# Patient Record
Sex: FEMALE | Race: WHITE | NOT HISPANIC OR LATINO | Employment: OTHER | URBAN - METROPOLITAN AREA
[De-identification: names, ages, dates, MRNs, and addresses within clinical notes are randomized per-mention and may not be internally consistent; named-entity substitution may affect disease eponyms.]

---

## 2017-01-11 ENCOUNTER — COMMUNICATION - HEALTHEAST (OUTPATIENT)
Dept: INTERNAL MEDICINE | Facility: CLINIC | Age: 82
End: 2017-01-11

## 2017-01-31 ENCOUNTER — COMMUNICATION - HEALTHEAST (OUTPATIENT)
Dept: INTERNAL MEDICINE | Facility: CLINIC | Age: 82
End: 2017-01-31

## 2017-01-31 ENCOUNTER — AMBULATORY - HEALTHEAST (OUTPATIENT)
Dept: ENDOCRINOLOGY | Facility: CLINIC | Age: 82
End: 2017-01-31

## 2017-01-31 ENCOUNTER — AMBULATORY - HEALTHEAST (OUTPATIENT)
Dept: INTERNAL MEDICINE | Facility: CLINIC | Age: 82
End: 2017-01-31

## 2017-01-31 DIAGNOSIS — M81.0 SENILE OSTEOPOROSIS: ICD-10-CM

## 2017-05-09 ENCOUNTER — COMMUNICATION - HEALTHEAST (OUTPATIENT)
Dept: FAMILY MEDICINE | Facility: CLINIC | Age: 82
End: 2017-05-09

## 2017-05-10 ENCOUNTER — COMMUNICATION - HEALTHEAST (OUTPATIENT)
Dept: SCHEDULING | Facility: CLINIC | Age: 82
End: 2017-05-10

## 2017-06-19 ENCOUNTER — OFFICE VISIT - HEALTHEAST (OUTPATIENT)
Dept: FAMILY MEDICINE | Facility: CLINIC | Age: 82
End: 2017-06-19

## 2017-06-19 DIAGNOSIS — H00.012 HORDEOLUM EXTERNUM OF RIGHT LOWER EYELID: ICD-10-CM

## 2017-06-23 ENCOUNTER — COMMUNICATION - HEALTHEAST (OUTPATIENT)
Dept: FAMILY MEDICINE | Facility: CLINIC | Age: 82
End: 2017-06-23

## 2017-07-25 ENCOUNTER — COMMUNICATION - HEALTHEAST (OUTPATIENT)
Dept: FAMILY MEDICINE | Facility: CLINIC | Age: 82
End: 2017-07-25

## 2017-07-25 DIAGNOSIS — E78.5 HYPERLIPIDEMIA: ICD-10-CM

## 2017-07-25 DIAGNOSIS — I10 ESSENTIAL HYPERTENSION: ICD-10-CM

## 2017-07-27 ENCOUNTER — AMBULATORY - HEALTHEAST (OUTPATIENT)
Dept: FAMILY MEDICINE | Facility: CLINIC | Age: 82
End: 2017-07-27

## 2017-07-27 ENCOUNTER — COMMUNICATION - HEALTHEAST (OUTPATIENT)
Dept: FAMILY MEDICINE | Facility: CLINIC | Age: 82
End: 2017-07-27

## 2017-08-03 ENCOUNTER — AMBULATORY - HEALTHEAST (OUTPATIENT)
Dept: ENDOCRINOLOGY | Facility: CLINIC | Age: 82
End: 2017-08-03

## 2017-08-03 ENCOUNTER — COMMUNICATION - HEALTHEAST (OUTPATIENT)
Dept: ENDOCRINOLOGY | Facility: CLINIC | Age: 82
End: 2017-08-03

## 2017-08-03 DIAGNOSIS — M81.0 SENILE OSTEOPOROSIS: ICD-10-CM

## 2017-08-03 DIAGNOSIS — E78.2 MIXED HYPERLIPIDEMIA: ICD-10-CM

## 2017-08-21 ENCOUNTER — AMBULATORY - HEALTHEAST (OUTPATIENT)
Dept: LAB | Facility: CLINIC | Age: 82
End: 2017-08-21

## 2017-08-21 DIAGNOSIS — M81.0 SENILE OSTEOPOROSIS: ICD-10-CM

## 2017-08-21 DIAGNOSIS — E78.2 MIXED HYPERLIPIDEMIA: ICD-10-CM

## 2017-08-22 ENCOUNTER — COMMUNICATION - HEALTHEAST (OUTPATIENT)
Dept: FAMILY MEDICINE | Facility: CLINIC | Age: 82
End: 2017-08-22

## 2017-08-28 ENCOUNTER — HOSPITAL ENCOUNTER (OUTPATIENT)
Dept: MAMMOGRAPHY | Facility: HOSPITAL | Age: 82
Discharge: HOME | End: 2017-08-28
Attending: FAMILY MEDICINE

## 2017-08-28 DIAGNOSIS — Z12.31 VISIT FOR SCREENING MAMMOGRAM: ICD-10-CM

## 2017-08-29 ENCOUNTER — OFFICE VISIT - HEALTHEAST (OUTPATIENT)
Dept: ENDOCRINOLOGY | Facility: CLINIC | Age: 82
End: 2017-08-29

## 2017-08-29 ENCOUNTER — AMBULATORY - HEALTHEAST (OUTPATIENT)
Dept: ENDOCRINOLOGY | Facility: CLINIC | Age: 82
End: 2017-08-29

## 2017-08-29 DIAGNOSIS — M81.0 SENILE OSTEOPOROSIS: ICD-10-CM

## 2017-08-29 DIAGNOSIS — L25.9 CONTACT DERMATITIS: ICD-10-CM

## 2017-08-29 DIAGNOSIS — L30.9 ECZEMA: ICD-10-CM

## 2017-08-29 ASSESSMENT — MIFFLIN-ST. JEOR: SCORE: 971.59

## 2017-10-05 ENCOUNTER — COMMUNICATION - HEALTHEAST (OUTPATIENT)
Dept: FAMILY MEDICINE | Facility: CLINIC | Age: 82
End: 2017-10-05

## 2017-10-05 DIAGNOSIS — I10 ESSENTIAL HYPERTENSION: ICD-10-CM

## 2017-10-05 DIAGNOSIS — E78.5 HYPERLIPIDEMIA: ICD-10-CM

## 2017-10-27 ENCOUNTER — OFFICE VISIT - HEALTHEAST (OUTPATIENT)
Dept: FAMILY MEDICINE | Facility: CLINIC | Age: 82
End: 2017-10-27

## 2017-10-27 DIAGNOSIS — M15.9 GENERALIZED OSTEOARTHROSIS, INVOLVING MULTIPLE SITES: ICD-10-CM

## 2017-10-27 DIAGNOSIS — L30.9 ECZEMA: ICD-10-CM

## 2017-10-27 DIAGNOSIS — K21.9 GASTROESOPHAGEAL REFLUX DISEASE WITHOUT ESOPHAGITIS: ICD-10-CM

## 2017-10-27 DIAGNOSIS — E78.2 MIXED HYPERLIPIDEMIA: ICD-10-CM

## 2017-10-27 DIAGNOSIS — I10 HYPERTENSION, UNSPECIFIED TYPE: ICD-10-CM

## 2017-10-27 LAB
CHOLEST SERPL-MCNC: 178 MG/DL
FASTING STATUS PATIENT QL REPORTED: YES
HDLC SERPL-MCNC: 94 MG/DL
LDLC SERPL CALC-MCNC: 75 MG/DL
TRIGL SERPL-MCNC: 47 MG/DL

## 2017-10-30 ENCOUNTER — COMMUNICATION - HEALTHEAST (OUTPATIENT)
Dept: FAMILY MEDICINE | Facility: CLINIC | Age: 82
End: 2017-10-30

## 2017-10-31 ENCOUNTER — COMMUNICATION - HEALTHEAST (OUTPATIENT)
Dept: FAMILY MEDICINE | Facility: CLINIC | Age: 82
End: 2017-10-31

## 2017-11-11 ENCOUNTER — RECORDS - HEALTHEAST (OUTPATIENT)
Dept: ADMINISTRATIVE | Facility: OTHER | Age: 82
End: 2017-11-11

## 2017-12-11 ENCOUNTER — OFFICE VISIT - HEALTHEAST (OUTPATIENT)
Dept: CARDIOLOGY | Facility: CLINIC | Age: 82
End: 2017-12-11

## 2017-12-11 DIAGNOSIS — I47.10 SVT (SUPRAVENTRICULAR TACHYCARDIA) (H): ICD-10-CM

## 2017-12-11 ASSESSMENT — MIFFLIN-ST. JEOR: SCORE: 948.91

## 2017-12-12 ENCOUNTER — COMMUNICATION - HEALTHEAST (OUTPATIENT)
Dept: FAMILY MEDICINE | Facility: CLINIC | Age: 82
End: 2017-12-12

## 2017-12-12 DIAGNOSIS — E78.5 HYPERLIPIDEMIA: ICD-10-CM

## 2017-12-12 DIAGNOSIS — I10 ESSENTIAL HYPERTENSION: ICD-10-CM

## 2017-12-12 DIAGNOSIS — Z96.659 H/O TOTAL KNEE REPLACEMENT, UNSPECIFIED LATERALITY: ICD-10-CM

## 2017-12-13 ENCOUNTER — COMMUNICATION - HEALTHEAST (OUTPATIENT)
Dept: FAMILY MEDICINE | Facility: CLINIC | Age: 82
End: 2017-12-13

## 2018-01-19 ENCOUNTER — COMMUNICATION - HEALTHEAST (OUTPATIENT)
Dept: FAMILY MEDICINE | Facility: CLINIC | Age: 83
End: 2018-01-19

## 2018-03-02 ENCOUNTER — AMBULATORY - HEALTHEAST (OUTPATIENT)
Dept: ENDOCRINOLOGY | Facility: CLINIC | Age: 83
End: 2018-03-02

## 2018-04-02 ENCOUNTER — OFFICE VISIT - HEALTHEAST (OUTPATIENT)
Dept: FAMILY MEDICINE | Facility: CLINIC | Age: 83
End: 2018-04-02

## 2018-04-02 DIAGNOSIS — Z96.659 H/O TOTAL KNEE REPLACEMENT, UNSPECIFIED LATERALITY: ICD-10-CM

## 2018-04-02 DIAGNOSIS — T81.30XA WOUND DISRUPTION, INITIAL ENCOUNTER: ICD-10-CM

## 2018-04-02 DIAGNOSIS — Q80.9 XERODERMA: ICD-10-CM

## 2018-04-09 ENCOUNTER — OFFICE VISIT - HEALTHEAST (OUTPATIENT)
Dept: FAMILY MEDICINE | Facility: CLINIC | Age: 83
End: 2018-04-09

## 2018-04-09 DIAGNOSIS — L82.1 SEBORRHEIC KERATOSES: ICD-10-CM

## 2018-04-09 DIAGNOSIS — S80.812D: ICD-10-CM

## 2018-05-08 ENCOUNTER — COMMUNICATION - HEALTHEAST (OUTPATIENT)
Dept: FAMILY MEDICINE | Facility: CLINIC | Age: 83
End: 2018-05-08

## 2018-06-01 ENCOUNTER — AMBULATORY - HEALTHEAST (OUTPATIENT)
Dept: CARDIOLOGY | Facility: CLINIC | Age: 83
End: 2018-06-01

## 2018-06-01 ENCOUNTER — SURGERY - HEALTHEAST (OUTPATIENT)
Dept: CARDIOLOGY | Facility: CLINIC | Age: 83
End: 2018-06-01

## 2018-06-01 ENCOUNTER — COMMUNICATION - HEALTHEAST (OUTPATIENT)
Dept: CARDIOLOGY | Facility: CLINIC | Age: 83
End: 2018-06-01

## 2018-06-01 DIAGNOSIS — I47.10 SVT (SUPRAVENTRICULAR TACHYCARDIA) (H): ICD-10-CM

## 2018-06-06 ENCOUNTER — OFFICE VISIT - HEALTHEAST (OUTPATIENT)
Dept: FAMILY MEDICINE | Facility: CLINIC | Age: 83
End: 2018-06-06

## 2018-06-06 DIAGNOSIS — Z01.818 PRE-OP EXAM: ICD-10-CM

## 2018-06-06 DIAGNOSIS — R79.89 OTHER SPECIFIED ABNORMAL FINDINGS OF BLOOD CHEMISTRY: ICD-10-CM

## 2018-06-06 DIAGNOSIS — E03.9 HYPOTHYROIDISM: ICD-10-CM

## 2018-06-06 LAB
ERYTHROCYTE [DISTWIDTH] IN BLOOD BY AUTOMATED COUNT: 11.8 % (ref 11–14.5)
HCT VFR BLD AUTO: 39.8 % (ref 35–47)
HGB BLD-MCNC: 13.6 G/DL (ref 12–16)
MCH RBC QN AUTO: 32 PG (ref 27–34)
MCHC RBC AUTO-ENTMCNC: 34.2 G/DL (ref 32–36)
MCV RBC AUTO: 94 FL (ref 80–100)
PLATELET # BLD AUTO: 260 THOU/UL (ref 140–440)
PMV BLD AUTO: 7.7 FL (ref 7–10)
RBC # BLD AUTO: 4.25 MILL/UL (ref 3.8–5.4)
WBC: 8.5 THOU/UL (ref 4–11)

## 2018-06-06 ASSESSMENT — MIFFLIN-ST. JEOR: SCORE: 938.19

## 2018-06-08 ENCOUNTER — COMMUNICATION - HEALTHEAST (OUTPATIENT)
Dept: FAMILY MEDICINE | Facility: CLINIC | Age: 83
End: 2018-06-08

## 2018-06-26 ENCOUNTER — SURGERY - HEALTHEAST (OUTPATIENT)
Dept: CARDIOLOGY | Facility: CLINIC | Age: 83
End: 2018-06-26

## 2018-06-26 ENCOUNTER — COMMUNICATION - HEALTHEAST (OUTPATIENT)
Dept: CARDIOLOGY | Facility: CLINIC | Age: 83
End: 2018-06-26

## 2018-06-26 DIAGNOSIS — I47.10 SVT (SUPRAVENTRICULAR TACHYCARDIA) (H): ICD-10-CM

## 2018-06-26 ASSESSMENT — MIFFLIN-ST. JEOR: SCORE: 932.31

## 2018-06-27 ENCOUNTER — COMMUNICATION - HEALTHEAST (OUTPATIENT)
Dept: CARDIOLOGY | Facility: CLINIC | Age: 83
End: 2018-06-27

## 2018-06-27 ENCOUNTER — AMBULATORY - HEALTHEAST (OUTPATIENT)
Dept: SCHEDULING | Facility: CLINIC | Age: 83
End: 2018-06-27

## 2018-06-27 DIAGNOSIS — M81.0 SENILE OSTEOPOROSIS: ICD-10-CM

## 2018-07-03 ENCOUNTER — COMMUNICATION - HEALTHEAST (OUTPATIENT)
Dept: CARDIOLOGY | Facility: CLINIC | Age: 83
End: 2018-07-03

## 2018-07-06 ENCOUNTER — COMMUNICATION - HEALTHEAST (OUTPATIENT)
Dept: FAMILY MEDICINE | Facility: CLINIC | Age: 83
End: 2018-07-06

## 2018-07-09 ENCOUNTER — COMMUNICATION - HEALTHEAST (OUTPATIENT)
Dept: FAMILY MEDICINE | Facility: CLINIC | Age: 83
End: 2018-07-09

## 2018-07-09 ENCOUNTER — AMBULATORY - HEALTHEAST (OUTPATIENT)
Dept: LAB | Facility: CLINIC | Age: 83
End: 2018-07-09

## 2018-07-09 ENCOUNTER — COMMUNICATION - HEALTHEAST (OUTPATIENT)
Dept: ADMINISTRATIVE | Facility: CLINIC | Age: 83
End: 2018-07-09

## 2018-07-09 DIAGNOSIS — E03.9 HYPOTHYROIDISM: ICD-10-CM

## 2018-07-09 DIAGNOSIS — M81.0 SENILE OSTEOPOROSIS: ICD-10-CM

## 2018-07-10 ENCOUNTER — AMBULATORY - HEALTHEAST (OUTPATIENT)
Dept: FAMILY MEDICINE | Facility: CLINIC | Age: 83
End: 2018-07-10

## 2018-07-10 DIAGNOSIS — E03.9 HYPOTHYROIDISM: ICD-10-CM

## 2018-07-10 LAB — TSH SERPL DL<=0.005 MIU/L-ACNC: 1.79 UIU/ML (ref 0.3–5)

## 2018-07-11 ENCOUNTER — COMMUNICATION - HEALTHEAST (OUTPATIENT)
Dept: FAMILY MEDICINE | Facility: CLINIC | Age: 83
End: 2018-07-11

## 2018-07-12 ENCOUNTER — OFFICE VISIT - HEALTHEAST (OUTPATIENT)
Dept: CARDIOLOGY | Facility: CLINIC | Age: 83
End: 2018-07-12

## 2018-07-12 DIAGNOSIS — R07.89 ATYPICAL CHEST PAIN: ICD-10-CM

## 2018-07-12 DIAGNOSIS — I47.10 SVT (SUPRAVENTRICULAR TACHYCARDIA) (H): ICD-10-CM

## 2018-07-12 ASSESSMENT — MIFFLIN-ST. JEOR: SCORE: 946.64

## 2018-08-01 ENCOUNTER — HOSPITAL ENCOUNTER (OUTPATIENT)
Dept: CARDIOLOGY | Facility: HOSPITAL | Age: 83
Discharge: HOME | End: 2018-08-01
Attending: INTERNAL MEDICINE

## 2018-08-01 ENCOUNTER — HOSPITAL ENCOUNTER (OUTPATIENT)
Dept: NUCLEAR MEDICINE | Facility: HOSPITAL | Age: 83
Discharge: HOME | End: 2018-08-01
Attending: INTERNAL MEDICINE

## 2018-08-01 DIAGNOSIS — R07.89 ATYPICAL CHEST PAIN: ICD-10-CM

## 2018-08-01 LAB
CV STRESS CURRENT BP HE: NORMAL
CV STRESS CURRENT HR HE: 56
CV STRESS CURRENT HR HE: 57
CV STRESS CURRENT HR HE: 62
CV STRESS CURRENT HR HE: 64
CV STRESS CURRENT HR HE: 66
CV STRESS CURRENT HR HE: 66
CV STRESS CURRENT HR HE: 68
CV STRESS CURRENT HR HE: 68
CV STRESS CURRENT HR HE: 76
CV STRESS CURRENT HR HE: 88
CV STRESS CURRENT HR HE: 89
CV STRESS CURRENT HR HE: 89
CV STRESS CURRENT HR HE: 91
CV STRESS CURRENT HR HE: 92
CV STRESS CURRENT HR HE: 99
CV STRESS CURRENT HR HE: 99
CV STRESS DEVIATION TIME HE: NORMAL
CV STRESS ECHO PERCENT HR HE: NORMAL
CV STRESS EXERCISE STAGE HE: NORMAL
CV STRESS FINAL RESTING BP HE: NORMAL
CV STRESS FINAL RESTING HR HE: 68
CV STRESS MAX HR HE: 99
CV STRESS MAX TREADMILL GRADE HE: 0
CV STRESS MAX TREADMILL SPEED HE: 0
CV STRESS PEAK DIA BP HE: NORMAL
CV STRESS PEAK SYS BP HE: NORMAL
CV STRESS PHASE HE: NORMAL
CV STRESS PROTOCOL HE: NORMAL
CV STRESS RESTING PT POSITION HE: NORMAL
CV STRESS ST DEVIATION AMOUNT HE: NORMAL
CV STRESS ST DEVIATION ELEVATION HE: NORMAL
CV STRESS ST EVELATION AMOUNT HE: NORMAL
CV STRESS TEST TYPE HE: NORMAL
CV STRESS TOTAL STAGE TIME MIN 1 HE: NORMAL
NUC STRESS EJECTION FRACTION: 81 %
STRESS ECHO BASELINE BP: NORMAL
STRESS ECHO BASELINE HR: 61
STRESS ECHO CALCULATED PERCENT HR: 72 %
STRESS ECHO LAST STRESS BP: NORMAL
STRESS ECHO LAST STRESS HR: 89

## 2018-08-01 ASSESSMENT — MIFFLIN-ST. JEOR: SCORE: 946.64

## 2018-08-02 ENCOUNTER — COMMUNICATION - HEALTHEAST (OUTPATIENT)
Dept: CARDIOLOGY | Facility: CLINIC | Age: 83
End: 2018-08-02

## 2018-08-09 ENCOUNTER — COMMUNICATION - HEALTHEAST (OUTPATIENT)
Dept: FAMILY MEDICINE | Facility: CLINIC | Age: 83
End: 2018-08-09

## 2018-08-09 DIAGNOSIS — R79.89 OTHER SPECIFIED ABNORMAL FINDINGS OF BLOOD CHEMISTRY: ICD-10-CM

## 2018-08-14 ENCOUNTER — OFFICE VISIT - HEALTHEAST (OUTPATIENT)
Dept: FAMILY MEDICINE | Facility: CLINIC | Age: 83
End: 2018-08-14

## 2018-08-14 DIAGNOSIS — L82.0 INFLAMED SEBORRHEIC KERATOSIS: ICD-10-CM

## 2018-08-14 DIAGNOSIS — M81.0 SENILE OSTEOPOROSIS: ICD-10-CM

## 2018-08-14 LAB — CALCIUM SERPL-MCNC: 9.5 MG/DL (ref 8.5–10.5)

## 2018-08-14 ASSESSMENT — MIFFLIN-ST. JEOR: SCORE: 933.15

## 2018-08-15 LAB — 25(OH)D3 SERPL-MCNC: 55.2 NG/ML (ref 30–80)

## 2018-08-28 ENCOUNTER — COMMUNICATION - HEALTHEAST (OUTPATIENT)
Dept: FAMILY MEDICINE | Facility: CLINIC | Age: 83
End: 2018-08-28

## 2018-08-30 ENCOUNTER — OFFICE VISIT - HEALTHEAST (OUTPATIENT)
Dept: CARDIOLOGY | Facility: CLINIC | Age: 83
End: 2018-08-30

## 2018-08-30 DIAGNOSIS — I47.10 SVT (SUPRAVENTRICULAR TACHYCARDIA) (H): ICD-10-CM

## 2018-08-30 ASSESSMENT — MIFFLIN-ST. JEOR: SCORE: 932.47

## 2018-09-05 ENCOUNTER — COMMUNICATION - HEALTHEAST (OUTPATIENT)
Dept: ENDOCRINOLOGY | Facility: CLINIC | Age: 83
End: 2018-09-05

## 2018-09-11 ENCOUNTER — HOSPITAL ENCOUNTER (OUTPATIENT)
Dept: MAMMOGRAPHY | Facility: CLINIC | Age: 83
Discharge: HOME | End: 2018-09-11
Attending: FAMILY MEDICINE

## 2018-09-11 ENCOUNTER — OFFICE VISIT - HEALTHEAST (OUTPATIENT)
Dept: ENDOCRINOLOGY | Facility: CLINIC | Age: 83
End: 2018-09-11

## 2018-09-11 DIAGNOSIS — M81.0 SENILE OSTEOPOROSIS: ICD-10-CM

## 2018-09-11 DIAGNOSIS — Z12.31 VISIT FOR SCREENING MAMMOGRAM: ICD-10-CM

## 2018-09-11 DIAGNOSIS — L08.9 INFECTION OF TOE: ICD-10-CM

## 2018-09-11 ASSESSMENT — MIFFLIN-ST. JEOR: SCORE: 941.54

## 2018-09-12 ENCOUNTER — COMMUNICATION - HEALTHEAST (OUTPATIENT)
Dept: FAMILY MEDICINE | Facility: CLINIC | Age: 83
End: 2018-09-12

## 2018-09-13 ENCOUNTER — HOSPITAL ENCOUNTER (OUTPATIENT)
Dept: MAMMOGRAPHY | Facility: CLINIC | Age: 83
Discharge: HOME | End: 2018-09-13
Attending: FAMILY MEDICINE

## 2018-09-13 DIAGNOSIS — N64.89 BREAST ASYMMETRY: ICD-10-CM

## 2018-11-13 ENCOUNTER — RECORDS - HEALTHEAST (OUTPATIENT)
Dept: ADMINISTRATIVE | Facility: OTHER | Age: 83
End: 2018-11-13

## 2019-01-15 ENCOUNTER — COMMUNICATION - HEALTHEAST (OUTPATIENT)
Dept: FAMILY MEDICINE | Facility: CLINIC | Age: 84
End: 2019-01-15

## 2019-01-15 DIAGNOSIS — E78.5 HYPERLIPIDEMIA: ICD-10-CM

## 2019-01-16 ENCOUNTER — COMMUNICATION - HEALTHEAST (OUTPATIENT)
Dept: FAMILY MEDICINE | Facility: CLINIC | Age: 84
End: 2019-01-16

## 2019-01-16 DIAGNOSIS — R79.89 OTHER SPECIFIED ABNORMAL FINDINGS OF BLOOD CHEMISTRY: ICD-10-CM

## 2019-01-17 ENCOUNTER — COMMUNICATION - HEALTHEAST (OUTPATIENT)
Dept: FAMILY MEDICINE | Facility: CLINIC | Age: 84
End: 2019-01-17

## 2019-01-18 ENCOUNTER — COMMUNICATION - HEALTHEAST (OUTPATIENT)
Dept: FAMILY MEDICINE | Facility: CLINIC | Age: 84
End: 2019-01-18

## 2019-02-25 ENCOUNTER — AMBULATORY - HEALTHEAST (OUTPATIENT)
Dept: FAMILY MEDICINE | Facility: CLINIC | Age: 84
End: 2019-02-25

## 2019-02-25 ENCOUNTER — OFFICE VISIT - HEALTHEAST (OUTPATIENT)
Dept: FAMILY MEDICINE | Facility: CLINIC | Age: 84
End: 2019-02-25

## 2019-02-25 DIAGNOSIS — E03.9 HYPOTHYROIDISM, UNSPECIFIED TYPE: ICD-10-CM

## 2019-02-25 DIAGNOSIS — L57.0 ACTINIC KERATOSIS: ICD-10-CM

## 2019-02-25 DIAGNOSIS — Z00.00 HEALTH CARE MAINTENANCE: ICD-10-CM

## 2019-02-26 ENCOUNTER — COMMUNICATION - HEALTHEAST (OUTPATIENT)
Dept: FAMILY MEDICINE | Facility: CLINIC | Age: 84
End: 2019-02-26

## 2019-02-26 DIAGNOSIS — L30.9 ECZEMA: ICD-10-CM

## 2019-02-26 DIAGNOSIS — L25.9 CONTACT DERMATITIS: ICD-10-CM

## 2019-02-26 DIAGNOSIS — L08.9 INFECTION OF TOE: ICD-10-CM

## 2019-03-18 ENCOUNTER — AMBULATORY - HEALTHEAST (OUTPATIENT)
Dept: ENDOCRINOLOGY | Facility: CLINIC | Age: 84
End: 2019-03-18

## 2019-03-28 ENCOUNTER — COMMUNICATION - HEALTHEAST (OUTPATIENT)
Dept: FAMILY MEDICINE | Facility: CLINIC | Age: 84
End: 2019-03-28

## 2019-03-28 DIAGNOSIS — R79.89 OTHER SPECIFIED ABNORMAL FINDINGS OF BLOOD CHEMISTRY: ICD-10-CM

## 2019-05-09 ENCOUNTER — COMMUNICATION - HEALTHEAST (OUTPATIENT)
Dept: ADMINISTRATIVE | Facility: CLINIC | Age: 84
End: 2019-05-09

## 2019-06-07 ENCOUNTER — COMMUNICATION - HEALTHEAST (OUTPATIENT)
Dept: CARDIOLOGY | Facility: CLINIC | Age: 84
End: 2019-06-07

## 2019-06-11 ENCOUNTER — AMBULATORY - HEALTHEAST (OUTPATIENT)
Dept: ENDOCRINOLOGY | Facility: CLINIC | Age: 84
End: 2019-06-11

## 2019-06-11 DIAGNOSIS — M81.0 SENILE OSTEOPOROSIS: ICD-10-CM

## 2019-08-06 ENCOUNTER — OFFICE VISIT - HEALTHEAST (OUTPATIENT)
Dept: CARDIOLOGY | Facility: CLINIC | Age: 84
End: 2019-08-06

## 2019-08-06 DIAGNOSIS — I47.10 SVT (SUPRAVENTRICULAR TACHYCARDIA) (H): ICD-10-CM

## 2019-08-06 ASSESSMENT — MIFFLIN-ST. JEOR: SCORE: 941.54

## 2019-09-11 ENCOUNTER — AMBULATORY - HEALTHEAST (OUTPATIENT)
Dept: LAB | Facility: CLINIC | Age: 84
End: 2019-09-11

## 2019-09-11 DIAGNOSIS — E03.9 HYPOTHYROIDISM, UNSPECIFIED TYPE: ICD-10-CM

## 2019-09-11 DIAGNOSIS — M81.0 SENILE OSTEOPOROSIS: ICD-10-CM

## 2019-09-11 DIAGNOSIS — Z00.00 HEALTH CARE MAINTENANCE: ICD-10-CM

## 2019-09-11 LAB
ALBUMIN SERPL-MCNC: 3.8 G/DL (ref 3.5–5)
ALP SERPL-CCNC: 78 U/L (ref 45–120)
ALT SERPL W P-5'-P-CCNC: 13 U/L (ref 0–45)
ANION GAP SERPL CALCULATED.3IONS-SCNC: 11 MMOL/L (ref 5–18)
AST SERPL W P-5'-P-CCNC: 21 U/L (ref 0–40)
BILIRUB SERPL-MCNC: 0.6 MG/DL (ref 0–1)
BUN SERPL-MCNC: 15 MG/DL (ref 8–28)
CALCIUM SERPL-MCNC: 10.4 MG/DL (ref 8.5–10.5)
CHLORIDE BLD-SCNC: 107 MMOL/L (ref 98–107)
CHOLEST SERPL-MCNC: 192 MG/DL
CO2 SERPL-SCNC: 25 MMOL/L (ref 22–31)
CREAT SERPL-MCNC: 0.93 MG/DL (ref 0.6–1.1)
FASTING STATUS PATIENT QL REPORTED: YES
GFR SERPL CREATININE-BSD FRML MDRD: 57 ML/MIN/1.73M2
GLUCOSE BLD-MCNC: 85 MG/DL (ref 70–125)
HDLC SERPL-MCNC: 102 MG/DL
LDLC SERPL CALC-MCNC: 80 MG/DL
POTASSIUM BLD-SCNC: 4.5 MMOL/L (ref 3.5–5)
PROT SERPL-MCNC: 6.6 G/DL (ref 6–8)
SODIUM SERPL-SCNC: 143 MMOL/L (ref 136–145)
TRIGL SERPL-MCNC: 52 MG/DL
TSH SERPL DL<=0.005 MIU/L-ACNC: 1.42 UIU/ML (ref 0.3–5)

## 2019-09-12 ENCOUNTER — COMMUNICATION - HEALTHEAST (OUTPATIENT)
Dept: FAMILY MEDICINE | Facility: CLINIC | Age: 84
End: 2019-09-12

## 2019-09-12 LAB — 25(OH)D3 SERPL-MCNC: 62.1 NG/ML (ref 30–80)

## 2019-09-18 ENCOUNTER — OFFICE VISIT - HEALTHEAST (OUTPATIENT)
Dept: ENDOCRINOLOGY | Facility: CLINIC | Age: 84
End: 2019-09-18

## 2019-09-18 ENCOUNTER — AMBULATORY - HEALTHEAST (OUTPATIENT)
Dept: ENDOCRINOLOGY | Facility: CLINIC | Age: 84
End: 2019-09-18

## 2019-09-18 DIAGNOSIS — M81.0 SENILE OSTEOPOROSIS: ICD-10-CM

## 2019-10-07 ENCOUNTER — HOSPITAL ENCOUNTER (OUTPATIENT)
Dept: MAMMOGRAPHY | Facility: CLINIC | Age: 84
Discharge: HOME | End: 2019-10-07
Attending: FAMILY MEDICINE

## 2019-10-07 DIAGNOSIS — Z12.31 VISIT FOR SCREENING MAMMOGRAM: ICD-10-CM

## 2019-10-15 ENCOUNTER — OFFICE VISIT - HEALTHEAST (OUTPATIENT)
Dept: FAMILY MEDICINE | Facility: CLINIC | Age: 84
End: 2019-10-15

## 2019-10-15 DIAGNOSIS — L08.9 INFECTION OF TOE: ICD-10-CM

## 2019-10-15 DIAGNOSIS — Z71.84 TRAVEL ADVICE ENCOUNTER: ICD-10-CM

## 2019-10-15 DIAGNOSIS — I47.10 SVT (SUPRAVENTRICULAR TACHYCARDIA) (H): ICD-10-CM

## 2019-10-15 DIAGNOSIS — E78.5 HYPERLIPIDEMIA: ICD-10-CM

## 2019-10-15 DIAGNOSIS — R22.1 MASS OF LEFT SIDE OF NECK: ICD-10-CM

## 2019-10-15 DIAGNOSIS — R79.89 OTHER SPECIFIED ABNORMAL FINDINGS OF BLOOD CHEMISTRY: ICD-10-CM

## 2019-10-15 ASSESSMENT — MIFFLIN-ST. JEOR: SCORE: 952.42

## 2019-10-16 ENCOUNTER — COMMUNICATION - HEALTHEAST (OUTPATIENT)
Dept: FAMILY MEDICINE | Facility: CLINIC | Age: 84
End: 2019-10-16

## 2019-10-16 ENCOUNTER — HOSPITAL ENCOUNTER (OUTPATIENT)
Dept: ULTRASOUND IMAGING | Facility: HOSPITAL | Age: 84
Discharge: HOME | End: 2019-10-16
Attending: FAMILY MEDICINE

## 2019-10-16 DIAGNOSIS — R22.1 MASS OF LEFT SIDE OF NECK: ICD-10-CM

## 2020-02-14 ENCOUNTER — AMBULATORY - HEALTHEAST (OUTPATIENT)
Dept: ENDOCRINOLOGY | Facility: CLINIC | Age: 85
End: 2020-02-14

## 2020-02-14 DIAGNOSIS — Z92.29 PERSONAL HISTORY OF OTHER DRUG THERAPY: ICD-10-CM

## 2020-02-14 DIAGNOSIS — M81.0 SENILE OSTEOPOROSIS: ICD-10-CM

## 2020-03-09 ENCOUNTER — COMMUNICATION - HEALTHEAST (OUTPATIENT)
Dept: ADMINISTRATIVE | Facility: CLINIC | Age: 85
End: 2020-03-09

## 2020-03-23 ENCOUNTER — RECORDS - HEALTHEAST (OUTPATIENT)
Dept: ADMINISTRATIVE | Facility: OTHER | Age: 85
End: 2020-03-23

## 2020-06-09 ENCOUNTER — AMBULATORY - HEALTHEAST (OUTPATIENT)
Dept: SCHEDULING | Facility: CLINIC | Age: 85
End: 2020-06-09

## 2020-06-09 DIAGNOSIS — M81.0 SENILE OSTEOPOROSIS: ICD-10-CM

## 2020-06-23 ENCOUNTER — COMMUNICATION - HEALTHEAST (OUTPATIENT)
Dept: SCHEDULING | Facility: CLINIC | Age: 85
End: 2020-06-23

## 2020-06-23 ENCOUNTER — AMBULATORY - HEALTHEAST (OUTPATIENT)
Dept: ENDOCRINOLOGY | Facility: CLINIC | Age: 85
End: 2020-06-23

## 2020-06-29 ENCOUNTER — OFFICE VISIT - HEALTHEAST (OUTPATIENT)
Dept: FAMILY MEDICINE | Facility: CLINIC | Age: 85
End: 2020-06-29

## 2020-06-29 ENCOUNTER — COMMUNICATION - HEALTHEAST (OUTPATIENT)
Dept: FAMILY MEDICINE | Facility: CLINIC | Age: 85
End: 2020-06-29

## 2020-06-29 DIAGNOSIS — M81.0 SENILE OSTEOPOROSIS: ICD-10-CM

## 2020-06-29 DIAGNOSIS — R55 SYNCOPE: ICD-10-CM

## 2020-06-29 DIAGNOSIS — E03.9 HYPOTHYROIDISM, UNSPECIFIED TYPE: ICD-10-CM

## 2020-06-29 DIAGNOSIS — I47.10 SVT (SUPRAVENTRICULAR TACHYCARDIA) (H): ICD-10-CM

## 2020-06-29 DIAGNOSIS — Z79.899 MEDICATION MANAGEMENT: ICD-10-CM

## 2020-06-29 DIAGNOSIS — G56.21 CUBITAL TUNNEL SYNDROME ON RIGHT: ICD-10-CM

## 2020-06-29 DIAGNOSIS — R79.89 OTHER SPECIFIED ABNORMAL FINDINGS OF BLOOD CHEMISTRY: ICD-10-CM

## 2020-06-29 DIAGNOSIS — E78.5 HYPERLIPIDEMIA: ICD-10-CM

## 2020-06-29 LAB
ALBUMIN SERPL-MCNC: 3.9 G/DL (ref 3.5–5)
ALP SERPL-CCNC: 82 U/L (ref 45–120)
ALT SERPL W P-5'-P-CCNC: 13 U/L (ref 0–45)
AMPHETAMINES UR QL SCN: NORMAL
ANION GAP SERPL CALCULATED.3IONS-SCNC: 13 MMOL/L (ref 5–18)
AST SERPL W P-5'-P-CCNC: 21 U/L (ref 0–40)
ATRIAL RATE - MUSE: 57 BPM
BARBITURATES UR QL: NORMAL
BASOPHILS # BLD AUTO: 0.1 THOU/UL (ref 0–0.2)
BASOPHILS NFR BLD AUTO: 1 % (ref 0–2)
BENZODIAZ UR QL: NORMAL
BILIRUB SERPL-MCNC: 0.5 MG/DL (ref 0–1)
BUN SERPL-MCNC: 16 MG/DL (ref 8–28)
CALCIUM SERPL-MCNC: 8.9 MG/DL (ref 8.5–10.5)
CANNABINOIDS UR QL SCN: NORMAL
CHLORIDE BLD-SCNC: 107 MMOL/L (ref 98–107)
CO2 SERPL-SCNC: 20 MMOL/L (ref 22–31)
COCAINE UR QL: NORMAL
CREAT SERPL-MCNC: 0.85 MG/DL (ref 0.6–1.1)
CREAT UR-MCNC: 71.4 MG/DL
DIASTOLIC BLOOD PRESSURE - MUSE: NORMAL
EOSINOPHIL # BLD AUTO: 0.3 THOU/UL (ref 0–0.4)
EOSINOPHIL NFR BLD AUTO: 4 % (ref 0–6)
ERYTHROCYTE [DISTWIDTH] IN BLOOD BY AUTOMATED COUNT: 11.9 % (ref 11–14.5)
GFR SERPL CREATININE-BSD FRML MDRD: >60 ML/MIN/1.73M2
GLUCOSE BLD-MCNC: 82 MG/DL (ref 70–125)
HCT VFR BLD AUTO: 39 % (ref 35–47)
HGB BLD-MCNC: 13.6 G/DL (ref 12–16)
INTERPRETATION ECG - MUSE: NORMAL
LYMPHOCYTES # BLD AUTO: 2 THOU/UL (ref 0.8–4.4)
LYMPHOCYTES NFR BLD AUTO: 31 % (ref 20–40)
MCH RBC QN AUTO: 32.7 PG (ref 27–34)
MCHC RBC AUTO-ENTMCNC: 34.8 G/DL (ref 32–36)
MCV RBC AUTO: 94 FL (ref 80–100)
METHADONE UR QL SCN: NORMAL
MONOCYTES # BLD AUTO: 0.4 THOU/UL (ref 0–0.9)
MONOCYTES NFR BLD AUTO: 6 % (ref 2–10)
NEUTROPHILS # BLD AUTO: 3.9 THOU/UL (ref 2–7.7)
NEUTROPHILS NFR BLD AUTO: 59 % (ref 50–70)
OPIATES UR QL SCN: NORMAL
OXYCODONE UR QL: NORMAL
P AXIS - MUSE: 38 DEGREES
PCP UR QL SCN: NORMAL
PLATELET # BLD AUTO: 216 THOU/UL (ref 140–440)
PMV BLD AUTO: 7.9 FL (ref 7–10)
POTASSIUM BLD-SCNC: 4.5 MMOL/L (ref 3.5–5)
PR INTERVAL - MUSE: 132 MS
PROT SERPL-MCNC: 6.5 G/DL (ref 6–8)
QRS DURATION - MUSE: 74 MS
QT - MUSE: 446 MS
QTC - MUSE: 434 MS
R AXIS - MUSE: -21 DEGREES
RBC # BLD AUTO: 4.14 MILL/UL (ref 3.8–5.4)
SODIUM SERPL-SCNC: 140 MMOL/L (ref 136–145)
SYSTOLIC BLOOD PRESSURE - MUSE: NORMAL
T AXIS - MUSE: -2 DEGREES
VENTRICULAR RATE- MUSE: 57 BPM
WBC: 6.6 THOU/UL (ref 4–11)

## 2020-06-29 RX ORDER — SIMVASTATIN 20 MG
TABLET ORAL
Qty: 90 TABLET | Refills: 3 | Status: SHIPPED | OUTPATIENT
Start: 2020-06-29 | End: 2021-08-31

## 2020-06-29 RX ORDER — METOPROLOL SUCCINATE 50 MG/1
50 TABLET, EXTENDED RELEASE ORAL AT BEDTIME
Qty: 90 TABLET | Refills: 3 | Status: SHIPPED | OUTPATIENT
Start: 2020-06-29 | End: 2021-08-31

## 2020-06-30 ENCOUNTER — COMMUNICATION - HEALTHEAST (OUTPATIENT)
Dept: ADMINISTRATIVE | Facility: CLINIC | Age: 85
End: 2020-06-30

## 2020-06-30 ENCOUNTER — COMMUNICATION - HEALTHEAST (OUTPATIENT)
Dept: FAMILY MEDICINE | Facility: CLINIC | Age: 85
End: 2020-06-30

## 2020-06-30 DIAGNOSIS — M81.0 SENILE OSTEOPOROSIS: ICD-10-CM

## 2020-07-01 ENCOUNTER — COMMUNICATION - HEALTHEAST (OUTPATIENT)
Dept: FAMILY MEDICINE | Facility: CLINIC | Age: 85
End: 2020-07-01

## 2020-07-01 DIAGNOSIS — E03.9 HYPOTHYROIDISM, UNSPECIFIED TYPE: ICD-10-CM

## 2020-07-01 DIAGNOSIS — M89.9 DISORDER OF BONE, UNSPECIFIED: ICD-10-CM

## 2020-07-01 LAB
25(OH)D3 SERPL-MCNC: 59.1 NG/ML (ref 30–80)
TSH SERPL DL<=0.005 MIU/L-ACNC: 1.24 UIU/ML (ref 0.3–5)

## 2020-07-02 ENCOUNTER — COMMUNICATION - HEALTHEAST (OUTPATIENT)
Dept: FAMILY MEDICINE | Facility: CLINIC | Age: 85
End: 2020-07-02

## 2020-09-15 ENCOUNTER — COMMUNICATION - HEALTHEAST (OUTPATIENT)
Dept: CARDIOLOGY | Facility: CLINIC | Age: 85
End: 2020-09-15

## 2020-09-15 ENCOUNTER — AMBULATORY - HEALTHEAST (OUTPATIENT)
Dept: LAB | Facility: CLINIC | Age: 85
End: 2020-09-15

## 2020-09-15 DIAGNOSIS — E03.9 HYPOTHYROIDISM, UNSPECIFIED TYPE: ICD-10-CM

## 2020-09-15 DIAGNOSIS — M89.9 DISORDER OF BONE, UNSPECIFIED: ICD-10-CM

## 2020-09-16 ENCOUNTER — OFFICE VISIT - HEALTHEAST (OUTPATIENT)
Dept: CARDIOLOGY | Facility: CLINIC | Age: 85
End: 2020-09-16

## 2020-09-16 ENCOUNTER — COMMUNICATION - HEALTHEAST (OUTPATIENT)
Dept: ENDOCRINOLOGY | Facility: CLINIC | Age: 85
End: 2020-09-16

## 2020-09-16 DIAGNOSIS — I47.10 SVT (SUPRAVENTRICULAR TACHYCARDIA) (H): ICD-10-CM

## 2020-09-16 ASSESSMENT — MIFFLIN-ST. JEOR: SCORE: 923.39

## 2020-09-22 ENCOUNTER — OFFICE VISIT - HEALTHEAST (OUTPATIENT)
Dept: ENDOCRINOLOGY | Facility: CLINIC | Age: 85
End: 2020-09-22

## 2020-09-22 DIAGNOSIS — M81.0 SENILE OSTEOPOROSIS: ICD-10-CM

## 2020-10-08 ENCOUNTER — HOSPITAL ENCOUNTER (OUTPATIENT)
Dept: MAMMOGRAPHY | Facility: CLINIC | Age: 85
Discharge: HOME | End: 2020-10-08
Attending: FAMILY MEDICINE

## 2020-10-08 ENCOUNTER — COMMUNICATION - HEALTHEAST (OUTPATIENT)
Dept: FAMILY MEDICINE | Facility: CLINIC | Age: 85
End: 2020-10-08

## 2020-10-08 DIAGNOSIS — Z12.31 VISIT FOR SCREENING MAMMOGRAM: ICD-10-CM

## 2020-11-07 ENCOUNTER — COMMUNICATION - HEALTHEAST (OUTPATIENT)
Dept: FAMILY MEDICINE | Facility: CLINIC | Age: 85
End: 2020-11-07

## 2020-11-07 DIAGNOSIS — R79.89 OTHER SPECIFIED ABNORMAL FINDINGS OF BLOOD CHEMISTRY: ICD-10-CM

## 2020-11-09 ENCOUNTER — COMMUNICATION - HEALTHEAST (OUTPATIENT)
Dept: FAMILY MEDICINE | Facility: CLINIC | Age: 85
End: 2020-11-09

## 2020-11-09 DIAGNOSIS — R79.89 OTHER SPECIFIED ABNORMAL FINDINGS OF BLOOD CHEMISTRY: ICD-10-CM

## 2020-12-31 ENCOUNTER — AMBULATORY - HEALTHEAST (OUTPATIENT)
Dept: ENDOCRINOLOGY | Facility: CLINIC | Age: 85
End: 2020-12-31

## 2021-01-21 ENCOUNTER — OFFICE VISIT - HEALTHEAST (OUTPATIENT)
Dept: FAMILY MEDICINE | Facility: CLINIC | Age: 86
End: 2021-01-21

## 2021-01-21 DIAGNOSIS — L65.9 HAIR THINNING: ICD-10-CM

## 2021-01-21 DIAGNOSIS — L57.0 ACTINIC KERATOSIS: ICD-10-CM

## 2021-01-21 DIAGNOSIS — R09.81 NOSE CONGESTED: ICD-10-CM

## 2021-01-21 DIAGNOSIS — S41.111A SKIN TEAR OF RIGHT UPPER ARM WITHOUT COMPLICATION, INITIAL ENCOUNTER: ICD-10-CM
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
## 2021-01-21 ASSESSMENT — MIFFLIN-ST. JEOR: SCORE: 925.21

## 2021-02-09 ENCOUNTER — SURGERY - HEALTHEAST (OUTPATIENT)
Dept: SURGERY | Facility: HOSPITAL | Age: 86
End: 2021-02-09

## 2021-02-09 ENCOUNTER — ANESTHESIA - HEALTHEAST (OUTPATIENT)
Dept: SURGERY | Facility: HOSPITAL | Age: 86
End: 2021-02-09

## 2021-02-09 ASSESSMENT — MIFFLIN-ST. JEOR: SCORE: 905.82

## 2021-02-10 ENCOUNTER — COMMUNICATION - HEALTHEAST (OUTPATIENT)
Dept: SCHEDULING | Facility: CLINIC | Age: 86
End: 2021-02-10

## 2021-02-10 ASSESSMENT — MIFFLIN-ST. JEOR
SCORE: 933.94
SCORE: 933.5

## 2021-02-11 ASSESSMENT — MIFFLIN-ST. JEOR: SCORE: 953.44

## 2021-02-12 ASSESSMENT — MIFFLIN-ST. JEOR
SCORE: 960.25
SCORE: 1004.7

## 2021-02-13 ASSESSMENT — MIFFLIN-ST. JEOR: SCORE: 966.49

## 2021-02-15 ENCOUNTER — COMMUNICATION - HEALTHEAST (OUTPATIENT)
Dept: SURGERY | Facility: CLINIC | Age: 86
End: 2021-02-15

## 2021-02-18 ENCOUNTER — COMMUNICATION - HEALTHEAST (OUTPATIENT)
Dept: INTERNAL MEDICINE | Facility: CLINIC | Age: 86
End: 2021-02-18

## 2021-02-22 ENCOUNTER — OFFICE VISIT - HEALTHEAST (OUTPATIENT)
Dept: SURGERY | Facility: CLINIC | Age: 86
End: 2021-02-22

## 2021-02-22 ENCOUNTER — COMMUNICATION - HEALTHEAST (OUTPATIENT)
Dept: SURGERY | Facility: CLINIC | Age: 86
End: 2021-02-22

## 2021-02-22 DIAGNOSIS — K26.5 PERFORATED DUODENAL ULCER (H): ICD-10-CM

## 2021-02-23 ENCOUNTER — AMBULATORY - HEALTHEAST (OUTPATIENT)
Dept: SURGERY | Facility: CLINIC | Age: 86
End: 2021-02-23

## 2021-02-26 ENCOUNTER — OFFICE VISIT - HEALTHEAST (OUTPATIENT)
Dept: FAMILY MEDICINE | Facility: CLINIC | Age: 86
End: 2021-02-26

## 2021-02-26 DIAGNOSIS — Z09 HOSPITAL DISCHARGE FOLLOW-UP: ICD-10-CM

## 2021-02-26 DIAGNOSIS — K26.5 PERFORATED DUODENAL ULCER (H): ICD-10-CM

## 2021-02-26 LAB
BASOPHILS # BLD AUTO: 0.1 THOU/UL (ref 0–0.2)
BASOPHILS NFR BLD AUTO: 2 % (ref 0–2)
EOSINOPHIL # BLD AUTO: 0.6 THOU/UL (ref 0–0.4)
EOSINOPHIL NFR BLD AUTO: 11 % (ref 0–6)
ERYTHROCYTE [DISTWIDTH] IN BLOOD BY AUTOMATED COUNT: 14.3 % (ref 11–14.5)
HCT VFR BLD AUTO: 35.7 % (ref 35–47)
HGB BLD-MCNC: 11.7 G/DL (ref 12–16)
IMM GRANULOCYTES # BLD: 0 THOU/UL
IMM GRANULOCYTES NFR BLD: 0 %
LYMPHOCYTES # BLD AUTO: 1 THOU/UL (ref 0.8–4.4)
LYMPHOCYTES NFR BLD AUTO: 17 % (ref 20–40)
MCH RBC QN AUTO: 31 PG (ref 27–34)
MCHC RBC AUTO-ENTMCNC: 32.8 G/DL (ref 32–36)
MCV RBC AUTO: 95 FL (ref 80–100)
MONOCYTES # BLD AUTO: 0.7 THOU/UL (ref 0–0.9)
MONOCYTES NFR BLD AUTO: 13 % (ref 2–10)
NEUTROPHILS # BLD AUTO: 3.2 THOU/UL (ref 2–7.7)
NEUTROPHILS NFR BLD AUTO: 57 % (ref 50–70)
PLATELET # BLD AUTO: 355 THOU/UL (ref 140–440)
PMV BLD AUTO: 8.9 FL (ref 7–10)
RBC # BLD AUTO: 3.77 MILL/UL (ref 3.8–5.4)
WBC: 5.6 THOU/UL (ref 4–11)

## 2021-02-26 ASSESSMENT — ANXIETY QUESTIONNAIRES
2. NOT BEING ABLE TO STOP OR CONTROL WORRYING: NOT AT ALL
IF YOU CHECKED OFF ANY PROBLEMS ON THIS QUESTIONNAIRE, HOW DIFFICULT HAVE THESE PROBLEMS MADE IT FOR YOU TO DO YOUR WORK, TAKE CARE OF THINGS AT HOME, OR GET ALONG WITH OTHER PEOPLE: SOMEWHAT DIFFICULT
6. BECOMING EASILY ANNOYED OR IRRITABLE: SEVERAL DAYS
7. FEELING AFRAID AS IF SOMETHING AWFUL MIGHT HAPPEN: NOT AT ALL
4. TROUBLE RELAXING: SEVERAL DAYS
3. WORRYING TOO MUCH ABOUT DIFFERENT THINGS: NOT AT ALL
5. BEING SO RESTLESS THAT IT IS HARD TO SIT STILL: NOT AT ALL
GAD7 TOTAL SCORE: 3
1. FEELING NERVOUS, ANXIOUS, OR ON EDGE: SEVERAL DAYS

## 2021-02-26 ASSESSMENT — PATIENT HEALTH QUESTIONNAIRE - PHQ9: SUM OF ALL RESPONSES TO PHQ QUESTIONS 1-9: 3

## 2021-02-26 ASSESSMENT — MIFFLIN-ST. JEOR: SCORE: 913.07

## 2021-03-01 ENCOUNTER — RECORDS - HEALTHEAST (OUTPATIENT)
Dept: ADMINISTRATIVE | Facility: OTHER | Age: 86
End: 2021-03-01

## 2021-03-01 ENCOUNTER — COMMUNICATION - HEALTHEAST (OUTPATIENT)
Dept: SURGERY | Facility: CLINIC | Age: 86
End: 2021-03-01

## 2021-03-01 ENCOUNTER — COMMUNICATION - HEALTHEAST (OUTPATIENT)
Dept: FAMILY MEDICINE | Facility: CLINIC | Age: 86
End: 2021-03-01

## 2021-03-01 DIAGNOSIS — K26.5 PERFORATED DUODENAL ULCER (H): ICD-10-CM

## 2021-03-03 ENCOUNTER — RECORDS - HEALTHEAST (OUTPATIENT)
Dept: FAMILY MEDICINE | Facility: CLINIC | Age: 86
End: 2021-03-03

## 2021-03-03 ENCOUNTER — COMMUNICATION - HEALTHEAST (OUTPATIENT)
Dept: SURGERY | Facility: CLINIC | Age: 86
End: 2021-03-03

## 2021-03-03 DIAGNOSIS — Z12.31 VISIT FOR SCREENING MAMMOGRAM: ICD-10-CM

## 2021-03-11 ENCOUNTER — COMMUNICATION - HEALTHEAST (OUTPATIENT)
Dept: CARDIOLOGY | Facility: CLINIC | Age: 86
End: 2021-03-11

## 2021-04-08 ENCOUNTER — OFFICE VISIT - HEALTHEAST (OUTPATIENT)
Dept: FAMILY MEDICINE | Facility: CLINIC | Age: 86
End: 2021-04-08

## 2021-04-08 DIAGNOSIS — Z01.818 PREOP GENERAL PHYSICAL EXAM: ICD-10-CM

## 2021-04-08 DIAGNOSIS — R06.00 DYSPNEA, UNSPECIFIED TYPE: ICD-10-CM

## 2021-04-08 DIAGNOSIS — E78.5 HYPERLIPIDEMIA, UNSPECIFIED HYPERLIPIDEMIA TYPE: ICD-10-CM

## 2021-04-08 DIAGNOSIS — L57.0 ACTINIC KERATOSIS: ICD-10-CM

## 2021-04-08 DIAGNOSIS — E03.9 HYPOTHYROIDISM, UNSPECIFIED TYPE: ICD-10-CM

## 2021-04-08 DIAGNOSIS — R79.89 ELEVATED BRAIN NATRIURETIC PEPTIDE (BNP) LEVEL: ICD-10-CM

## 2021-04-08 LAB
ALBUMIN SERPL-MCNC: 3.8 G/DL (ref 3.5–5)
ALP SERPL-CCNC: 83 U/L (ref 45–120)
ALT SERPL W P-5'-P-CCNC: 10 U/L (ref 0–45)
ANION GAP SERPL CALCULATED.3IONS-SCNC: 12 MMOL/L (ref 5–18)
AST SERPL W P-5'-P-CCNC: 17 U/L (ref 0–40)
BASOPHILS # BLD AUTO: 0.1 THOU/UL (ref 0–0.2)
BASOPHILS NFR BLD AUTO: 2 % (ref 0–2)
BILIRUB SERPL-MCNC: 0.6 MG/DL (ref 0–1)
BNP SERPL-MCNC: 204 PG/ML (ref 0–167)
BUN SERPL-MCNC: 16 MG/DL (ref 8–28)
CALCIUM SERPL-MCNC: 9.1 MG/DL (ref 8.5–10.5)
CHLORIDE BLD-SCNC: 108 MMOL/L (ref 98–107)
CHOLEST SERPL-MCNC: 203 MG/DL
CO2 SERPL-SCNC: 23 MMOL/L (ref 22–31)
CREAT SERPL-MCNC: 0.86 MG/DL (ref 0.6–1.1)
EOSINOPHIL # BLD AUTO: 0.4 THOU/UL (ref 0–0.4)
EOSINOPHIL NFR BLD AUTO: 7 % (ref 0–6)
ERYTHROCYTE [DISTWIDTH] IN BLOOD BY AUTOMATED COUNT: 13.3 % (ref 11–14.5)
FASTING STATUS PATIENT QL REPORTED: YES
GFR SERPL CREATININE-BSD FRML MDRD: >60 ML/MIN/1.73M2
GLUCOSE BLD-MCNC: 90 MG/DL (ref 70–125)
HCT VFR BLD AUTO: 39.6 % (ref 35–47)
HDLC SERPL-MCNC: 86 MG/DL
HGB BLD-MCNC: 12.9 G/DL (ref 12–16)
IMM GRANULOCYTES # BLD: 0 THOU/UL
IMM GRANULOCYTES NFR BLD: 0 %
LDLC SERPL CALC-MCNC: 99 MG/DL
LYMPHOCYTES # BLD AUTO: 1.4 THOU/UL (ref 0.8–4.4)
LYMPHOCYTES NFR BLD AUTO: 26 % (ref 20–40)
MCH RBC QN AUTO: 30.7 PG (ref 27–34)
MCHC RBC AUTO-ENTMCNC: 32.6 G/DL (ref 32–36)
MCV RBC AUTO: 94 FL (ref 80–100)
MONOCYTES # BLD AUTO: 0.3 THOU/UL (ref 0–0.9)
MONOCYTES NFR BLD AUTO: 6 % (ref 2–10)
NEUTROPHILS # BLD AUTO: 3.2 THOU/UL (ref 2–7.7)
NEUTROPHILS NFR BLD AUTO: 59 % (ref 50–70)
PLATELET # BLD AUTO: 270 THOU/UL (ref 140–440)
PMV BLD AUTO: 9.9 FL (ref 7–10)
POTASSIUM BLD-SCNC: 4.4 MMOL/L (ref 3.5–5)
PROT SERPL-MCNC: 6.4 G/DL (ref 6–8)
RBC # BLD AUTO: 4.2 MILL/UL (ref 3.8–5.4)
SODIUM SERPL-SCNC: 143 MMOL/L (ref 136–145)
TRIGL SERPL-MCNC: 89 MG/DL
TSH SERPL DL<=0.005 MIU/L-ACNC: 1.64 UIU/ML (ref 0.3–5)
WBC: 5.4 THOU/UL (ref 4–11)

## 2021-04-08 ASSESSMENT — MIFFLIN-ST. JEOR: SCORE: 913.98

## 2021-04-09 ENCOUNTER — COMMUNICATION - HEALTHEAST (OUTPATIENT)
Dept: FAMILY MEDICINE | Facility: CLINIC | Age: 86
End: 2021-04-09

## 2021-04-09 ENCOUNTER — COMMUNICATION - HEALTHEAST (OUTPATIENT)
Dept: CARDIOLOGY | Facility: CLINIC | Age: 86
End: 2021-04-09

## 2021-04-09 LAB
SARS-COV-2 PCR COMMENT: NORMAL
SARS-COV-2 RNA SPEC QL NAA+PROBE: NEGATIVE
SARS-COV-2 VIRUS SPECIMEN SOURCE: NORMAL

## 2021-04-10 ENCOUNTER — COMMUNICATION - HEALTHEAST (OUTPATIENT)
Dept: SCHEDULING | Facility: CLINIC | Age: 86
End: 2021-04-10

## 2021-05-07 ENCOUNTER — COMMUNICATION - HEALTHEAST (OUTPATIENT)
Dept: FAMILY MEDICINE | Facility: CLINIC | Age: 86
End: 2021-05-07

## 2021-05-07 DIAGNOSIS — R79.89 OTHER SPECIFIED ABNORMAL FINDINGS OF BLOOD CHEMISTRY: ICD-10-CM

## 2021-05-07 RX ORDER — LEVOTHYROXINE SODIUM 25 UG/1
TABLET ORAL
Qty: 90 TABLET | Refills: 2 | Status: SHIPPED | OUTPATIENT
Start: 2021-05-07 | End: 2021-08-31

## 2021-05-19 ENCOUNTER — OFFICE VISIT - HEALTHEAST (OUTPATIENT)
Dept: CARDIOLOGY | Facility: CLINIC | Age: 86
End: 2021-05-19

## 2021-05-19 DIAGNOSIS — I47.10 SVT (SUPRAVENTRICULAR TACHYCARDIA) (H): ICD-10-CM

## 2021-05-19 DIAGNOSIS — I34.0 MITRAL VALVE INSUFFICIENCY, UNSPECIFIED ETIOLOGY: ICD-10-CM

## 2021-05-19 ASSESSMENT — MIFFLIN-ST. JEOR: SCORE: 937.57

## 2021-05-25 ENCOUNTER — RECORDS - HEALTHEAST (OUTPATIENT)
Dept: ADMINISTRATIVE | Facility: CLINIC | Age: 86
End: 2021-05-25

## 2021-05-26 ENCOUNTER — RECORDS - HEALTHEAST (OUTPATIENT)
Dept: ADMINISTRATIVE | Facility: CLINIC | Age: 86
End: 2021-05-26

## 2021-05-27 ENCOUNTER — RECORDS - HEALTHEAST (OUTPATIENT)
Dept: ADMINISTRATIVE | Facility: CLINIC | Age: 86
End: 2021-05-27

## 2021-05-27 VITALS
WEIGHT: 127 LBS | DIASTOLIC BLOOD PRESSURE: 60 MMHG | HEIGHT: 60 IN | RESPIRATION RATE: 16 BRPM | BODY MASS INDEX: 24.94 KG/M2 | SYSTOLIC BLOOD PRESSURE: 108 MMHG | HEART RATE: 64 BPM

## 2021-05-27 VITALS — DIASTOLIC BLOOD PRESSURE: 68 MMHG | SYSTOLIC BLOOD PRESSURE: 112 MMHG

## 2021-05-27 ASSESSMENT — PATIENT HEALTH QUESTIONNAIRE - PHQ9: SUM OF ALL RESPONSES TO PHQ QUESTIONS 1-9: 3

## 2021-05-27 NOTE — TELEPHONE ENCOUNTER
Refill Approved    Rx renewed per Medication Renewal Policy. Medication was last renewed on 1/16/2019 with 2 refills.  Last office visit: 2/25/2019 with Dr DHEERAJ Trent.     Meredith Wise, [facility] Triage/Med Refill 3/28/2019     Requested Prescriptions   Pending Prescriptions Disp Refills     levothyroxine (SYNTHROID, LEVOTHROID) 25 MCG tablet 90 tablet 2     Sig: Take 1 tablet (25 mcg total) by mouth Daily at 6:00 am.    Thyroid Hormones Protocol Passed - 3/28/2019  4:15 PM       Passed - Provider visit in past 12 months or next 3 months    Last office visit with prescriber/PCP: 2/25/2019 Eduardo Trent MD OR same dept: 2/25/2019 Eduardo Trent MD OR same specialty: 2/25/2019 Eduardo Trent MD  Last physical: Visit date not found Last MTM visit: Visit date not found   Next visit within 3 mo: Visit date not found  Next physical within 3 mo: Visit date not found  Prescriber OR PCP: Eduardo Trent MD  Last diagnosis associated with med order: 1. Abnormal Blood Chemistry  - levothyroxine (SYNTHROID, LEVOTHROID) 25 MCG tablet; Take 1 tablet (25 mcg total) by mouth Daily at 6:00 am.  Dispense: 90 tablet; Refill: 2    If protocol passes may refill for 12 months if within 3 months of last provider visit (or a total of 15 months).            Passed - TSH on file in past 12 months for patient age 12 & older    TSH   Date Value Ref Range Status   07/09/2018 1.79 0.30 - 5.00 uIU/mL Final

## 2021-05-27 NOTE — TELEPHONE ENCOUNTER
"Refill Request  Did you contact pharmacy: Yes  Medication name:   Requested Prescriptions     Pending Prescriptions Disp Refills     levothyroxine (SYNTHROID, LEVOTHROID) 25 MCG tablet 90 tablet 2     Sig: Take 1 tablet (25 mcg total) by mouth Daily at 6:00 am.     Who prescribed the medication: Dr Trent        Pharmacy Name and Location: Changing pharmacy to [facility].  Patient states would like 90 day supply with \"many\" refills.        Okay to leave a detailed message: yes  "

## 2021-05-28 ENCOUNTER — RECORDS - HEALTHEAST (OUTPATIENT)
Dept: ADMINISTRATIVE | Facility: CLINIC | Age: 86
End: 2021-05-28

## 2021-05-28 ASSESSMENT — ANXIETY QUESTIONNAIRES: GAD7 TOTAL SCORE: 3

## 2021-05-29 NOTE — TELEPHONE ENCOUNTER
Patient wanting to schedule follow up on a Monday in [place]. The August schedule is not out yet and patient wishes to schedule.    Informed patient I do not schedule and cannot manipulate the schedule. Dr. Contreras is not in  clinic on any Monday in August.   Encouraged her to flex her schedule so that she can schedule follow up in August. She is agreeable to be schedule a Monday appointment at [facility] location in August. Informed patient Dr. Contreras will be in the [facility] clinic the week on August 8-12.   Informed patient I will send a message to the  to please call patient for 8/12/19 appointment a [facility] when the schedule becomes available.    Message sent to  with request to schedule patient @  August 8-12.

## 2021-05-29 NOTE — TELEPHONE ENCOUNTER
----- Message from Radha Jameson sent at 6/7/2019  8:15 AM CDT -----  Contact: Pt  Caller: Maria D    Primary cardiologist: Dr. Contreras    Detailed reason for call: Has health concerns, not short of breath, no chest pain. Please call back.     Best phone number: [phone]    Best time to contact: Today    Ok to leave a detailed message? Yes    Device? No

## 2021-05-31 VITALS — BODY MASS INDEX: 24.73 KG/M2 | WEIGHT: 131 LBS | HEIGHT: 61 IN

## 2021-05-31 VITALS — WEIGHT: 130 LBS | BODY MASS INDEX: 24.56 KG/M2

## 2021-05-31 VITALS — WEIGHT: 126 LBS | HEIGHT: 61 IN | BODY MASS INDEX: 23.79 KG/M2

## 2021-05-31 VITALS — BODY MASS INDEX: 24.36 KG/M2 | WEIGHT: 128.9 LBS

## 2021-06-01 VITALS — BODY MASS INDEX: 23.62 KG/M2 | WEIGHT: 125 LBS

## 2021-06-01 VITALS — WEIGHT: 127 LBS | HEIGHT: 60 IN | BODY MASS INDEX: 24.94 KG/M2

## 2021-06-01 VITALS — WEIGHT: 125.7 LBS | BODY MASS INDEX: 24.68 KG/M2 | HEIGHT: 60 IN

## 2021-06-01 VITALS — BODY MASS INDEX: 24.58 KG/M2 | HEIGHT: 60 IN | WEIGHT: 125.2 LBS

## 2021-06-01 VITALS — WEIGHT: 125 LBS | BODY MASS INDEX: 23.62 KG/M2

## 2021-06-01 VITALS — BODY MASS INDEX: 25.32 KG/M2 | HEIGHT: 60 IN | WEIGHT: 129 LBS

## 2021-06-01 VITALS — WEIGHT: 129 LBS | BODY MASS INDEX: 25.32 KG/M2 | HEIGHT: 60 IN

## 2021-06-01 NOTE — TELEPHONE ENCOUNTER
Test Results  Who is calling?:  The patient  Who ordered the test:  Dr. Trent  Type of test: Lab  Date of test:  8/6/2019  Where was the test performed:  [facility]  What are your questions/concerns?:  The patient would like results phoned to her. The patient would like this expedited and the results  no later than 9/12/2019 at 1:00.   Okay to leave a detailed message?:  Yes

## 2021-06-01 NOTE — TELEPHONE ENCOUNTER
Patient Returning Call  Reason for call:  Calling on status of fax request.  Information relayed to patient:  Told patient that not completed as of this call. The patient was looking for the reuslt, which she says she was told, will be leaving office for fax # at [phone].  The patient would be fine is clinic would mail her a copy of the results to her home address.  Does not need the fax sent if the information is put in the mail  Patient has additional questions:  No  If YES, what are your questions/concerns:  NA  Okay to leave a detailed message?: No call back needed

## 2021-06-01 NOTE — TELEPHONE ENCOUNTER
Left message to call back for: Results  Information to relay to patient:  Phone line was busy, unable to leave vm. Please relay message below from .

## 2021-06-01 NOTE — PROGRESS NOTES
[facility]  ENDOCRINOLOGY    Osteoporosis Follow Up 9/20/2019    Maria D Moore, 11/24/1934, [id number]          Reason for visit      1. Osteoporosis Senile        History     Maria D Moore is a very pleasant 84 y.o. old female who presents for follow up.   SUMMARY:  As you know, she took a fall when she became syncopal with atrial fibrillation on August 13th, 2010. Initially plain radiographs were negative but with persistent pain. A subsequent CT scan on September 26th, 2010, did show the sacral insufficiency fractures both sacral wings extending into the S2 sacral body and nondisplaced fracture of the right pubic bone. MRI in September 2010 confirmed these findings. A followup CT done on March 9 2011, indicates again a broad based bilateral sacral insufficiency fractures and ununited fracture of the right pubic bone. She is here for further recommendations. I should note that at the time she was hospitalized with  her fractures, she was told that her vitamin D level was low and she was treated with 12746 International Units of vitamin D weekly for three months. Subsequently, her level chuck just above 50 ng/mL has had serious  pelvic fractures while on bisphosphonates for at least six years. A 78-year-old woman with severe osteoporosis who has completed 2 years of teriparatide.  We discussed options for following with antiresorptive agent and I do think that denosumab would be preferable for her and she was in agreement with this plan.       TODAY:  Maria D returns today in f/u for Osteoporosis. She is full of tales of her latest trip (to [place]) and that her next one will be to [place]. She has been getting Prolia injections for about 5 years now, and is due for one today. She has had no difficulties with the injections.  Her last Dexa Scan showed positive response. Her current Vit D level is 62.1 and Calcium level is 10.4. She is taking both in supplementation.         Risk Factors     The following high-  risk conditions have been ruled out: celiac disease, eating disorders, gastric bypass, hyperparathyroidism, inflammatory bowel disease, hyperthyroidism, rheumatoid arthritis, lupus, chronic kidney disease.     Maria D Moore has the following risk factors: Age, Female gender,  and Low BMI     She is not on high risk medications such as glucocorticoids, anti-coagulants, anti-convulsants, chemotherapy or levothyroxine.    Patient deniesHysterectomy, Oophrectomy, Breast cancer and Family history of breast cancer.      Past Medical History     Patient Active Problem List   Diagnosis     Fatigue     Temperature Intolerance To Cold   (Consistent)     Abnormal Blood Chemistry     Osteoarthritis Of The Ankle/Foot (Multiple Joints)     Mixed hyperlipidemia     Furuncle     Generalized Osteoarthritis Of Multiple Sites     Edema     Osteoporosis Senile     Vaginal Discharge (Symptom)     Back Pain     Automatic Atrial Tachycardia     Lightheadedness     SVT (supraventricular tachycardia) (H)     Hypotension, unspecified hypotension type       Family History       family history includes Stroke in her father.    Social History      reports that she has never smoked. She has never used smokeless tobacco. She reports that she does not drink alcohol or use drugs.      Review of Systems     Patient denies current pain, limited mobility, fractures.   Remainder per HPI.      Vital Signs     /64 (Patient Site: Right Arm, Patient Position: Sitting, Cuff Size: Adult Regular)   Pulse 80     Physical Exam     GENERAL:  Normal, NIRD  EYES:  Pupils equal, round and reactive to light; no proptosis, lid lag or  periorbital edema.  THYROID:  Thyroid is normal.  No tenderness or bruit  NECK: No lymph nodes  MUSCULOSKELETAL: No joint abnormalities, FROM in all four extremities. No kyphosis. Muscle strength grossly normal without evidence of wasting.  HEART:  Regular rate and rhythm without murmur.  LUNGS:  Clear to  auscultation.  ABDOMEN:Soft, non-tender, no masses or organomegaly  NEURO:  Patella Reflexes were normal.No tremors  SKIN:  No acanthosis nigricans or vitiligo        Assessment     1. Osteoporosis Senile        Plan     Maria D will get her next Prolia today. She will get her next Dexa Scan in 2020.  She will see me next year after her trip.        Total visit minutes:25  Time spent counseling and coordination of care:23    Deisy Mckeon   Endocrinology  9/20/2019  5:42 AM        Current Medications     Outpatient Medications Prior to Visit   Medication Sig Dispense Refill     aspirin 325 MG tablet Take 325 mg by mouth daily.       calcium carbonate (OS-DOMONIQUE) 600 mg (1,500 mg) tablet Take 600 mg by mouth daily.       cholecalciferol, vitamin D3, 5,000 unit Tab Take by mouth. Take 1 tablet daily.       ciprofloxacin HCl (CIPRO) 500 MG tablet 500mg once 1 hr prior to procedure 4 tablet 0     hydrocortisone 0.5 % cream Apply to affected area twice a day. (Patient taking differently: Apply topically 2 (two) times a day as needed. Apply to affected area twice a day.) 30 g 0     levothyroxine (SYNTHROID, LEVOTHROID) 25 MCG tablet Take 1 tablet (25 mcg total) by mouth Daily at 6:00 am. 90 tablet 3     metoprolol succinate (TOPROL-XL) 50 MG 24 hr tablet Take 1 tablet (50 mg total) by mouth at bedtime. 90 tablet 3     mupirocin (BACTROBAN) 2 % ointment Apply topically 3 (three) times a day as needed. 30 g 0     niacin 500 MG tablet Take 500 mg by mouth daily with breakfast.       OMEGA-3/DHA/EPA/FISH OIL (FISH OIL-OMEGA-3 FATTY ACIDS) 300-1,000 mg capsule Take 2,000 mg by mouth daily.        simvastatin (ZOCOR) 20 MG tablet TAKE 1 TABLET DAILY AT BEDTIME 90 tablet 2     sodium fluoride-pot nitrate (PREVIDENT 5000 ENAMEL PROTECT) 1.1-5 % Pste Apply to teeth.       traMADol (ULTRAM) 50 mg tablet Take 1 tablet (50 mg total) by mouth every 8 (eight) hours as needed for pain. 69 tablet 0     triamcinolone (KENALOG) 0.1 % cream  Apply topically 2 (two) times a day. Apply ointment to affected areas twice daily until rash is gone. 45 g 0     VIT C/E/ZN/COPPR/LUTEIN/ZEAXAN (PRESERVISION AREDS 2 ORAL) Take 1 tablet by mouth 2 (two) times a day.       Facility-Administered Medications Prior to Visit   Medication Dose Route Frequency Provider Last Rate Last Dose     denosumab 60 mg (PROLIA 60 mg/ml)  60 mg Subcutaneous Q6 Months Deisy Mckeon, NP   60 mg at 09/18/19 0832         Lab Results     TSH   Date Value Ref Range Status   09/11/2019 1.42 0.30 - 5.00 uIU/mL Final     PTH   Date Value Ref Range Status   07/26/2016 34 10 - 86 pg/mL Final     Calcium   Date Value Ref Range Status   09/11/2019 10.4 8.5 - 10.5 mg/dL Final     Phosphorus   Date Value Ref Range Status   07/26/2016 3.8 2.5 - 4.5 mg/dL Final           Imaging Results   Last DEXA scan:  Results for orders placed in visit on 06/27/18   DXA Bone Density Scan    Narrative 8/31/2018      RE: Maria D Moore  YOB: 1934        Dear Deisy Mckeon,    Patient Profile:  83 y.o. female, postmenopausal, is here for the follow up bone density   test.   History of fractures - Yes; Foot and tailbone area. Family history of   osteoporosis - None.  Family history of hip fracture: None. Smoking   history - No. Osteoporosis treatment past -  Yes;  Bisphosphonates and   Forteo. Osteoporosis treatment current - Yes;  Prolia.  Chronic medical   problems - Hysterectomy and Oophorectomy. High risk medications -    Thyroid;  Yes, Currently (Just started).      Assessment:    1. The spine bone density L1-L2 with T-score -0.8.  Of note, sclerotic   changes are noted in the lower lumbar vertebrae, which may artifactually   elevate the bone mineral density.  2. Femoral bone densities show left femoral neck T- score -1.6 and right   total hip T-score -1.4.  3. Trabecular bone score indicates good trabecular bone architecture.  4.  Left one third radius T score -3.4.    83 y.o. female with  OSTEOPOROSIS and HIGH fracture risk.     Of note, the previous bone densitometry is from 2016 and 2014 were done at   a different facility.  Therefore, those results are not directly   comparable.  However, a previous bone density dated  July 20, 2010 was   done on this scanner. There has been a   7.3 % increase in the bone   density of the spine.  Additionally there has been a 5.0% increase in the   left total hip and no statistically significant  change in the right total   hip.    Recommendations:  Continuation of treatment for osteoporosis is recommended with follow up   bone density scan in 2 years.      Bone densitometry was performed on your patient using our [employer]   densitometer. The results are summarized and a copy of the actual scans   are included for your review. In conformity with the International Society   of Clinical Densitometry's most recent position statement for DXA   interpretation (2015), the diagnosis will be made on the lowest measured   T-score of the lumbar spine, femoral neck, total proximal femur or 33%   radius. Note the change in terminology for diagnostic classification from   OSTEOPENIA to LOW BONE MASS. All trending for sequential exams will be   done using multiple vertebrae or the total proximal femur. Fracture risk   is based on the WHO Fracture Risk Assessment Tool (FRAX). If additional   information is needed or if you would like to discuss the results, please   do not hesitate to call me.       Thank you for referring this patient to [facility].   We are happy to be of service in support of you and your practice. If you   have any questions or suggestions to improve our service, please call me   at [phone].     Sincerely,     Shaista Caruso M.D. C.C.CONSUELO.  Osteoporosis Services, [facility]           Prolia Injection Phone Screen      Screening questions have been asked 2-3 days prior to administration visit for Prolia. If any questions  "are answered with \"Yes,\" this phone encounter were will routed to ordering provider for further evaluation.     1.  When was the last injection?  3/18/19    2.  Has insurance for this injection been verified?  Yes    3.  Did you experience any new onset achiness or rashes that lasted for over a month with your previous Prolia injection?   No    4.  Do you have a fever over 101?F or a new deep cough that is unusual for you today? No    5.  Have you started any new medications in the last 6 months that you were told could affect your immune system? These may have been prescribed by oncologist, transplant, rheumatology, or dermatology.   No    6.  In the last 6 months have you have gastric bypass or parathyroid surgery?   No    7.  Do you plan dental work requiring drilling into the bone such as implants/extractions or oral surgery in the next 2-3 months?   No    8. Do you have new insurance since the last injection?    Patient informed if symptoms discussed above present prior to their administration appointment, they are to notify clinic immediately.     Minda Kim            The following steps were completed to comply with the REMS program for Prolia:  1. Ordering provider has previously reviewed information in the Medication Guide and Patient Counseling Chart, including the serious risks of Prolia  and the symptoms of each risk and have been advised to seek prompt medical attention if they have signs or symptoms of any of the serious risks.  2. Provided each patient a copy of the Medication Guide and Patient Brochure.  See MAR for administration details.   Indication: Prolia  (denosumab) is a prescription medicine used to treat osteoporosis in patients who:   Are at high risk for fracture, meaning patients who have had a fracture related to osteoporosis, or who have multiple risk factors for fracture; Cannot use another osteoporosis medicine or other osteoporosis medicines did not work well.   The timeline " for early/late injections would be 4 weeks early and any time after the 6 month nomi. If a patient receives their injection late, then the subsequent injection would be 6 months from the date that they actually received the injection    Have the screening questions been asked prior to this administration? Yes      After obtaining consent, and per orders of Deisy Mckeon NP, injection of Prolia 60 mg given by Minda Kim. Patient instructed to remain in clinic for 20 minutes afterwards, and to report any adverse reaction to me immediately.

## 2021-06-01 NOTE — TELEPHONE ENCOUNTER
----- Message from Eduardo Trent MD sent at 9/12/2019  9:04 AM CDT -----  Please inform patient that not all results are back yet.  Thyroid level is normal.  Kidney function and liver function are normal.  No signs of diabetes.  Cholesterol levels are at goal range.

## 2021-06-01 NOTE — TELEPHONE ENCOUNTER
Who is calling:  Patient  Reason for Call:  Patient is asking about the status of her results. Relayed they were sent out 9/12/19 1:54 pm.  Date of last appointment with primary care: 8/6/19  Okay to leave a detailed message: Yes

## 2021-06-01 NOTE — TELEPHONE ENCOUNTER
Who is calling:  Patient   Reason for Call:  Patient called is requesting her lab results be faxed to her at [phone].    Writer attempted to relay message from Eduardo Trent MD regarding lab results, but caller did not allow this and again asked for the lab results to be faxed to her.  Date of last appointment with primary care:  2/5/2019  Okay to leave a detailed message: No

## 2021-06-02 ENCOUNTER — RECORDS - HEALTHEAST (OUTPATIENT)
Dept: ADMINISTRATIVE | Facility: CLINIC | Age: 86
End: 2021-06-02

## 2021-06-02 VITALS — WEIGHT: 127 LBS | BODY MASS INDEX: 24.94 KG/M2 | HEIGHT: 60 IN

## 2021-06-02 VITALS — HEIGHT: 60 IN | BODY MASS INDEX: 24.54 KG/M2 | WEIGHT: 125 LBS

## 2021-06-02 NOTE — TELEPHONE ENCOUNTER
Called Radiology to see if they could read the US results ASAP.   Radiology stated they will get to the results as soon as they can..  Patient is expecting to hear back from clinic by the end of the day.

## 2021-06-02 NOTE — PROGRESS NOTES
ASSESSMENT & PLAN:  1.  Status post joint replacement  States she needs prophylactic antibiotics prior to dental work due to joint replacement.  She requests refill today.  Penicillin allergy.  - ciprofloxacin HCl (CIPRO) 500 MG tablet; 500mg once 1 hr prior to procedure  Dispense: 4 tablet; Refill: 0    2.  Hypothyroidism  TSH from 9/11/2019 reviewed and within normal limits.  Refill provided.  - levothyroxine (SYNTHROID, LEVOTHROID) 25 MCG tablet; Take 1 tablet (25 mcg total) by mouth Daily at 6:00 am.  Dispense: 90 tablet; Refill: 3    3. SVT (supraventricular tachycardia) (H)  - metoprolol succinate (TOPROL-XL) 50 MG 24 hr tablet; Take 1 tablet (50 mg total) by mouth at bedtime.  Dispense: 90 tablet; Refill: 3    4. Hyperlipidemia  Recent fasting lipid panel and CMP reviewed  - simvastatin (ZOCOR) 20 MG tablet; TAKE 1 TABLET DAILY AT BEDTIME  Dispense: 90 tablet; Refill: 3    5. Mass of left side of neck  Recommend we further evaluate this with ultrasound.  It is mobile, suspect benign, but needs further evaluation to determine.  - US Neck Limited; Future    6. Travel advice encounter  Discussed CDC travel recommendations for [place].  She was given a printed copy from CDC website.  She declines typhoid vaccine and she will be low risk given her travel plans.  She has had hepatitis A vaccines.  We discussed hepatitis B vaccines but she declines.  Based on CDC travel recommendations she does not need other vaccines, does not need malaria prophylaxis.  Discussed antibiotic to be used as needed for traveler's diarrhea and she would like a printed prescription for this.  Discussed with patient when to use over-the-counter diarrhea medication versus Cipro versus when to be seen.  - ciprofloxacin HCl (CIPRO) 500 MG tablet; One tablet twice daily for up to 3 days for traveler's diarrhea  Dispense: 6 tablet; Refill: 0    7.  Pruritus, back  Skin is dry but no other findings on her back.  Recommend adequate moisturizing and  follow-up if needed    8.  Possible recent anal leakage   She denies any blood.  She should follow-up if this happens again and it could be further evaluated.      There are no Patient Instructions on file for this visit.    Orders Placed This Encounter   Procedures     US Neck Limited     Standing Status:   Future     Standing Expiration Date:   10/15/2020     Order Specific Question:   Reason for Exam (Describe Symptoms):     Answer:   palpable firm lymph node left anterior cervical     Order Specific Question:   Can the procedure be changed per Radiologist protocol?     Answer:   Yes     Medications Discontinued During This Encounter   Medication Reason     ciprofloxacin HCl (CIPRO) 500 MG tablet Reorder     levothyroxine (SYNTHROID, LEVOTHROID) 25 MCG tablet Reorder     metoprolol succinate (TOPROL-XL) 50 MG 24 hr tablet Reorder     simvastatin (ZOCOR) 20 MG tablet Reorder       No follow-ups on file.    CHIEF COMPLAINT:  Chief Complaint   Patient presents with     Medication Request     requesting refills     Rash     Back rash, itchy       HISTORY OF PRESENT ILLNESS:  Maria D is a 84 y.o. female presenting to the clinic today for med check, rash, travel consult    Hypothyroidism: She is on a small dose of levothyroxine.  She is had a TSH checked, within normal limits.  Needs refill for the year.    Hypertension: Needs refill of her metoprolol.  No concerns.    Hyperlipidemia: She needs a refill of her statin.  No side effects or problems.  Recent fasting lipids on 9/11/2019 were excellent.  Normal CMP at that time as well.    Travel consult: Patient travels to [place] in May for a couple of weeks.  Traveling with a tourist group.  Very careful about what she eats and drinks.  She will be only in tourist areas such as [facility]. has traveled extensively in the past.  Had traveler's diarrhea in [place] and wants to be prepared if this happens in [place].    Itching: She has had some itching on her back recently.  " Wonders if there is a rash or if there are any moles that need to be treated.  States she has had skin lesions frozen off of her back in the past.  She cannot see the area.    REVIEW OF SYSTEMS:   Follows with endocrinology for Prolia for osteoporosis and follows with cardiology for history of SVT status post ablation.  Reports a couple of weeks ago she saw some brown staining in her underwear, states she is certain it was not dried blood.  History of AUBREE/BSO.  All other systems are negative.    PFSH:  Patient Active Problem List   Diagnosis     Fatigue     Temperature Intolerance To Cold   (Consistent)     Abnormal Blood Chemistry     Osteoarthritis Of The Ankle/Foot (Multiple Joints)     Mixed hyperlipidemia     Furuncle     Generalized Osteoarthritis Of Multiple Sites     Edema     Osteoporosis Senile     Vaginal Discharge (Symptom)     Back Pain     Automatic Atrial Tachycardia     Lightheadedness     SVT (supraventricular tachycardia) (H)     Hypotension, unspecified hypotension type        TOBACCO USE:  Social History     Tobacco Use   Smoking Status Never Smoker   Smokeless Tobacco Never Used       VITALS:  Vitals:    10/15/19 1445   BP: 113/58   Patient Site: Left Arm   Patient Position: Sitting   Cuff Size: Adult Regular   Pulse: 63   Temp: (!) 95.7  F (35.4  C)   TempSrc: Oral   SpO2: 98%   Weight: 129 lb 6.4 oz (58.7 kg)   Height: 5' 0.25\" (1.53 m)     Wt Readings from Last 3 Encounters:   10/15/19 129 lb 6.4 oz (58.7 kg)   08/06/19 127 lb (57.6 kg)   09/11/18 127 lb (57.6 kg)     Body mass index is 25.06 kg/m .    PHYSICAL EXAM:  GENERAL: Pleasant, well-appearing patient in no acute distress.   HEENT: Pupils equal round reactive to light. Sclerae and conjunctivae clear. Oropharynx is clear with moist mucous membranes.   NECK: Supple.  Palpable elongated lymph node left anterior cervical chain, no other lymphadenopathy appreciated.  Is nontender without any overlying skin changes  CARDIOVASCULAR: Heart " regular rate and rhythm without murmur normal S1-S2   LUNGS: Clear to auscultation bilaterally, good air movement throughout   EXTREMITIES Warm and well-perfused without edema. Pedal pulses palpable and symmetric bilaterally   NEURO: Alert and oriented. Grossly nonfocal.   PSYCHIATRIC: Presents on time and well groomed. Normal speech and thought content. Full affect. No abnormal movements or behaviors noted      MEDICATIONS:  Current Outpatient Medications   Medication Sig Dispense Refill     aspirin 325 MG tablet Take 325 mg by mouth daily.       calcium carbonate (OS-DOMONIQUE) 600 mg (1,500 mg) tablet Take 600 mg by mouth every other day.              cholecalciferol, vitamin D3, 5,000 unit Tab Take by mouth. Take 1 tablet daily.       ciprofloxacin HCl (CIPRO) 500 MG tablet 500mg once 1 hr prior to procedure 4 tablet 0     hydrocortisone 0.5 % cream Apply to affected area twice a day. (Patient taking differently: Apply topically 2 (two) times a day as needed. Apply to affected area twice a day.) 30 g 0     levothyroxine (SYNTHROID, LEVOTHROID) 25 MCG tablet Take 1 tablet (25 mcg total) by mouth Daily at 6:00 am. 90 tablet 3     metoprolol succinate (TOPROL-XL) 50 MG 24 hr tablet Take 1 tablet (50 mg total) by mouth at bedtime. 90 tablet 3     mupirocin (BACTROBAN) 2 % ointment Apply topically 3 (three) times a day as needed. 30 g 0     niacin 500 MG tablet Take 500 mg by mouth daily with breakfast.       OMEGA-3/DHA/EPA/FISH OIL (FISH OIL-OMEGA-3 FATTY ACIDS) 300-1,000 mg capsule Take 2,000 mg by mouth daily.        simvastatin (ZOCOR) 20 MG tablet TAKE 1 TABLET DAILY AT BEDTIME 90 tablet 3     sodium fluoride-pot nitrate (PREVIDENT 5000 ENAMEL PROTECT) 1.1-5 % Pste Apply to teeth.       traMADol (ULTRAM) 50 mg tablet Take 1 tablet (50 mg total) by mouth every 8 (eight) hours as needed for pain. 69 tablet 0     triamcinolone (KENALOG) 0.1 % cream Apply topically 2 (two) times a day. Apply ointment to affected areas  twice daily until rash is gone. 45 g 0     VIT C/E/ZN/COPPR/LUTEIN/ZEAXAN (PRESERVISION AREDS 2 ORAL) Take 1 tablet by mouth 2 (two) times a day.       ciprofloxacin HCl (CIPRO) 500 MG tablet One tablet twice daily for up to 3 days for traveler's diarrhea 6 tablet 0     Current Facility-Administered Medications   Medication Dose Route Frequency Provider Last Rate Last Dose     denosumab 60 mg (PROLIA 60 mg/ml)  60 mg Subcutaneous Q6 Months Deisy Mckeon NP   60 mg at 09/18/19 3297

## 2021-06-02 NOTE — TELEPHONE ENCOUNTER
Patient walked into clinic requesting ultrasound results.  notified her that results are not final yet ultrasound was just completed at noon today, the radiologist has not reviewed them. She is very anxious about results and would like ASAP.

## 2021-06-02 NOTE — TELEPHONE ENCOUNTER
----- Message from Eduardo Trent MD sent at 10/16/2019  4:02 PM CDT -----  Please inform patient that U/S shows normal appearing lymphnode with no other concerns.  No follow up is needed at this time.

## 2021-06-02 NOTE — TELEPHONE ENCOUNTER
Patient stopped in clinic to get results. Relayed message below from . Patient states understanding and is relieved.

## 2021-06-03 VITALS — SYSTOLIC BLOOD PRESSURE: 104 MMHG | HEART RATE: 80 BPM | DIASTOLIC BLOOD PRESSURE: 64 MMHG

## 2021-06-03 VITALS
HEIGHT: 60 IN | DIASTOLIC BLOOD PRESSURE: 58 MMHG | OXYGEN SATURATION: 98 % | BODY MASS INDEX: 25.4 KG/M2 | TEMPERATURE: 95.7 F | HEART RATE: 63 BPM | SYSTOLIC BLOOD PRESSURE: 113 MMHG | WEIGHT: 129.4 LBS

## 2021-06-03 VITALS — WEIGHT: 127 LBS | BODY MASS INDEX: 24.94 KG/M2 | HEIGHT: 60 IN

## 2021-06-04 VITALS
SYSTOLIC BLOOD PRESSURE: 120 MMHG | RESPIRATION RATE: 16 BRPM | BODY MASS INDEX: 23.98 KG/M2 | DIASTOLIC BLOOD PRESSURE: 73 MMHG | OXYGEN SATURATION: 96 % | WEIGHT: 123.8 LBS | HEART RATE: 62 BPM

## 2021-06-05 VITALS
SYSTOLIC BLOOD PRESSURE: 104 MMHG | HEART RATE: 64 BPM | DIASTOLIC BLOOD PRESSURE: 76 MMHG | BODY MASS INDEX: 24.15 KG/M2 | WEIGHT: 123 LBS | HEIGHT: 60 IN | RESPIRATION RATE: 12 BRPM

## 2021-06-05 VITALS
BODY MASS INDEX: 24.23 KG/M2 | WEIGHT: 123.4 LBS | SYSTOLIC BLOOD PRESSURE: 124 MMHG | HEIGHT: 60 IN | HEART RATE: 76 BPM | DIASTOLIC BLOOD PRESSURE: 76 MMHG

## 2021-06-05 VITALS
WEIGHT: 121.6 LBS | SYSTOLIC BLOOD PRESSURE: 126 MMHG | DIASTOLIC BLOOD PRESSURE: 72 MMHG | HEART RATE: 75 BPM | HEIGHT: 60 IN | BODY MASS INDEX: 23.87 KG/M2 | RESPIRATION RATE: 18 BRPM

## 2021-06-05 VITALS — HEIGHT: 60 IN | BODY MASS INDEX: 26.19 KG/M2 | WEIGHT: 133.38 LBS

## 2021-06-05 VITALS
HEIGHT: 60 IN | SYSTOLIC BLOOD PRESSURE: 118 MMHG | HEART RATE: 66 BPM | WEIGHT: 121.8 LBS | OXYGEN SATURATION: 99 % | DIASTOLIC BLOOD PRESSURE: 74 MMHG | BODY MASS INDEX: 23.91 KG/M2

## 2021-06-06 NOTE — TELEPHONE ENCOUNTER
Patient just had a tooth extracted today and is scheduled for her prolia injection on 03.24.2020.   Should she still keep this appointment?     Maria D @ [phone]

## 2021-06-06 NOTE — TELEPHONE ENCOUNTER
Patient had a tooth pulled today and wants to know if she can still get the Prolia injection on 4/8.

## 2021-06-06 NOTE — TELEPHONE ENCOUNTER
I called the pt and recommend she wait to have the injection for 21 mo to ensure she is healing from the extraction. Pt understands and is rescheduled.

## 2021-06-09 NOTE — TELEPHONE ENCOUNTER
Patient Returning Call  Reason for call:  Patient returning call   Information relayed to patient:  Message below  Patient has additional questions:  No  If YES, what are your questions/concerns:  n/a  Okay to leave a detailed message?: No call back needed

## 2021-06-09 NOTE — PROGRESS NOTES
Assessment:     1. Syncope  Electrocardiogram Perform and Read    HM1(CBC and Differential)    Comprehensive Metabolic Panel    HM1 (CBC with Diff)   2. Hyperlipidemia  simvastatin (ZOCOR) 20 MG tablet   3. SVT (supraventricular tachycardia) (H)  metoprolol succinate (TOPROL-XL) 50 MG 24 hr tablet   4. Abnormal Blood Chemistry  levothyroxine (SYNTHROID, LEVOTHROID) 25 MCG tablet   5. Osteoporosis Senile  traMADoL (ULTRAM) 50 mg tablet   6. Medication management  Drug Abuse 1+, Urine   7. Cubital tunnel syndrome on right     8. Hypothyroidism, unspecified type       Please see plan of care as below.      Greater than 40 minutes was spent today in interview and examination  with more than 50% of that time in counseling and coordination of care especially regarding SVTs and need for possible further work-up, also for tramadol being on the beers criteria.  Discussion on likely cubital tunnel syndrome, however if symptoms persist then further evaluation is needed.       Plan:      The diagnosis was discussed with the patient and evaluation and treatment plans outlined.  See orders for lab and imaging studies.  Further follow-up plans will be based on outcome of lab/imaging studies; see orders.  Follow up: Return in about 6 months (around 12/29/2020) for Annual physical.     I have seen her today for scab on your nose that has healed.  Continue to monitor it.  If it reappears, do let me know and I will send you to dermatology.    For possible presyncope/syncope which you described as this flash, we will do an EKG.  As it has happened only one time, you can monitor but if it reoccurs then you need to see the cardiologist again.  Do let me know and we will try to arrange an appointment.    I have noted that you had low hemoglobin in 2018, we will recheck it today.    Tramadol is being refilled, do use it judiciously as it falls under the beers criteria that means medication that are high risk for regularly.  It does  "increase her risk of fall.  We will need to do a drug urine screen and a controlled substance protocol.    The numbness and tingling of right arm, looks like ulnar neuropathy.  Here is something to read about it.  If symptoms persist then we will need further evaluation.      Subjective:      Maria D Moore is a  female who presents for evaluation of   Chief Complaint   Patient presents with     Medication Management     Medication check and refill request     Nose Problem     Would like to evaluate scaly nose- seems better, unsure if it was sunburn     Skin Problem     Would like to have back checked for keratosis spots     Arm Problem     States occasionally gets\"tingling\" in RT arm- lasting about 5 minutes then goes away       Patient is here for multiple medical issues as above.  She does need a refill for tramadol that she is using sparingly.  A prescription of 69 pills would last her for about a year.  She uses it when after a long walk her low back/hip start hurting.  She uses it very judiciously.  Regarding her nose problem she had a scaliness on the tip of her nose that has now resolved.  She is unsure if it is of a sunburn.  She would like me to take a look at her skin lesion.  In the past she has had keratosis in the back that was removed and she wants to check out if it has happened again.  Regarding her arm problem, she gets tingling in her right that last for about 5 minutes then goes away.  She has notices that she is in her car sleeping.  This is intermittent.  Most of the numbness and tingling is on the right hand and on the radial side.  She has had syncope/presyncope in the past and has had ablation done.  She follows with cardiology annually.  About 3 weeks ago she has this episode which she describes as a flash.  She did not pass out or fall down.  She has not had another episode since then.  She denies any changes in vision.  No nausea vomiting or diarrhea.  She describes her energy levels " is normal.      The following portions of the patient's history were reviewed and updated as appropriate: allergies, current medications, past family history, past medical history, past social history, past surgical history and problem list.    Review of Systems  A 12 point comprehensive review of systems was negative except as noted.       Objective:   /73 (Patient Site: Left Arm, Patient Position: Sitting, Cuff Size: Adult Regular)   Pulse 62   Resp 16   Wt 123 lb 12.8 oz (56.2 kg)   SpO2 96%   BMI 23.98 kg/m      General Appearance: healthy, alert, oriented and no distress  Neck:  supple, no adenopathy, thyroid normal  Heart: Normal heart sounds, S1 and S2, No murmurs.  No carotid bruit.  Lungs: Normal breath sounds, Clear to auscultation bilateral  Skin exam: The lesion on the nose is not present anymore.  Examination of the back shows no suberic keratosis at this point.  Scars from previous removals are noted.  MSK: Examination of the right extremity is normal.  Sensation is intact.  Strength is normal.  Neurological exam: gait normal, alert and oriented X 3  Hem/Lymph/Immuno exam: negative findings:  no cervical nodes, no supraclavicular nodes,

## 2021-06-09 NOTE — TELEPHONE ENCOUNTER
Would recommend if patient is having skin changes to not wait for 3 months to be seen -but rather sooner, possibly virtually with one of the providers. And if she passed out as well a couple of weeks ago she should not wait for 3 months.

## 2021-06-09 NOTE — TELEPHONE ENCOUNTER
Who is calling:  Maria D  Reason for Call:  Patient called stated she prefers to be seeing in person on Sept 15th 1st AM appointment for: a scaly nose x1 month and passing out from a few weeks ago and medication check  Date of last appointment with primary care: 1 year  Okay to leave a detailed message: Yes

## 2021-06-09 NOTE — TELEPHONE ENCOUNTER
----- Message from Flex Bain MD sent at 6/29/2020 10:32 AM CDT -----  Please inform patient that her hemoglobin and white count tests are normal.  Other tests are pending and I will inform her when results are available.  Flex Bain MD  6/29/2020

## 2021-06-09 NOTE — TELEPHONE ENCOUNTER
Spoke to pt and let her know that we are able to add on requested labs and will call her with results when they come back

## 2021-06-09 NOTE — TELEPHONE ENCOUNTER
Yes, I will take her as a PCP.  I have already seen her, there is no need for  establish care appointment    Flex Bain MD  7/2/2020

## 2021-06-09 NOTE — TELEPHONE ENCOUNTER
Labs have been ordered for the patient.    However, there was checked in September 2019 and were normal.    Please inform lab and inquire if they can  be run as add-on.    Flex Bain MD  7/1/2020

## 2021-06-09 NOTE — TELEPHONE ENCOUNTER
Patient Returning Call  Reason for call:  I am calling for my lab results.  Information relayed to patient:    Please inform patient vitamin D and TSH are normal.  Please mail a letter.   Flex Bain MD  7/2/2020   Patient was given this information as instructed.  Patient has additional questions:  Yes  If YES, what are your questions/concerns:  I really loved Dr. Bain and I would like to make her my primary doctor. Can she be my PCP?  Okay to leave a detailed message?: Yes

## 2021-06-09 NOTE — TELEPHONE ENCOUNTER
Orders being requested: Add on labs of TSH and Vitamin D to the lab draw from 6/29/2020.  Reason service is needed/diagnosis: Screening thyroid level and Vitamin D level.  When are orders needed by: asap per patient.  Where to send Orders: [facility]  Okay to leave detailed message?  Yes  Patient is requesting a call back when this is ordered.

## 2021-06-09 NOTE — TELEPHONE ENCOUNTER
----- Message from Flex Bain MD sent at 6/29/2020  4:45 PM CDT -----  Please inform patient that EKG and the rest of the lab appear to be fairly normal  .  Please mail a copy of the results.    Flex Bain MD  6/29/2020

## 2021-06-09 NOTE — PROGRESS NOTES

## 2021-06-09 NOTE — TELEPHONE ENCOUNTER
Called and spoke to patient, relayed Dr. Trent's message to patient. Pt states understanding. Pt OK to schedule, she wanted to schedule with Dr. ENCARNACION. I scheduled patient with Dr. ENCARNACION for Monday 06/29 at 9 am.

## 2021-06-11 ENCOUNTER — COMMUNICATION - HEALTHEAST (OUTPATIENT)
Dept: ADMINISTRATIVE | Facility: CLINIC | Age: 86
End: 2021-06-11

## 2021-06-11 DIAGNOSIS — M81.0 SENILE OSTEOPOROSIS: ICD-10-CM

## 2021-06-11 NOTE — PROGRESS NOTES
"Maria D Moore is a 85 y.o. female who is being evaluated via a billable telephone visit.      The patient has been notified of following:     \"This telephone visit will be conducted via a call between you and your physician/provider. We have found that certain health care needs can be provided without the need for a physical exam.  This service lets us provide the care you need with a short phone conversation.  If a prescription is necessary we can send it directly to your pharmacy.  If lab work is needed we can place an order for that and you can then stop by our lab to have the test done at a later time.    Telephone visits are billed at different rates depending on your insurance coverage. During this emergency period, for some insurers they may be billed the same as an in-person visit.  Please reach out to your insurance provider with any questions.    If during the course of the call the physician/provider feels a telephone visit is not appropriate, you will not be charged for this service.\"    Patient has given verbal consent to a Telephone visit? Yes    What phone number would you like to be contacted at? [phone]    Patient would like to receive their AVS by AVS Preference: Enoch.    Additional provider notes:       Reason for visit      1. Osteoporosis Senile        History     Maria D Moore is a very pleasant 85 y.o. old female who presents for follow up.   SUMMARY:  As you know, she took a fall when she became syncopal with atrial fibrillation on August 13th, 2010. Initially plain radiographs were negative but with persistent pain. A subsequent CT scan on September 26th, 2010, did show the sacral insufficiency fractures both sacral wings extending into the S2 sacral body and nondisplaced fracture of the right pubic bone. MRI in September 2010 confirmed these findings. A followup CT done on March 9 2011, indicates again a broad based bilateral sacral insufficiency fractures and ununited " fracture of the right pubic bone. She is here for further recommendations. I should note that at the time she was hospitalized with her fractures, she was told that her vitamin D level was low and she was treated with 89586 International Units of vitamin D weekly for three months. Subsequently, her level chuck just above 50 ng/mL has had serious  pelvic fractures while on bisphosphonates for at least six years. A 78-year-old woman with severe osteoporosis who has completed 2 years of teriparatide.  We discussed options for following with antiresorptive agent and I do think that denosumab would be preferable for her and she was in agreement with this plan.    TODAY:    Maria D is contacted today in f/u for Osteoporosis. She has been receiving Prolia injections without difficulties. She was to travel to [place] this year, but because of COVID, was unable to do so. Her most recent Dexa Scan of this month showed: Since the previous bone density dated  August 31, 2018, there has been a   +5.7 % change in the bone density of the spine.  Additionally there has been no statistically significant % change in the hips bilaterally and left distal radius.  An increase in and/or stability is considered a positive response to treatment.  Her Calcium level has dropped since we cut back her supplements. It is now 8.9. Current Vit D level is 59.1.  She is very active and is currently caring for her 60 year old son who just had a quad CABG.    Risk Factors     The following high- risk conditions have been ruled out: celiac disease, eating disorders, gastric bypass, hyperparathyroidism, inflammatory bowel disease, hyperthyroidism, rheumatoid arthritis, lupus, chronic kidney disease.     Maria D Moore has the following risk factors: Age, Female gender,  and Low BMI     She is not on high risk medications such as glucocorticoids, anti-coagulants, anti-convulsants, chemotherapy or levothyroxine.    Patient deniesHysterectomy,  Oophrectomy, Breast cancer and Family history of breast cancer.      Past Medical History     Patient Active Problem List   Diagnosis     Fatigue     Temperature Intolerance To Cold   (Consistent)     Abnormal Blood Chemistry     Osteoarthritis Of The Ankle/Foot (Multiple Joints)     Mixed hyperlipidemia     Furuncle     Generalized Osteoarthritis Of Multiple Sites     Edema     Osteoporosis Senile     Vaginal Discharge (Symptom)     Back Pain     Automatic Atrial Tachycardia     Lightheadedness     SVT (supraventricular tachycardia) (H)     Hypotension, unspecified hypotension type       Family History       family history includes Stroke in her father.    Social History      reports that she has never smoked. She has never used smokeless tobacco. She reports that she does not drink alcohol or use drugs.      Review of Systems     Patient denies current pain, limited mobility, fractures.   Remainder per HPI.          Assessment     1. Osteoporosis Senile        Plan     Maria D is scheduled for her next Prolia injection in December. She is going to take Calcium daily for a week and then every other day for a week, and continue alternating. She will f/u with me in 1 year, sooner with problems or concerns.           Current Medications     Outpatient Medications Prior to Visit   Medication Sig Dispense Refill     aspirin 325 MG tablet Take 325 mg by mouth daily.       calcium carbonate (OS-DOMONIQUE) 600 mg (1,500 mg) tablet Take 600 mg by mouth every other day.              cholecalciferol, vitamin D3, 5,000 unit Tab Take by mouth. Take 1 tablet daily.       ciprofloxacin HCl (CIPRO) 500 MG tablet One tablet twice daily for up to 3 days for traveler's diarrhea 6 tablet 0     hydrocortisone 0.5 % cream Apply to affected area twice a day. (Patient taking differently: Apply topically 2 (two) times a day as needed. Apply to affected area twice a day.) 30 g 0     levothyroxine (SYNTHROID, LEVOTHROID) 25 MCG tablet Take 1 tablet  (25 mcg total) by mouth Daily at 6:00 am. 90 tablet 3     metoprolol succinate (TOPROL-XL) 50 MG 24 hr tablet Take 1 tablet (50 mg total) by mouth at bedtime. 90 tablet 3     mupirocin (BACTROBAN) 2 % ointment Apply topically 3 (three) times a day as needed. 30 g 0     niacin 500 MG tablet Take 500 mg by mouth daily with breakfast.       OMEGA-3/DHA/EPA/FISH OIL (FISH OIL-OMEGA-3 FATTY ACIDS) 300-1,000 mg capsule Take 2,000 mg by mouth daily.        simvastatin (ZOCOR) 20 MG tablet TAKE 1 TABLET DAILY AT BEDTIME 90 tablet 3     sodium fluoride-pot nitrate (PREVIDENT 5000 ENAMEL PROTECT) 1.1-5 % Pste Apply to teeth.       traMADoL (ULTRAM) 50 mg tablet Take 1 tablet (50 mg total) by mouth at bedtime as needed for severe pain (7-10). 69 tablet 0     triamcinolone (KENALOG) 0.1 % cream Apply topically 2 (two) times a day. Apply ointment to affected areas twice daily until rash is gone. 45 g 0     VIT C/E/ZN/COPPR/LUTEIN/ZEAXAN (PRESERVISION AREDS 2 ORAL) Take 1 tablet by mouth 2 (two) times a day.       Facility-Administered Medications Prior to Visit   Medication Dose Route Frequency Provider Last Rate Last Dose     denosumab 60 mg (PROLIA 60 mg/ml)  60 mg Subcutaneous Q6 Months Deisy Mckeon, NP   60 mg at 09/18/19 0832     denosumab 60 mg (PROLIA 60 mg/ml)  60 mg Subcutaneous Q6 Months Deisy Mckeon NP   60 mg at 06/23/20 1000         Lab Results     TSH   Date Value Ref Range Status   06/29/2020 1.24 0.30 - 5.00 uIU/mL Final     PTH   Date Value Ref Range Status   07/26/2016 34 10 - 86 pg/mL Final     Calcium   Date Value Ref Range Status   06/29/2020 8.9 8.5 - 10.5 mg/dL Final     Phosphorus   Date Value Ref Range Status   07/26/2016 3.8 2.5 - 4.5 mg/dL Final           Imaging Results   Last DEXA scan:  Results for orders placed in visit on 06/09/20   DXA Bone Density Scan    Narrative 9/14/2020      RE: Maria D Handberg  YOB: 1934        Dear Deisy Mckeon,    Patient Profile:  85 y.o.  female, postmenopausal, is here for the follow up bone density   test.   History of fractures - Foot. Family history of osteoporosis - Unknown.    Family history of hip fracture: None. Smoking history - No. Osteoporosis   treatment past -  Yes;  HRT, Bisphosphonates and Forteo. Osteoporosis   treatment current - Yes;  Prolia.  Chronic medical problems - Hysterectomy   and Oophorectomy. High risk medications -  None.      Assessment:    1. The spine bone density L1-L2 with T-score -0.2.  2. Femoral bone densities show left total hip T- score -1.6 and right   femoral neck T-score -1.5.  3. Trabecular bone score indicates good trabecular bone architecture.  4.  The left distal radius T score -3.2.    85 y.o. female with OSTEOPOROSIS and HIGH fracture risk, adjusted for the   TBS, with major osteoporotic fracture risk 11.5 % and hip fracture risk   3.7 %.     Since the previous bone density dated  August 31, 2018, there has been a     +5.7 % change in the bone density of the spine.  Additionally there has   been no statistically significant % change in the hips bilaterally and   left distal radius.  An increase in and/or stability is considered a   positive response to treatment.    Recommendations:  A continuation of the current treatment is recommended with follow up bone   density scan in 2 years.      Bone densitometry was performed on your patient using our JoopLoop   densitometer. The results are summarized and a copy of the actual scans   are included for your review. In conformity with the International Society   of Clinical Densitometry's most recent position statement for DXA   interpretation (2015), the diagnosis will be made on the lowest measured   T-score of the lumbar spine, femoral neck, total proximal femur or 33%   radius. Note the change in terminology for diagnostic classification from   OSTEOPENIA to LOW BONE MASS. All trending for sequential exams will be   done using multiple vertebrae or the  total proximal femur. Fracture risk   is based on the WHO Fracture Risk Assessment Tool (FRAX). If additional   information is needed or if you would like to discuss the results, please   do not hesitate to call me.       Thank you for referring this patient to [facility].   We are happy to be of service in support of you and your practice. If you   have any questions or suggestions to improve our service, please call me   at [phone].     Sincerely,     Shaista Caruso M.D. C.C.D.  Osteoporosis Services, [facility]         Phone call duration: 18 minutes    Adan MARKS-C

## 2021-06-11 NOTE — PROGRESS NOTES
Assessment & Plan:  1. Hordeolum externum of right lower eyelid  Patient with a stye of her lower eyelid.  No evidence of secondary infection.  Recommend warm compresses to help resolve.  If more erythema develops could consider antibx ointment such as polymyxin as she states she tolerated      No orders of the defined types were placed in this encounter.    Medications Discontinued During This Encounter   Medication Reason     traMADol (ULTRAM) 50 mg tablet Duplicate order     vitamin A-vitamin C-vit E-min (OCUVITE) Tab tablet Alternate therapy       No Follow-up on file.        Chief Complaint:   Chief Complaint   Patient presents with     Blepharitis     small white bump on bottom eye lid that started over the weeked; per pt she has tried placing hot rags on it and didnt help       History of Present Illness:  Maria D is a 82 y.o. female presenting to the clinic today for a bump on her eyelid. She is a patient of Dr. Trent. She noticed it developing over the weekend, a few days ago. She has been using a warm towel over the eye. She notes that it has become larger and more painful this morning than it has been so far. She notes it is painful and itches, with tenderness below her eye. She has not used new makeup or shampoo. She has not washed the area with soap. The redness has come to a head today.    Review of Systems:  She has tolerated polymixin eyedrops and ointment in the past. All other systems are negative.     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     Tobacco Use:  History   Smoking Status     Never Smoker   Smokeless Tobacco     Not on file       Vitals:  Vitals:    06/19/17 1544   BP: 114/60   Patient Site: Left Arm   Patient Position: Sitting   Cuff Size: Adult Regular   Pulse: (!) 56   Resp: 16   Temp: 98.4  F (36.9  C)   TempSrc: Oral   Weight: 128 lb 14.4 oz (58.5 kg)     Wt Readings from Last 3 Encounters:   06/19/17 128 lb 14.4 oz (58.5 kg)    12/01/16 130 lb (59 kg)   09/07/16 129 lb (58.5 kg)     Body mass index is 24.36 kg/(m^2).      Physical Exam:  Constitutional:  Reveals an alert, cooperative, pleasant female in no acute distress.  Vitals:  Per nursing notes.  Eyes: Right eye lower eyelid small papule that appears ready to rupture.  Minimal erythema at the base.  No conjunctival injection.  No swelling around the eye.  Neurologic:  No gross focal deficits.    Psychiatric:  Mood appropriate, memory intact.     Data Reviewed:  Additional history summarized (from old records or history from someone other than the patient or another healthcare provider) (2 TOTAL): Reviewed note from 4/30/12 regarding cataract surgery.      Decision to obtain extra information (old records requested or history from another person or accessing Care Everywhere) (1 TOTAL): None.     Radiology tests summarized or ordered (XRAY/CT/MRI/DXA) (1 TOTAL): None.    Labs reviewed or ordered (1 TOTAL): None.    Medicine tests summarized or ordered (EKG/ECHO) (1 TOTAL): None.    Independent review of EKG or X-Ray (2 EACH): None.       The visit lasted a total of 16 minutes face to face with the patient. Over 50% of the time was spent counseling and educating the patient about symptom management.    I, Eugenia Muñoz, am scribing for and in the presence of, Dr. Andrade.    I, Dr. Andrade, personally performed the services described in this documentation, as scribed by Eugenia Muñoz in my presence, and it is both accurate and complete.    Medications:  Current Outpatient Prescriptions   Medication Sig Dispense Refill     aspirin 325 MG tablet Take 325 mg by mouth daily.       calcium carbonate (OS-DOMONIQUE) 600 mg (1,500 mg) tablet Take 600 mg by mouth daily.       cholecalciferol, vitamin D3, 5,000 unit Tab Take by mouth. Take 1 tablet daily.       ciprofloxacin HCl (CIPRO) 500 MG tablet Take 2 tabs 1-2 hours prior to dental procedure 2 tablet 3     denosumab 60 mg/mL Syrg Inject 60 mg  under the skin once. 6/9/2014, Inject subcutaneously 60mg/1ml every 6 months. (Prolia)       metoprolol succinate (TOPROL-XL) 100 MG 24 hr tablet Take 1 tablet (100 mg total) by mouth daily. 90 tablet 3     niacin 500 MG tablet Take 500 mg by mouth daily with breakfast.       OMEGA-3/DHA/EPA/FISH OIL (FISH OIL-OMEGA-3 FATTY ACIDS) 300-1,000 mg capsule 2,000 mg.        simvastatin (ZOCOR) 20 MG tablet Take 1 tablet (20 mg total) by mouth bedtime. Take one table by mouth every day. 90 tablet 3     sodium fluoride-pot nitrate (PREVIDENT 5000 ENAMEL PROTECT) 1.1-5 % Pste Apply to teeth.       traMADol 50 mg TbDL Take 1 tablet by mouth 3 (three) times a day as needed. 90 tablet 0     triamcinolone (KENALOG) 0.1 % cream Apply topically 2 (two) times a day. Apply ointment to affected areas twice daily until rash is gone. 60 g 0     VIT C/E/ZN/COPPR/LUTEIN/ZEAXAN (PRESERVISION AREDS 2 ORAL) Take 1 tablet by mouth 2 (two) times a day.       No current facility-administered medications for this visit.        Total Data Points: 2

## 2021-06-11 NOTE — TELEPHONE ENCOUNTER
Wellness Screening Tool  Symptom Screening:  Do you have one of the following NEW symptoms:    Fever (subjective or >100.0)?  No    A new cough?  No    Shortness of breath?  No     Chills? No     New loss of taste or smell? No     Generalized body aches? No     New persistent headache? No     New sore throat? No     Nausea, vomiting, or diarrhea?  No    Within the past 2 weeks, have you been exposed to someone with a known positive illness below:    COVID-19 (known or suspected)?  No    Chicken pox?  No    Mealses?  No    Pertussis?  No    Patient notified of visitor policy- They may have one person accompany them to their appointment, but they will need to wear a mask and will be screened upon arrival for symptoms: Yes  Pt informed to wear a mask: Yes  Pt notified if they develop any symptoms listed above, prior to their appointment, they are to call the clinic directly at [phone] for further instructions.  Yes  Patient's appointment status: Patient will be seen in clinic as scheduled on 9/16

## 2021-06-12 NOTE — TELEPHONE ENCOUNTER
Refill Approved    Rx renewed per Medication Renewal Policy. Medication was last renewed on 6/29/20, last OV 6/29/20.    Ronel Bravo, [facility] Triage/Med Refill 11/7/2020     Requested Prescriptions   Pending Prescriptions Disp Refills     levothyroxine (SYNTHROID, LEVOTHROID) 25 MCG tablet [Pharmacy Med Name: L-THYROXINE (SYNTHROID) TABS 25MCG] 90 tablet 3     Sig: TAKE 1 TABLET DAILY AT 6:00 A.M.       Thyroid Hormones Protocol Passed - 11/7/2020  9:49 AM        Passed - Provider visit in past 12 months or next 3 months     Last office visit with prescriber/PCP: 10/15/2019 Catalina Mock MD OR same dept: 6/29/2020 Flex Bain MD OR same specialty: 6/29/2020 Flex Bain MD  Last physical: Visit date not found Last MTM visit: Visit date not found   Next visit within 3 mo: Visit date not found  Next physical within 3 mo: Visit date not found  Prescriber OR PCP: Catalina Mock MD  Last diagnosis associated with med order: 1. Abnormal Blood Chemistry  - levothyroxine (SYNTHROID, LEVOTHROID) 25 MCG tablet [Pharmacy Med Name: L-THYROXINE (SYNTHROID) TABS 25MCG]; TAKE 1 TABLET DAILY AT 6:00 A.M.  Dispense: 90 tablet; Refill: 3    If protocol passes may refill for 12 months if within 3 months of last provider visit (or a total of 15 months).             Passed - TSH on file in past 12 months for patient age 12 & older     TSH   Date Value Ref Range Status   06/29/2020 1.24 0.30 - 5.00 uIU/mL Final

## 2021-06-12 NOTE — TELEPHONE ENCOUNTER
FYI - Status Update  Who is Calling: Patient  Update: Patient is requesting more refills on this medication since she does not need lab work until June 2021.  Okay to leave a detailed message?:  No return call needed

## 2021-06-12 NOTE — PROGRESS NOTES
[facility]  ENDOCRINOLOGY    Osteoporosis Follow Up 8/29/2017    Maria D Moore, 11/24/1934, [id number]          Reason for visit      1. Osteoporosis Senile    2. Eczema    3. Contact dermatitis        History     Maria D Moore is a very pleasant 82 y.o. old female who presents for follow up.   SUMMARY:  As you know, she took a fall when she became syncopal with atrial fibrillation on August 13th, 2010. Initially plain radiographs were negative but with persistent pain. A subsequent CT scan on September 26th, 2010, did show the sacral insufficiency fractures both sacral wings extending into the S2 sacral body and nondisplaced fracture of the right pubic bone. MRI in September 2010 confirmed these findings. A followup CT done on March 9 2011, indicates again a broad based bilateral sacral insufficiency fractures and ununited fracture of the right pubic bone. She is here for further recommendations. I should note that at the time she was hospitalized with  her fractures, she was told that her vitamin D level was low and she was treated with 76058 International Units of vitamin D weekly for three months. Subsequently, her level chuck just above 50 ng/mL has had serious  pelvic fractures while on bisphosphonates for at least six years. A 78-year-old woman with severe osteoporosis who has completed 2 years of teriparatide.  We discussed options for following with antiresorptive agent and I do think that denosumab would be preferable for her and she was in agreement with this plan.    TODAY:  Maria D is here today as a ANGELINA from Dr Loomis for Osteoporosis.  She has a hx of seeing Dr. Jones, who provides the above.  She has now been on Prolia injections every 6 months since 2013.  She has had no problems with them.  She is currently taking both Calcium and Vit D in supplementation, and her levels are 9.5 and 69.2, respectively.  She does some walking and is active in traveling with her daughters.  They are  "headed for [place] next year.  Her last Dexa Scan showed stability in her hips and decline in her wrists.      Risk Factors     The following high- risk conditions have been ruled out: celiac disease, eating disorders, gastric bypass, hyperparathyroidism, inflammatory bowel disease, hyperthyroidism, rheumatoid arthritis, lupus, chronic kidney disease.    Maria D Moore has the following risk factors: Age, Female gender,  and Low BMI    She is not on high risk medications such as glucocorticoids, anti-coagulants, anti-convulsants, chemotherapy or levothyroxine.    Patient deniesHysterectomy, Oophrectomy, Breast cancer and Family history of breast cancer.         Past Medical History     Patient Active Problem List   Diagnosis     Fatigue     Temperature Intolerance To Cold   (Consistent)     Abnormal Blood Chemistry     Osteoarthritis Of The Ankle/Foot (Multiple Joints)     Mixed hyperlipidemia     Furuncle     Generalized Osteoarthritis Of Multiple Sites     Edema     Osteoporosis Senile     Vaginal Discharge (Symptom)     Back Pain     Automatic Atrial Tachycardia     Lightheadedness       Family History       family history is not on file.    Social History      reports that she has never smoked. She does not have any smokeless tobacco history on file.      Review of Systems     Patient denies current pain, limited mobility, fractures.   Remainder per HPI.      Vital Signs     /68 (Patient Site: Left Arm, Patient Position: Sitting, Cuff Size: Adult Regular)  Pulse 64  Ht 5' 1\" (1.549 m)  Wt 131 lb (59.4 kg)  BMI 24.75 kg/m2    Physical Exam     GENERAL:  Normal, NIRD  EYES:  Pupils equal, round and reactive to light; no proptosis, lid lag or  periorbital edema.  THYROID:  Thyroid is normal.  No tenderness or bruit  NECK: No lymph nodes  MUSCULOSKELETAL: No joint abnormalities, FROM in all four extremities. No kyphosis. Muscle strength grossly normal without evidence of wasting.  HEART:  Regular " rate and rhythm without murmur.  LUNGS:  Clear to auscultation.  ABDOMEN:Soft, non-tender, no masses or organomegaly  NEURO:  Patella Reflexes were normal.No tremors  SKIN:  No acanthosis nigricans or vitiligo        Assessment     1. Osteoporosis Senile    2. Eczema    3. Contact dermatitis        Plan     She will continue with the Prolia injections at least until after her next Dexa Scan next year and we will discuss a drug Holiday then.  She will get an injection today.  F/u for injection in 6 months.     Maria D asked me to look at an area at the top of her gluteal fold to see what was going on there.  It looks like either a fungal patch or eczema.  It is about the size of a quarter on the R, just medial to the top of the fold.  Instructed to put a topical steroid on it.      Total visit minutes:25  Time spent counseling and coordination of care:23    Deisy Mckeon   Endocrinology  8/29/2017  11:18 AM      Current Medications     Outpatient Medications Prior to Visit   Medication Sig Dispense Refill     aspirin 325 MG tablet Take 325 mg by mouth daily.       calcium carbonate (OS-DOMONIQUE) 600 mg (1,500 mg) tablet Take 600 mg by mouth daily.       cholecalciferol, vitamin D3, 5,000 unit Tab Take by mouth. Take 1 tablet daily.       ciprofloxacin HCl (CIPRO) 500 MG tablet Take 2 tabs 1-2 hours prior to dental procedure 2 tablet 3     denosumab 60 mg/mL Syrg Inject 60 mg under the skin once. 6/9/2014, Inject subcutaneously 60mg/1ml every 6 months. (Prolia)       metoprolol succinate (TOPROL-XL) 100 MG 24 hr tablet TAKE 1 TABLET DAILY 90 tablet 0     niacin 500 MG tablet Take 500 mg by mouth daily with breakfast.       OMEGA-3/DHA/EPA/FISH OIL (FISH OIL-OMEGA-3 FATTY ACIDS) 300-1,000 mg capsule 2,000 mg.        simvastatin (ZOCOR) 20 MG tablet TAKE 1 TABLET EVERY DAY AT BEDTIME 90 tablet 0     sodium fluoride-pot nitrate (PREVIDENT 5000 ENAMEL PROTECT) 1.1-5 % Pste Apply to teeth.       traMADol (ULTRAM) 50 mg  tablet Take 1 tablet (50 mg total) by mouth every 8 (eight) hours as needed for pain. 90 tablet 0     VIT C/E/ZN/COPPR/LUTEIN/ZEAXAN (PRESERVISION AREDS 2 ORAL) Take 1 tablet by mouth 2 (two) times a day.       traMADol 50 mg TbDL Take 1 tablet by mouth 3 (three) times a day as needed. 90 tablet 0     triamcinolone (KENALOG) 0.1 % cream Apply topically 2 (two) times a day. Apply ointment to affected areas twice daily until rash is gone. 60 g 0     No facility-administered medications prior to visit.          Lab Results     TSH   Date Value Ref Range Status   08/11/2015 2.51 0.30 - 5.05 uIU/mL Final     PTH   Date Value Ref Range Status   07/26/2016 34 10 - 86 pg/mL Final     Calcium   Date Value Ref Range Status   08/21/2017 9.6 8.5 - 10.5 mg/dL Final   08/21/2017 9.5 8.5 - 10.5 mg/dL Final     Phosphorus   Date Value Ref Range Status   07/26/2016 3.8 2.5 - 4.5 mg/dL Final           Imaging Results   Last DEXA scan:  Results for orders placed in visit on 07/26/16   DXA Bone Density Scan    Narrative 7/31/2016      RE: Maria D Moore  YOB: 1934        Dear Dr.Christine Jones,    Patient Profile:  81 y.o. female, postmenopausal, is here for the follow up bone density   test.   History of fractures - None. Family history of osteoporosis - None.    Family history of hip fracture: None. Smoking history - No. Osteoporosis   treatment past -  Yes;  Bisphosphonates and Forteo. Osteoporosis treatment   current - Yes;  Prolia.  Chronic medical problems - Premature menopause.   High risk medications -  None.      Assessment:    1. The spine bone density L1-L4 with T-score 0.2 and significant   artifacts.  2. Femoral bone densities show left total hip T- score -1.9 and right   femoral neck T-score -1.5, stable.  3. Left forearm bone density with T-score -3.8 and significant decline of   22.6% compared to 2006.    81 y.o. female with OSTEOPOROSIS and stable bone density in her hips on   the current  treatment.        Recommendations:  Appropriate calcium and vitamin D supplements and active treatment   recommended with follow up bone density scan in 2 years.      Bone densitometry was performed on your patient using our [employer]   densitometer. The results are summarized and a copy of the actual scans   are included for your review. In conformity with the International Society   of Clinical Densitometry's most recent position statement for DXA   interpretation (2015), the diagnosis will be made on the lowest measured   T-score of the lumbar spine, femoral neck, total proximal femur or 33%   radius. Note the change in terminology for diagnostic classification from   OSTEOPENIA to LOW BONE MASS. All trending for sequential exams will be   done using multiple vertebrae or the total proximal femur. Fracture risk   is based on the WHO Fracture Risk Assessment Tool (FRAX). If additional   information is needed or if you would like to discuss the results, please   do not hesitate to call me.       Thank you for referring this patient to [facility]opor[facility].   We are happy to be of service in support of you and your practice. If you   have any questions or suggestions to improve our service, please call me   at [phone].     Sincerely,     Citlalli Loomis M.D. MARGARITACMACK.  Osteoporosis Services, [facility]

## 2021-06-12 NOTE — TELEPHONE ENCOUNTER
Refill Approved    Rx renewed per Medication Renewal Policy. Medication was last renewed on 11/7/20.    Teagan Mohr, [facility] Triage/Med Refill 11/9/2020     Requested Prescriptions   Pending Prescriptions Disp Refills     levothyroxine (SYNTHROID, LEVOTHROID) 25 MCG tablet 90 tablet 1       Thyroid Hormones Protocol Passed - 11/9/2020  2:02 PM        Passed - Provider visit in past 12 months or next 3 months     Last office visit with prescriber/PCP: 6/29/2020 Flex Bain MD OR same dept: 6/29/2020 Flex Bain MD OR same specialty: 6/29/2020 Flex Bain MD  Last physical: Visit date not found Last MTM visit: Visit date not found   Next visit within 3 mo: Visit date not found  Next physical within 3 mo: Visit date not found  Prescriber OR PCP: Flex Bain MD  Last diagnosis associated with med order: 1. Abnormal Blood Chemistry  - levothyroxine (SYNTHROID, LEVOTHROID) 25 MCG tablet  Dispense: 90 tablet; Refill: 1    If protocol passes may refill for 12 months if within 3 months of last provider visit (or a total of 15 months).             Passed - TSH on file in past 12 months for patient age 12 & older     TSH   Date Value Ref Range Status   06/29/2020 1.24 0.30 - 5.00 uIU/mL Final

## 2021-06-12 NOTE — TELEPHONE ENCOUNTER
"Test Results  Who is calling?:  Patient  Who ordered the test:  Flex Snyder  Type of test: Imaging  Date of test:  10/08/2020  Where was the test performed:  [facility]  What are your questions/concerns?:  Patient would like a call when results are in from mammogram. \" I am very nervous what results might say\".  Okay to leave a detailed message?:  Yes    "

## 2021-06-12 NOTE — TELEPHONE ENCOUNTER
Please inform patient that her mammogram was normal.  The instructions are to pursue routine screening mammogram as recommended.     There are benign appearing calcifications in the left and right breasts (this means nothing that requires further work-up)    We can discuss in future if patient wants to continue getting mammograms.     Flex Bain MD  10/8/2020

## 2021-06-13 NOTE — PROGRESS NOTES
ASSESSMENT/PLAN  1. Mixed hyperlipidemia  Patient continues on simvastatin we will continue this medication check labs today and follow-up based on results  Continue to work on exercise  - Lipid [place] FASTING    2. Hypertension, unspecified type  Blood pressure goal range continue his current medication at this time    3. Gastroesophageal reflux disease without esophagitis  Patient has had some acid reflux at night and the last few months it is very infrequent- last night was quite severe  She is no longer having symptoms at this time  She does not want to start daily medication as she does not think her symptoms without frequent but she will continue to monitor    4. Eczema  Patient has some topical triamcinolone ointment that she uses for eczema flares which clears the area well will continue as needed    5. Generalized Osteoarthritis Of Multiple Sites  Controlled substance agreement filled out  Patient does use tramadol at night for osteoarthritic pain  She uses 1 -50 mg tablet daily        SUBJECTIVE:   Chief Complaint   Patient presents with     Medication Management     refills     Rash     pt has eczema on buttocks, would like it checked     Gastroesophageal Reflux     pt states she had acid reflux last night so bad that she could not sleep and  sob     Maria D Moore 82 y.o. female    Current Outpatient Prescriptions   Medication Sig Dispense Refill     aspirin 325 MG tablet Take 325 mg by mouth daily.       calcium carbonate (OS-DOMONIQUE) 600 mg (1,500 mg) tablet Take 600 mg by mouth daily.       cholecalciferol, vitamin D3, 5,000 unit Tab Take by mouth. Take 1 tablet daily.       ciprofloxacin HCl (CIPRO) 500 MG tablet Take 2 tabs 1-2 hours prior to dental procedure (Patient taking differently: Take 2 tabs 1-2 hours prior to dental procedure) 2 tablet 3     denosumab 60 mg/mL Syrg Inject 60 mg under the skin once. 6/9/2014, Inject subcutaneously 60mg/1ml every 6 months. (Prolia)       metoprolol succinate  (TOPROL-XL) 100 MG 24 hr tablet TAKE 1 TABLET DAILY 90 tablet 0     niacin 500 MG tablet Take 500 mg by mouth daily with breakfast.       OMEGA-3/DHA/EPA/FISH OIL (FISH OIL-OMEGA-3 FATTY ACIDS) 300-1,000 mg capsule 2,000 mg.        simvastatin (ZOCOR) 20 MG tablet TAKE 1 TABLET EVERY DAY AT BEDTIME 90 tablet 0     sodium fluoride-pot nitrate (PREVIDENT 5000 ENAMEL PROTECT) 1.1-5 % Pste Apply to teeth.       traMADol (ULTRAM) 50 mg tablet Take 1 tablet (50 mg total) by mouth every 8 (eight) hours as needed for pain. 90 tablet 0     triamcinolone (KENALOG) 0.1 % cream Apply topically 2 (two) times a day. Apply ointment to affected areas twice daily until rash is gone. 60 g 0     VIT C/E/ZN/COPPR/LUTEIN/ZEAXAN (PRESERVISION AREDS 2 ORAL) Take 1 tablet by mouth 2 (two) times a day.       hydrocortisone 0.5 % cream Apply to affected area twice a day. 30 g 0     Current Facility-Administered Medications   Medication Dose Route Frequency Provider Last Rate Last Dose     denosumab 60 mg (PROLIA 60 mg/ml)  60 mg Subcutaneous Q6 Months Deisy Mckeon NP   60 mg at 08/29/17 1058     Allergies: Cephalexin; Erythromycin base; Penicillins; Sulfa (sulfonamide antibiotics); and Vancomycin   No LMP recorded. Patient has had a hysterectomy.    HPI:   82-year-old female here for medication check.  She takes blood pressure medication in the form of metoprolol-she has no side effects the medication is controlling her symptoms well.  She takes simvastatin for her hyperlipidemia will be checking labs today and following up based on results.  She does have controlled substance agreement that we update today-she uses tramadol 50 mg at night for her osteoarthritic pain.  She has infrequently in the past been having episodes with acid reflux-she does not feel to regular enough to start or warranted ADD medication.  Discussed with her the risk factors of it does become more frequent to start with a PPI and she is in agreement.  She does  periodically use triamcinolone for eczema-this controls her symptoms well we will continue with topical triamcinolone as needed.    ROS: negative except as per HPI    OBJECTIVE:   The patient appears well, alert, oriented x 3, in no distress.  /76 (Patient Site: Right Arm, Patient Position: Sitting, Cuff Size: Adult Regular)  Pulse 62  Temp 98.1  F (36.7  C) (Oral)   Resp 20  Wt 130 lb (59 kg)  BMI 24.56 kg/m2    Lungs: clear, good air entry, no wheezes, rhonchi or rales.   Cardiac: S1 and S2 normal, no murmurs, regular rate and rhythm.   Abdomen: normal bowel sounds, soft   Extremities: show no edema, normal peripheral pulses.   Neurological: normal, no focal findings.  Skin: clear, dry, no rashes/lesions  Psych- normal mood and affect      Pt states an understanding and agrees to the above plan.  Greater than 25 minutes was spent today in interview and examination with Maria D Moore with more than 50% of that time in counseling and coordination of care.

## 2021-06-14 NOTE — PROGRESS NOTES
Assessment & Plan     Skin tear of right upper arm without complication, initial encounter  This does not appear infected today.  Covered the skin tear area, approximately 4 x 2 cm with bacitracin and a nonstick Telfa pad.  Wrap the upper arm in gauze and secured with paper tape.  Advised daily dressing changes, letting the wound air dry after bathing, then replacing the dressing.  Discussed watching for spreading redness concerning for infection.    Fall, initial encounter  This was a mechanical fall, patient denies any loss of consciousness.  She does not appear to have sustained any other significant injuries.  She has some facial bruising, plugged right naris, no obviously deviated septum or nasal bone fractures.  No other tenderness in the arms, legs or back.  No neck pain.    Actinic keratosis  2 actinic keratoses on the right forearm were frozen today via the freeze-thaw-freeze method with liquid nitrogen.  Patient tolerated the procedure well and there were no immediate complications.    Nose congested  No obviously deviated septum on examination.  Likely related to swelling after her fall.  Discussed that if this remains swollen after a couple of weeks, she is more likely to have a deviated septum that would need to be surgically repaired.  She is overall not interested in this.    Hair thinning  Discussed options including seeing a dermatologist versus trying some topical Rogaine.  She will ask the pharmacist and try some Rogaine initially.      Return in about 1 week (around 1/28/2021), or if symptoms worsen or fail to improve - redness of arm.    Soo Gaona MD  [facility]    Donovan Moore is 86 y.o. and presents to clinic today for the following health issues   HPI     Patient presents today as a late add-on to clinic after having fallen 6 days ago at home.  She states that she was walking in clogs, and caught her foot on the floor and fell  "forward.  She is unaware if she caught herself with her arms, but she did hit her forehead on the floor.  She had a \"egg\" on her forehead which has been improving.  She has some bruising around her eyes.  She states she never lost consciousness.  She denies any pain in her wrists, elbows, knees, hips, back or neck.  She has some plugging of her right naris, never had bloody nose.  She has some flaky lesions on her right forearm that she would like me to look at today.  She also wanted me to look at her back to see if there were any abnormal skin lesions.  She is most concerned about a skin tear on her right upper arm that continues to bleed, especially at night.  At the end of the visit, patient mentions hair thinning, especially in the frontal portion of her scalp.      Review of Systems  Negative except as noted above      Objective    /76 (Patient Site: Left Arm, Patient Position: Sitting, Cuff Size: Adult Regular)   Pulse 76   Ht 5' 0.25\" (1.53 m)   Wt 123 lb 6.4 oz (56 kg)   Breastfeeding No   BMI 23.90 kg/m    Body mass index is 23.9 kg/m .  Physical Exam  General: Well-developed well-nourished female, no apparent distress  Skin: Bruising around the eyes bilaterally, approximately 4 x 2 cm skin tear right upper arm, flaky white lesions x2 right forearm, seborrheic keratoses upper back, hair thinning frontal scalp, no patchy alopecia  Face: No tenderness to palpation over the nasal bridge, no step-off defects palpated over the forehead or eyebrows, bruising central forehead and around both eyes  Eyes: Extraocular movements intact, no subconjunctival hemorrhages  Neurologic: Alert and oriented x3, good historian  Back: Nontender to palpation, atraumatic in appearance, no tenderness  Extremities: No bruising or swelling noted over the forearms, wrists, elbows, arthritis evident in the right wrist          "

## 2021-06-14 NOTE — PROGRESS NOTES

## 2021-06-15 NOTE — PROGRESS NOTES
Completed signed forms with Return to Work note faxed to :Rosanna Logid P: [phone]     [facility]  [address] [place] [id number]  F: [phone].     Ricarda Tirado  [facility]  General Surgery  P: [phone]  F: [phone]

## 2021-06-15 NOTE — TELEPHONE ENCOUNTER
I attempted to reach pt but received her voicemail. Left message for her to call me back at her convenience.

## 2021-06-15 NOTE — ANESTHESIA PROCEDURE NOTES
Arterial Line  Reason for Procedure: hemodynamic monitoring  Patient location during procedure: OR pre-induction  Start time: 2/9/2021 4:37 PM  End time: 2/9/2021 4:42 PM  Staffing:  Performing  Anesthesiologist: Pavan Diaz MD  Sterile Precautions:  sterile barriers used during insertion: cap, mask, sterile gloves, large sheet, and hand hygiene used.  Arterial Line:   Immediately prior to procedure a time out was called to verify the correct patient, procedure, equipment, support staff and site/side marked as required  Laterality: right  Location: radial  Prepped with: ChloroPrep    Needle gauge: 20 G  Number of Attempts: 1  Secured with: tape, pressure dressing and transparent dressing  Flushed with: saline  1% lidocaine local anesthesia used for skin prep.   See MAR for additional medications given.  Ultrasound evaluation of access site: yes  Vessel patent by US exam    Concurrent real time visualization of needle entry    Permanent ultrasound image captured

## 2021-06-15 NOTE — ANESTHESIA CARE TRANSFER NOTE
Patient spontaneously breathing.  Tidal volumes > 400ml.  Following commands, suctioned and extubated with balloon down.  O2 via mask at 6L.  To PACU, VSS, SBAR report to RN per institutional handoff policy and procedure.  Transfer of care.    Last vitals:   Vitals:    02/09/21 1800   BP: 120/66   Pulse: 84   Resp: 20   Temp: 36.6  C (97.8  F)   SpO2: 97%     Patient's level of consciousness is drowsy  Spontaneous respirations: yes  Maintains airway independently: yes  Dentition unchanged: yes  Oropharynx: oropharynx clear of all foreign objects    QCDR Measures:  ASA# 20 - Surgical Safety Checklist: WHO surgical safety checklist completed prior to induction    PQRS# 430 - Adult PONV Prevention: 4558F - Pt received => 2 anti-emetic agents (different classes) preop & intraop  ASA# 8 - Peds PONV Prevention: NA - Not pediatric patient, not GA or 2 or more risk factors NOT present  PQRS# 424 - [place]-op Temp Management: 4559F - At least one body temp DOCUMENTED => 35.5C or 95.9F within required timeframe  PQRS# 426 - PACU Transfer Protocol: - Transfer of care checklist used  ASA# 14 - Acute Post-op Pain: ASA14B - Patient did NOT experience pain >= 7 out of 10

## 2021-06-15 NOTE — PROGRESS NOTES
Hospital Follow-up Visit:    Assessment/Plan:     1. Perforated duodenal ulcer (H)  Symptomatic COVID-19 Virus (CORONAVIRUS) PCR    HM1(CBC and Differential)   2. Hospital discharge follow-up  Symptomatic COVID-19 Virus (CORONAVIRUS) PCR    HM1(CBC and Differential)     Medical decision making: Patient is here today for follow-up of hospital discharge.  Hospital discharge summary is reviewed, surgical notes and imaging is reviewed.  Her labs are reviewed    She is an 86-year-old female who was admitted via emergency room for abdominal pain and found to have a perforated duodenal ulcer.  She underwent laparotomy with repair of the ulcer and discharged after she was stable.  A CBC was done today and this was normal with improved hemoglobin at 11.7.  Discussed that to be on safe side, she should stay off aspirin for now at least until her endoscopy.      Subjective:     Chief Complaint   Patient presents with     Follow-up     INF for pererated duanal ulcer 02/09-02/15, Patient feels tired,   Needs covid test       Maria D Moore is a 86 y.o. female who presents for a hospital discharge follow up.  Patient presented to the emergency room for worsening abdominal pain.  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  She was noting intermittent pain between shoulder blades and mostly on the right shoulder and having difficulty breathing.  During emergency room visit, she became hypotensive.  A CT exam was significant for perforation of gastric ulcer with free air.  Patient was admitted in the hospital and a surgical consult was obtained.  She was treated with IV antibiotics and surgical repair.  An exploratory laparotomy was done with placement of a gastrostomy tube and repair of a Garrett patch of duodenal ulcer.  She was continued on antibiotics for peritonitis and discharged when she was tolerating p.o. intake well and having regular BM.  Her vitals  stabilized.  She has discontinued her aspirin after the discharge.  She was told that she can perhaps use 81 mg again.  She is scheduled for follow-up endoscopy.  She denies any pain right now but does not have a great appetite.  She denies any bloody or tarry stools.    Hospital/Nursing Home/IP Rehab Facility: [facility]  Date of Admission: 2/9/2021  Date of Discharge: 2/50/2021  Reason(s) for Admission: Abdominal pain, perforated duodenal ulcer            Do you have any problems taking your medication regularly?  None       Have you had any changes in your medication since discharge? None       Have you had any difficulty following your discharge or treatment plan?  No    Summary of hospitalization:  Hospital discharge summary reviewed  Diagnostic Tests/Treatments reviewed.  Follow up needed: Needs follow-up endoscopy that has been schedule for April 12.  Other Healthcare Providers Involved in Patient's Care: Patient Care Team:  Flex Bain MD as PCP - General (Family Medicine)  Flex Bain MD as Assigned PCP  Radha Contreras MD as Assigned Heart and Vascular Provider      Update since discharge: {improved   Information from family, SNF, care coordination: none     Post Discharge Medication Reconciliation: discharge medications reconciled, continue medications without change  Plan of care communicated with: patient    Objective:     Vitals:    02/26/21 1359   BP: 126/72   Pulse: 75   Resp: 18   Weight: 121 lb 9.6 oz (55.2 kg)   Height: 5' (1.524 m)         Physical Exam:  GENERAL: Healthy, alert and no distress  EYES: Eyes grossly normal to inspection. No discharge or erythema, or obvious scleral/conjunctival abnormalities.  RESP: lungs clear to auscultation - no rales, rhonchi or wheezes  CV: regular rates and rhythm  ABDOMEN: well-healed incision No erythema or drainage, bowel sounds are normal.  NEURO: Cranial nerves grossly intact. Mentation and speech appropriate for  age.  PSYCH: Mentation appears normal, affect normal/bright, judgement and insight intact, normal speech and appearance well-groomed        Coding guidelines for this visit:  Type of Medical   Decision Making Face-to-Face Visit       within 7 Days of discharge Face-to-Face Visit        within 14 days of discharge   Moderate Complexity [id number]   High Complexity [id number]       Electronically signed by Flex Bain MD 02/26/21 2:27 PM

## 2021-06-15 NOTE — ANESTHESIA PROCEDURE NOTES
Central line    Start time: 2/9/2021 4:51 PM  End time: 2/9/2021 4:58 PM  Patient location: OR Post-induction  Indications: vascular access  Performing Anesthesiologist: Cesar Li MD  Pre-procedure Checklist  Completed: patient identified, site marked, risks, benefits, and alternatives discussed, timeout performed, consent obtained, hand hygiene performed, all elements of maximal sterile barriers used including cap, mask, gown, sterile gloves, and large sheet, skin prep agent completely dried prior to procedure and emergent situation    Procedure Details:  Lidocaine 1% local anesthesia used for skin prep  Location details: left internal jugular  Catheter type: TLCC    Lumens:triple lumenNumber of attempts: 1    Ultrasound evaluation of access site: yes   Sterile gel and probe cover used in ultrasound-guided central venous catheter insertion  Vessel patent by US exam  Concurrent real time visualization of needle entry  Visualized anatomic structures normal  Ultrasound permanent image saved  No Pathological Findings  Manometry confirmation of venous access    Post-procedure:   line sutured and Antimicrobial disks with CHG applied  Assessment: blood return through all ports and free fluid flow  Complications: none  Comments: Sutured at 17cm at skin. CHG disk and tegaderm applied.

## 2021-06-15 NOTE — TELEPHONE ENCOUNTER
----- Message from Sienna Smith sent at 3/11/2021  1:39 PM CST -----  General phone call:  SHOULD PATIENT STAY ON ASPRIN?  PLEASE CLAL  Caller: PATIENT  Primary cardiologist: DR GUERRA  Detailed reason for call: SEE ABOVE  New or active symptoms? NO  Best phone number: [phone]  Best time to contact: ANY TIME  Ok to leave a detailed message? YES  Device? NO    Additional Info:

## 2021-06-15 NOTE — TELEPHONE ENCOUNTER
Incoming call from pt regarding protonix. Was prescribed by Dr Gonzalez and she does not need any more follow ups with him. She is requesting a 90 day Rx be sent in cause she cannot afford the copay every month. Pt said Dr Gonzalez told her she will be on this for life now. Rx pended. [facility]

## 2021-06-15 NOTE — TELEPHONE ENCOUNTER
I called Maria D and we discussed her questions about her G-tube. She has some mild redness, which I informed her was normal, but no pain or drainage. Instructed for her to keep the area clean and dry. She has a post-op appt on Monday with Dr. Pavon and will call if she has any concerns prior to this.

## 2021-06-15 NOTE — PATIENT INSTRUCTIONS - HE
Recent Results (from the past 168 hour(s))   HM1 (CBC with Diff)    Collection Time: 02/26/21  3:09 PM   Result Value Ref Range    WBC 5.6 4.0 - 11.0 thou/uL    RBC 3.77 (L) 3.80 - 5.40 mill/uL    Hemoglobin 11.7 (L) 12.0 - 16.0 g/dL    Hematocrit 35.7 35.0 - 47.0 %    MCV 95 80 - 100 fL    MCH 31.0 27.0 - 34.0 pg    MCHC 32.8 32.0 - 36.0 g/dL    RDW 14.3 11.0 - 14.5 %    Platelets 355 140 - 440 thou/uL    MPV 8.9 7.0 - 10.0 fL    Neutrophils % 57 50 - 70 %    Lymphocytes % 17 (L) 20 - 40 %    Monocytes % 13 (H) 2 - 10 %    Eosinophils % 11 (H) 0 - 6 %    Basophils % 2 0 - 2 %    Immature Granulocyte % 0 <=0 %    Neutrophils Absolute 3.2 2.0 - 7.7 thou/uL    Lymphocytes Absolute 1.0 0.8 - 4.4 thou/uL    Monocytes Absolute 0.7 0.0 - 0.9 thou/uL    Eosinophils Absolute 0.6 (H) 0.0 - 0.4 thou/uL    Basophils Absolute 0.1 0.0 - 0.2 thou/uL    Immature Granulocyte Absolute 0.0 <=0.0 thou/uL           Results for NATALIA TEAGUE (MRN [id number]) as of 2/26/2021 15:26   Ref. Range 2/12/2021 06:27 2/13/2021 06:21 2/14/2021 06:20 2/15/2021 06:25 2/26/2021 15:09   Hemoglobin Latest Ref Range: 12.0 - 16.0 g/dL 10.2 (L) 10.9 (L) 11.0 (L) 10.5 (L) 11.7 (L)

## 2021-06-15 NOTE — TELEPHONE ENCOUNTER
Left detailed message encouraging patient to contact GI or surgery for when she may safely resume aspirin. The clinic direct number left for patient to return the call with further questions or concerns.

## 2021-06-15 NOTE — TELEPHONE ENCOUNTER
Maria D would like to talk to a nurse before 11;30 today (she has another appt) if at all possible. She needs a RX rewritten that Dr. Pavon prescribed she needs a 90 day supply instead of 3 30 day. It will save her in copays.  She also wants to know will her PCP need to take over prescribing this med or will it be Dr. Pavon?    Please call Maria D back and advise.

## 2021-06-15 NOTE — TELEPHONE ENCOUNTER
Patient  had surgery 2/9 with Savanah she has tubes in and she says it is getting red around one of the tubes.  Her PO is  Scheduled for Monday.    Please call Maria D and advise.  Thanks!

## 2021-06-15 NOTE — PROGRESS NOTES
HPI: Pt is here for follow up of a exploratory laparotomy oversew of perforated ulcer and a gastrostomy tube to pexied the stomach..  She is doing well. Her appetite is good, and bowel function regular.  No fevers or chills. Ambulating without problems.  She is generally quite well considering the her age and her problems.  She still has his hiatal hernia but she knows her stomach pexied to keep it hopefully down and out of her chest.  She is eating really well and is gaining some weight back already.      /68 (Patient Site: Right Arm, Patient Position: Sitting, Cuff Size: Adult Regular)       EXAM: This is a  86 y.o. WOMAN in no distress  GENERAL: Appears well  CHEST clear  CVS S1S2 NSR  ABDOMEN: Soft, non-tender.  I removed her staples today Steri-Strips were applied and also removed her G-tube I applied a pressure dressing over this and told to keep it on for 24 hours.  EXT: warm, moves without difficulty      Assessment/Plan:   1. Perforated duodenal ulcer (H)  Removed her staples today renewed her Protonix  Also gave her a work slip for another 2 weeks  Removed her G-tube this should seal up in 24 hours told to keep dressing on until then she should avoid showering heavy lifting strenuous activities until then.  For standpoint of follow-up she is not to see me back when she has troubles or problems or issues.  She is getting scheduled EGD already to look at her stomach and through GI suspect they will also do biopsies at that time point.    - pantoprazole (PROTONIX) 40 MG tablet; Take 1 tablet (40 mg total) by mouth 2 (two) times a day.  Dispense: 60 tablet; Refill: 3      Clinton Pavon MD  [facility] Department of Surgery

## 2021-06-15 NOTE — ANESTHESIA PREPROCEDURE EVALUATION
Anesthesia Evaluation      Patient summary reviewed   No history of anesthetic complications     Airway   Mallampati: III  Neck ROM: full   Pulmonary - negative ROS   (+) decreased breath sounds,     PE comment: tachypneic                         Cardiovascular - negative ROS  (+) dysrhythmias (h/o SVT s/p ablation 2018), , hypercholesterolemia,     (-) murmur  ECG reviewed  Rhythm: regular  Rate: normal,    no murmur      Neuro/Psych - negative ROS     Comments: Macular degeneration    Endo/Other - negative ROS   (+) hypothyroidism,      GI/Hepatic/Renal - negative ROS   (+) hiatal hernia,        Other findings: Abdominal free air/peritonitis/hypotension  Large hiatal hernia      Dental    (+) caps and bridge                       Anesthesia Plan  Planned anesthetic: general endotracheal  Etomidate/fent/sux  CVC/A-line/PIV    ASA 4 - emergent   Induction: intravenous   Anesthetic plan and risks discussed with: patient  Anesthesia plan special considerations: antiemetics, CVP line, arterial catheterization, IV therapy two IVs,   Post-op plan: routine recovery        covid test pending      Chemistry        Component Value Date/Time     02/09/2021 1359    K 4.1 02/09/2021 1359     02/09/2021 1359    CO2 22 02/09/2021 1359    BUN 19 02/09/2021 1359    CREATININE 0.84 02/09/2021 1359     02/09/2021 1359        Component Value Date/Time    CALCIUM 9.1 02/09/2021 1359    ALKPHOS 68 02/09/2021 1359    AST 25 02/09/2021 1359    ALT 14 02/09/2021 1359    BILITOT 0.4 02/09/2021 1359        Lab Results   Component Value Date    WBC 7.3 02/09/2021    HGB 13.4 02/09/2021    HCT 41.1 02/09/2021    MCV 95 02/09/2021     02/09/2021     Lab Results   Component Value Date    INR 1.16 (H) 06/01/2018    INR 1.12 (H) 05/31/2018         Echocardiogram 1 June 2018:  1. Normal left ventricular size and systolic performance with a visually estimated ejection fraction of 65%.   2. There is moderate mitral  insufficiency.   3. There is moderate tricuspid insufficiency.   4. Normal right ventricular size and systolic performance.   5. There is moderate biatrial enlargement.    When compared to the prior real-time echocardiogram dated 14 August 2010, the degree of mitral insufficiency has increased from mild to now moderate.     Echocardiogram 14 August 2010:  1. Normal left ventricular size and systolic performance with ejection fraction of 55%.  2. Moderate tricuspid insufficiency.  3. Mild left atrial enlargement.     Nuclear perfusion imaging study 1 August 2018:  1. Small reversible defect in the anterior/apical segments.  Significant splanchnic artifact reducing sensitivity of finding.  2. Normal left ventricular systolic performance with ejection fraction of 81%.

## 2021-06-15 NOTE — ANESTHESIA POSTPROCEDURE EVALUATION
Patient: Maria D V Oscar  Procedure(s):  EXPLORATORY LAPAROTOMY, PLACEMENT OF GASTROSTOMY TUBE, REPAIR GRAHM PATCH OF DUODENAL ULCER  Anesthesia type: general    Patient location: PACU  Last vitals:   Vitals Value Taken Time   /66 02/09/21 1850   Temp  02/09/21 1902   Pulse 78 02/09/21 1901   Resp 10 02/09/21 1901   SpO2 95 % 02/09/21 1901   Vitals shown include unvalidated device data.  Post vital signs: stable  Level of consciousness: awake and responds to simple questions  Post-anesthesia pain: pain controlled  Post-anesthesia nausea and vomiting: no  Pulmonary: unassisted, return to baseline  Cardiovascular: stable and blood pressure at baseline  Hydration: adequate  Anesthetic events: no    QCDR Measures:  ASA# 11 - [place]-op Cardiac Arrest: ASA11B - Patient did NOT experience unanticipated cardiac arrest  ASA# 12 - [place]-op Mortality Rate: ASA12B - Patient did NOT die  ASA# 13 - PACU Re-Intubation Rate: ASA13B - Patient did NOT require a new airway mgmt  ASA# 10 - Composite Anes Safety: ASA10A - No serious adverse event    Additional Notes: Patient tolerated anesthetic very well. Extubated at end of the procedure. Has been stable in PACU, normotensive without any pressors or fluid boluses. Report given to Dr. Silva via telephone.

## 2021-06-16 PROBLEM — Z98.890 S/P EXPLORATORY LAPAROTOMY: Chronic | Status: ACTIVE | Noted: 2021-02-12

## 2021-06-16 PROBLEM — K66.8 FREE INTRAPERITONEAL AIR: Status: ACTIVE | Noted: 2021-02-09

## 2021-06-16 PROBLEM — K26.5 DUODENAL ULCER WITH PERFORATION (H): Chronic | Status: ACTIVE | Noted: 2021-02-09

## 2021-06-16 PROBLEM — K26.5 PERFORATED DUODENAL ULCER (H): Status: ACTIVE | Noted: 2021-02-15

## 2021-06-16 PROBLEM — E03.9 HYPOTHYROIDISM: Status: ACTIVE | Noted: 2021-02-15

## 2021-06-16 PROBLEM — L57.0 ACTINIC KERATOSIS: Status: ACTIVE | Noted: 2021-01-21

## 2021-06-16 PROBLEM — L65.9 HAIR THINNING: Status: ACTIVE | Noted: 2021-01-21

## 2021-06-16 NOTE — PROGRESS NOTES
[facility]  [address] [facility] [address]  Dept: [phone]  Dept Fax: [phone]  Primary Provider: Merlin Bain MD  Pre-op Performing Provider: MERLIN BAIN      PREOPERATIVE EVALUATION:  Today's date: 4/8/2021    Maria D Moore is a 86 y.o. female who presents for a preoperative evaluation.    Surgical Information:  Surgery/Procedure: Esophagogastroduodenoscopy  Surgery Location: [facility]   Surgeon: Dr. Jordan  Surgery Date: 04/12/2021  Time of Surgery: Unknown  Where patient plans to recover: At home with family   Fax number for surgical facility: [phone]      Type of Anesthesia Anticipated: General    Assessment & Plan      The proposed surgical procedure is considered LOW risk.  1. Preop general physical exam  HM1(CBC and Differential)    Comprehensive Metabolic Panel    Asymptomatic COVID-19 Virus (CORONAVIRUS) PCR    Asymptomatic COVID-19 Virus (CORONAVIRUS) PCR   2. Hypothyroidism, unspecified type  Thyroid Cascade   3. Hyperlipidemia, unspecified hyperlipidemia type  Lipid Cascade FASTING   4. Elevated brain natriuretic peptide (BNP) level  BNP(B-type Natriuretic Peptide)   5. Dyspnea, unspecified type   BNP(B-type Natriuretic Peptide)   6. Actinic keratosis  Ambulatory referral to Dermatology       Medical decision making: Patient is a 86-year-old female with history of hyperlipidemia, hypothyroidism who had a perforated duodenal ulcer last month.  She underwent laparotomy with repair.  She is now scheduled for follow-up endoscopy and needs preoperative exam done as above.  Patient is due for health maintenance/annual wellness visit in a couple of months.  However, she would like to move ahead with labs in today's visit.  These are ordered for her.  Patient was noted to have elevated D-dimer true peptide during recent hospitalization.  I discussed with patient that he will go ahead and check to make sure that this is normal.  She  "does not have any other overt cardiac condition.  This was done noting her shortness of breath.  Patient verbalizes understanding of the plan.  Patient was noted actinic keratosis.  She had some freezing done earlier this year.  Refer to dermatology for further management.       Risks and Recommendations:  The patient has the following additional risks and recommendations for perioperative complications:   - No identified additional risk factors other than previously addressed    Medication Instructions:  Patient is to take all scheduled medications on the day of surgery    RECOMMENDATION:  APPROVAL GIVEN to proceed with proposed procedure, without further diagnostic evaluation.        Subjective     HPI related to upcoming procedure: Patient is scheduled for endoscopy for follow-up of her duodenal ulcer.  She had a repair of perforated duodenal ulcer in February 2020.  Except for some discomfort \"only surgical site, she otherwise feels well.  She denies any problem with appetite, BM.  She has no hematemesis, melena or dark stools.  She is a retired nurse unable to give a good history.  She would like to proceed with her health maintenance labs today rather than coming back for annual wellness visit.  This is reasonable.    Preop Questions 4/8/2021   Have you ever had a heart attack or stroke? No   Have you ever had surgery on your heart or blood vessels, such as a stent placement, a coronary artery bypass, or surgery on an artery in your head, neck, heart, or legs? YES - Patient had EP ablation done   Do you have chest pain with activity? No   Do you have a history of  heart failure? No   Do you currently have a cold, bronchitis or symptoms of other infection? No   Do you have a cough, shortness of breath, or wheezing? No   Do you or anyone in your family have previous history of blood clots? No   Do you or does anyone in your family have a serious bleeding problem such as prolonged bleeding following surgeries or " cuts? No   Have you ever had problems with anemia or been told to take iron pills? No   Have you had any abnormal blood loss such as black, tarry or bloody stools, or abnormal vaginal bleeding? No   Have you ever had a blood transfusion? No   Are you willing to have a blood transfusion if it is medically needed before, during, or after your surgery? Yes   Have you or any of your relatives ever had problems with anesthesia? No   Do you have sleep apnea, excessive snoring or daytime drowsiness? No   Do you have any artifical heart valves or other implanted medical devices like a pacemaker, defibrillator, or continuous glucose monitor? No   Do you have artificial joints? YES - Knee   Are you allergic to latex? No         Health Care Directive:  Patient does not have a Health Care Directive or Living Will: Patient states has Advance Directive and will bring in a copy to clinic.    Preoperative Review of :    reviewed - no record of controlled substances prescribed.    ANEMIA - Patient has a recent history of moderate-severe anemia, which has not been symptomatic.     HYPOTHYROIDISM - Patient has a longstanding history of chronic Hypothyroidism. Patient has been doing well, noting no tremor, insomnia, hair loss or changes in skin texture. Continues to take medications as directed, without adverse reactions or side effects. Last TSH   Lab Results   Component Value Date    TSH 1.24 06/29/2020   .        Review of Systems  CONSTITUTIONAL: NEGATIVE for fever, chills, change in weight  ENT/MOUTH: Negative for ear, mouth, and throat problems  RESP: NEGATIVE for significant cough or SOB  CV: NEGATIVE for chest pain, palpitations or peripheral edema  GI: NEGATIVE for nausea, abdominal pain, heartburn, or change in bowel habits and Has some aching  at the surgical site.  HEME/ALLERGY/IMMUNE: NEGATIVE for chills, fever, night sweats and weight loss  PSYCHIATRIC: NEGATIVE for changes in mood or affect    Patient Active  Problem List    Diagnosis Date Noted     Perforated duodenal ulcer (H) 02/15/2021     Hypothyroidism 02/15/2021     S/P exploratory laparotomy 02/12/2021     Free intraperitoneal air 02/09/2021     Duodenal ulcer with perforation (H) 02/09/2021     Hiatal hernia      Actinic keratosis 01/21/2021     Hair thinning 01/21/2021     SVT (supraventricular tachycardia) (H) 05/31/2018     Hypotension, unspecified hypotension type      Fatigue      Temperature Intolerance To Cold   (Consistent)      Abnormal Blood Chemistry      Osteoarthritis Of The Ankle/Foot (Multiple Joints)      Mixed hyperlipidemia      Furuncle      Generalized Osteoarthritis Of Multiple Sites      Edema      Osteoporosis Senile      Vaginal Discharge (Symptom)      Back Pain      Automatic Atrial Tachycardia      Lightheadedness      Past Medical History:   Diagnosis Date     Back pain      Furuncle      Hyperlipidemia      Macular degeneration      Macular degeneration      Osteoarthritis      Osteoporosis      SVT (supraventricular tachycardia) (H)      SVT (supraventricular tachycardia) (H)      Past Surgical History:   Procedure Laterality Date     EP ABLATION SVT Right 6/26/2018    Procedure: EP Ablation Supra Ventricular;  Surgeon: Dany Renae MD;  Location: [facility];  Service:      EXPLORATORY LAPAROTOMY N/A 2/9/2021    Procedure: EXPLORATORY LAPAROTOMY,  REPAIR GRAHM PATCH OF DUODENAL ULCER;  Surgeon: Clinton Pavon MD;  Location: [facility];  Service: General     GASTROSTOMY TUBE PLACEMENT N/A 2/9/2021    Procedure: PLACEMENT OF GASTROSTOMY TUBE,;  Surgeon: Clinton Pavon MD;  Location: [facility];  Service: General     HYSTERECTOMY       OOPHORECTOMY       TX APPENDECTOMY      Description: Appendectomy;  Recorded: 12/31/2007;     TX REMOVAL ADENOIDS,PRIMARY,<11 Y/O      Description: Adenoidectomy;  Recorded: 12/31/2007;     TX REMOVAL OF TONSILS,<11 Y/O      Description: Tonsillectomy;  Recorded:  12/31/2007;     WV TOTAL KNEE ARTHROPLASTY      Description: Total Knee Arthroplasty;  Recorded: 12/31/2007;     WV VAG HYST,RMV TUBE/OVARY,FIX ENTEROCE      Description: Vag Hysterect Uterus <250g Remov Both Ovaries Rep Enterocele;  Recorded: 12/31/2007;     Current Outpatient Medications   Medication Sig Dispense Refill     aspirin 81 MG EC tablet Take 81 mg by mouth daily.       calcium carbonate (OS-DOMONIQUE) 600 mg (1,500 mg) tablet Take 600 mg by mouth every other day.              cholecalciferol, vitamin D3, 5,000 unit Tab Take by mouth. Take 1 tablet daily.       levothyroxine (SYNTHROID, LEVOTHROID) 25 MCG tablet TAKE 1 TABLET DAILY AT 6:00 A.M. 90 tablet 1     metoprolol succinate (TOPROL-XL) 50 MG 24 hr tablet Take 1 tablet (50 mg total) by mouth at bedtime. 90 tablet 3     niacin 500 MG tablet Take 500 mg by mouth daily with breakfast.       OMEGA-3/DHA/EPA/FISH OIL (FISH OIL-OMEGA-3 FATTY ACIDS) 300-1,000 mg capsule Take 2,000 mg by mouth daily.        pantoprazole (PROTONIX) 40 MG tablet Take 1 tablet (40 mg total) by mouth 2 (two) times a day. 180 tablet 3     simvastatin (ZOCOR) 20 MG tablet TAKE 1 TABLET DAILY AT BEDTIME 90 tablet 3     VIT C/E/ZN/COPPR/LUTEIN/ZEAXAN (PRESERVISION AREDS 2 ORAL) Take 1 tablet by mouth 2 (two) times a day.       Current Facility-Administered Medications   Medication Dose Route Frequency Provider Last Rate Last Admin     denosumab 60 mg (PROLIA 60 mg/ml)  60 mg Subcutaneous Q6 Months Taylor Mckeonecca L, NP   60 mg at 09/18/19 0832     denosumab 60 mg (PROLIA 60 mg/ml)  60 mg Subcutaneous Q6 Months Taylor Mckeonecca L, NP   60 mg at 12/31/20 1000       Allergies   Allergen Reactions     Cephalexin Shortness Of Breath     Erythromycin Base Rash     Penicillins      Sulfa (Sulfonamide Antibiotics) Rash     Vancomycin Itching       Social History     Tobacco Use     Smoking status: Never Smoker     Smokeless tobacco: Never Used   Substance Use Topics     Alcohol use: No      Family  History   Problem Relation Age of Onset     Stroke Father      Social History     Substance and Sexual Activity   Drug Use No        Objective     There were no vitals taken for this visit.  Physical Exam    GENERAL APPEARANCE: healthy, alert and no distress     EYES: EOMI, PERRL     HENT: ear canals and TM's normal and nose and mouth without ulcers or lesions     NECK: no adenopathy, no asymmetry, masses, or scars and thyroid normal to palpation     RESP: lungs clear to auscultation - no rales, rhonchi or wheezes     CV: regular rates and rhythm, normal S1 S2, no S3 or S4 and no murmur, click or rub     ABDOMEN: Minimally tender without rebound at the surgical site.  Wound is well-healed.  No organomegaly.     MS: extremities normal- no gross deformities noted, no evidence of inflammation in joints, FROM in all extremities.     SKIN: Noted actinic keratosis     NEURO: Normal strength and tone, sensory exam grossly normal, mentation intact and speech normal     PSYCH: mentation appears normal. and affect normal/bright     LYMPHATICS: No cervical adenopathy    Recent Labs   Lab Test 02/26/21  1509 02/15/21  0625 02/14/21  0620 02/12/21  0628 02/12/21  0628   HGB 11.7* 10.5* 11.0*   < >  --     295 282   < >  --    NA  --   --  139  --  139   K  --  3.8 3.6   < > 3.5   CREATININE  --   --  0.66  --  0.65    < > = values in this interval not displayed.        PRE-OP Diagnostics:   Labs pending at this time. Results will be reviewed when available.  No EKG this visit, completed in the last 90 days.    REVISED CARDIAC RISK INDEX (RCRI)   The patient has the following serious cardiovascular risks for perioperative complications:   - No serious cardiac risks = 0 points    RCRI INTERPRETATION: 0 points: Class I (very low risk - 0.4% complication rate)         Signed Electronically by: Flex Bain MD    Copy of this evaluation report is provided to requesting physician.

## 2021-06-16 NOTE — TELEPHONE ENCOUNTER
----- Message from Mariama Khoury sent at 4/9/2021 12:21 PM CDT -----  Regarding: [facility]Y PT / DISCUSS MEDICATION CHANGE AND LAB RESULTS FOR HF  General phone call:    Caller: Maria D Strong     Primary cardiologist: HEIDY     Detailed reason for call: Pt states that she recently had some lab work done and was told that she should follow up with HEIDY for heart failure. Pt also wants to discuss her cholesterol medication. She has an appt scheduled with HEIDY on 5/19, but would like to get in sooner if possible. Please advise.     Best phone number: [phone]    Best time to contact: Anytime     Ok to leave a detailed message? Yes     Device? No     Additional Info:

## 2021-06-16 NOTE — TELEPHONE ENCOUNTER
Incoming call from pt about lab results. No notes on the results yet. Please advise.    I will fax all her stuff for her preop also

## 2021-06-16 NOTE — TELEPHONE ENCOUNTER
I called patient and discussed all results.  BNP  is borderline.  Reviewed last stress test.  Cholesterol panel is also borderline.  Total cholesterol is increased but HDL is also fairly good.  She can follow-up with Dr. Contreras in few weeks time to see if any additional work-up is needed.  Patient verbalizes understanding.    Flex Bain MD  4/9/2021

## 2021-06-16 NOTE — TELEPHONE ENCOUNTER
Dr. Contreras,  Please review 4/8/21 Pre-op Exam with labs by Dr. Flex Bain.   Any new orders or recommendations prior to follow up 5/19/21?  Thank you,  Sandy  --------------------------------------------------  Reached out to patient to discuss her concerns. She suffered a perforated duodenal ulcer 2/9/21 and is anticipating a follow up esophagogastroduodenoscopy 4/12/21.    Due to shortness of breath noted while hospitalized a BNP was drawn with health maintenance labs.  Per Dr. Flex Bain telephone note 4/9/21:  Flex Bain MD       4/9/21 12:16 PM  Note     I called patient and discussed all results.  BNP  is borderline.  Reviewed last stress test.  Cholesterol panel is also borderline.  Total cholesterol is increased but HDL is also fairly good.  She can follow-up with Dr. Contreras in few weeks time to see if any additional work-up is needed.  Patient verbalizes understanding.     Flex Bain MD  4/9/2021          Patient is requesting that Dr. Contreras review labs and advise if medication changes are needed prior to follow up scheduled 5/19/21. Offered a sooner clinic visit and patient wishes to wait for advisement from Dr. Contreras.  Informed patient Dr. Contreras is out of the clinic and she will be contacted after review next week. Understanding verbalized.

## 2021-06-16 NOTE — PATIENT INSTRUCTIONS - HE

## 2021-06-17 NOTE — TELEPHONE ENCOUNTER
Telephone Encounter by Sandy Greene RN at 4/13/2021  8:16 AM     Author: Sandy Greene RN Service: -- Author Type: Registered Nurse    Filed: 4/13/2021  8:18 AM Encounter Date: 4/9/2021 Status: Signed    : Sandy Greene RN (Registered Nurse)       Patient called in to my direct line this morning. Recommendations and review discussed with patient. She is comfortable with following up as planned and will call in the interim with any further questions or concerns.     --------------------------------------------------------------  Radha Contreras MD  You 37 minutes ago (7:36 AM)     B-type natriuretic peptide is just outside the normal limits.  This slight elevation would suggest against significant myocardial strain.  Consequently, simply based on laboratory studies do not feel more urgent follow-up is needed unless progressive symptoms in the like.  Consequently, if patient is comfortable with it, can follow-up as planned.  Thanks.

## 2021-06-17 NOTE — TELEPHONE ENCOUNTER
Reason for Call:  Medication or medication refill:    Do you use a [facility]?  Name of the pharmacy and phone number for the current request: [facility] - [place], [place] - [address] [place] [address]    Name of the medication requested: levothyroxine (SYNTHROID, LEVOTHROID) 25 MCG tablet    Other request: Patient would like 90 day supply and more than 1 refills so that way she is not calling all the time. Communicated to patient I will send message to the provider's team.    Can we leave a detailed message on this number? No call back needed    Phone number patient can be reached at:   Cell number on file:    Telephone Information:   Mobile [phone]       Best Time: Any time    Call taken on 5/7/2021 at 8:06 AM by Heidy Kay

## 2021-06-17 NOTE — PROGRESS NOTES
ASSESSMENT/ PLAN    1. Abrasion of leg, left, subsequent encounter  Does not seem to be worsening.  Looks like it is healing.  Advised patient to continue with mupirocin bacterial ointment.  I think her chronic edema in her lower extremities makes it harder to heal.  I did talk about compression stockings which she does have at home but does not want to wear.  Continue to monitor.  I did nomi around the lesion and advised patient to return to clinic if the redness expands beyond the marker.    2. Seborrheic keratoses  Used liquid nitrogen to freeze 5 lesions on upper back.  Patient tolerated the freezing well.      This note was created using Dragon dictation software, spelling errors may occur.     Ivelisse Littlejohn MD        SUBJECTIVE   Maria D Moore is a 83 y.o. old female with a past medical history of osteoarthritis, osteoporosis, hyperlipidemia who presented to clinic today for further evaluation of recheck of wound on her left leg.  She was last seen about a week ago on 4/2/2018 for similar complaint.  Apparently she was in [place] at the end of March and hit her left lower shin against a rickshaw. She denies fever, chills, increasing drainage or redness or swelling or tenderness.  She is very worried about possible infection because she does have a left knee has been replaced and she does not want this infection to spread to her left knee.  She has been putting mupirocin topical antibiotic ointment on it.  She just wants a recheck to make sure that she does not need an oral antibiotic.  She also has several seborrheic keratoses on her back and would like them frozen today.    Review of Systems:  Negative except as noted in HPI    The following portions of the patient's history were reviewed and updated as appropriate: past medical history, past surgical history, family history, allergies, current medications and problem list.    Medical History  Active Ambulatory (Non-Hospital) Problems    Diagnosis      Fatigue     Temperature Intolerance To Cold   (Consistent)     Abnormal Blood Chemistry     Osteoarthritis Of The Ankle/Foot (Multiple Joints)     Mixed hyperlipidemia     Furuncle     Generalized Osteoarthritis Of Multiple Sites     Edema     Osteoporosis Senile     Vaginal Discharge (Symptom)     Back Pain     Automatic Atrial Tachycardia     Lightheadedness     No past medical history on file.    Surgical History  She  has a past surgical history that includes pr removal of tonsils,<13 y/o; pr removal adenoids,primary,<13 y/o; pr total knee arthroplasty; pr appendectomy; pr vag hyst,rmv tube/ovary,fix enteroce; and Hysterectomy.    Social History  Reviewed, and she  reports that she has never smoked. She has never used smokeless tobacco.    Family History  Reviewed, and family history is not on file.    Medications  Reviewed and reconciled    Allergies  Allergies   Allergen Reactions     Cephalexin Shortness Of Breath     Erythromycin Base Rash     Penicillins      Sulfa (Sulfonamide Antibiotics) Rash     Vancomycin Itching         OBJECTIVE  Physical Exam:  Vital signs: /68 (Patient Site: Left Arm, Patient Position: Sitting, Cuff Size: Adult Regular)  Pulse 82  Temp 98.2  F (36.8  C) (Oral)   Resp 16  Wt 125 lb (56.7 kg)  BMI 23.62 kg/m2  Weight: 125 lb (56.7 kg)    General appearance: pleasant, appears stated age, cooperative and in no distress  Musculoskeletal: left mid-shin with 1 cm wound seems to be healing, surrounding erythema, no drainage, no increased warmth or swelling.  She does have bilateral lower leg swelling, trace edema.  Skin: several seborrheic keratoses on back  Neuro: alert oriented x 3, grossly normal otherwise  Psych: normal affect, appropriate conversation

## 2021-06-18 NOTE — PATIENT INSTRUCTIONS - HE
Patient Instructions by Flex Bain MD at 6/29/2020  9:00 AM     Author: Flex Bain MD Service: -- Author Type: Physician    Filed: 6/29/2020  9:49 AM Encounter Date: 6/29/2020 Status: Signed    : Flex Bain MD (Physician)       I have seen her today for scab on your nose that has healed.  Continue to monitor it.  If it reappears, do let me know and I will send you to dermatology.    For possible presyncope/syncope which you described as this flash, we will do an EKG.  As it has happened only one time, you can monitor but if it reoccurs then you need to see the cardiologist again.  Do let me know and we will try to arrange an appointment.    I have noted that you had low hemoglobin in 2018, we will recheck it today.    Tramadol is being refilled, do use it judiciously as it falls under the beers criteria that means medication that are high risk for regularly.  It does increase her risk of fall.  We will need to do a drug urine screen and a controlled substance protocol.    The numbness and tingling of right arm, looks like ulnar neuropathy.  Here is something to read about it.  If symptoms persist then we will need further evaluation.    Patient Education     What is Cubital Tunnel Syndrome?  Cubital tunnel syndrome is a set of symptoms that may occur if the ulnar nerve in your elbow gets pinched. This may happen if you bend or lean on your elbows often.    Your cubital tunnel  The cubital tunnel is a groove in a bone near your elbow. This narrow groove provides a passage for the ulnar nerve, one of the main nerves in your arm. The ulnar nerve can cause funny bone pain if your elbow gets bumped. Your cubital tunnel helps protect this nerve as it passes through your elbow and down to your fingers.  Compressing the ulnar nerve  Bending your elbow compresses the ulnar nerve inside the cubital tunnel. The nerve can get inflamed (irritated) after constant bending and pinching or after getting  hurt. Over time, this can lead to pain or numbness. The pain is often felt in your ring fingers and little fingers.     Bending your elbow as you hold a phone can cause problems over time.      What are its symptoms?    Numbness or tingling in the ring fingers and little fingers    Loss of finger or hand strength    Inability to straighten fingers    Sharp, sudden pain when elbow is touched    Shrinking of hand muscles  The road to healing  You can keep cubital tunnel syndrome from flaring up. Keep your arm straight as much as you can, even while sleeping, to prevent pinching of the ulnar nerve. And use phone headsets and elbow pads. If you still have pain, tell your doctor.   Date Last Reviewed: 5/1/2018 2000-2019 [employer]. [address] [place] [address], [place] [address]. All rights reserved. This information is not intended as a substitute for professional medical care. Always follow your healthcare professional's instructions.

## 2021-06-18 NOTE — PROGRESS NOTES
Preoperative Exam    Scheduled Procedure: EP Ablation Supra Ventricular  Surgery Date:  6/26/18  Surgery Location: [facility], fax [phone]    Surgeon:  Dr. Renae    Assessment/Plan:     1. Pre-op exam  Patient is cleared for surgery at this time with no anticipated complication.  - HM2(CBC w/o Differential)    2. Abnormal Blood Chemistry  Refills of patient's thyroid medication sent to the pharmacy  Patient will return in roughly 1 month for thyroid reevaluation  She does feel symptoms have been improving with little bit of increase in energy as well as not feeling cold as often  - levothyroxine (SYNTHROID, LEVOTHROID) 25 MCG tablet; Take 1 tablet (25 mcg total) by mouth Daily at 6:00 am.  Dispense: 90 tablet; Refill: 0    3. Hypothyroidism  Please #2 above    Surgical Procedure Risk: Intermediate (reported cardiac risk generally 1-5%)  Have you had prior anesthesia?: Yes  Have you or any family members had a previous anesthesia reaction:  No  Do you or any family members have a history of a clotting or bleeding disorder?: No  Cardiac Risk Assessment: no increased risk for major cardiac complications    Patient approved for surgery with general or local anesthesia.      Functional Status: Independent  Patient plans to recover at home with family.     Subjective:      Maria D Moore is a 83 y.o. female who presents for a preoperative consultation.  Reviewed patient's recent hospitalization with her today including laboratory work consultations and labs.  She was hospitalized for SVT converted with adenosine and was started on flecainide.  She has chosen to have an ablation to hopefully correct the problem and avoid long-term use of other medications.  She currently has been feeling well.  She also started on thyroid supplement-she had elevated TSH but normal T4 and T3 levels.  Since starting the medication she has felt better in regards to little more energy and not feeling at school as often.  She  has not noticed any weight fluctuation.  In hindsight she feels that she might have been flipping in and out of SVT a few times in the last month.  Patient is tolerated previous surgeries without complication.    All other systems reviewed and are negative, other than those listed in the HPI.    Pertinent History  Do you have difficulty breathing or chest pain after walking up a flight of stairs: No  History of obstructive sleep apnea: No  Steroid use in the last 6 months: No  Frequent Aspirin/NSAID use: Yes: ASA daily  Prior Blood Transfusion: No  Prior Blood Transfusion Reaction: No  If for some reason prior to, during or after the procedure, if it is medically indicated, would you be willing to have a blood transfusion?:  There is no transfusion refusal.    Current Outpatient Prescriptions   Medication Sig Dispense Refill     aspirin 325 MG tablet Take 325 mg by mouth daily.       calcium carbonate (OS-DOMONIQUE) 600 mg (1,500 mg) tablet Take 600 mg by mouth daily.       cholecalciferol, vitamin D3, 5,000 unit Tab Take by mouth. Take 1 tablet daily.       ciprofloxacin HCl (CIPRO) 500 MG tablet Take 2 tabs 1-2 hours prior to dental procedure 10 tablet 0     flecainide (TAMBOCOR) 50 MG tablet Take 1 tablet (50 mg total) by mouth every 12 (twelve) hours. 60 tablet 0     hydrocortisone 0.5 % cream Apply to affected area twice a day. (Patient taking differently: Apply topically 2 (two) times a day as needed. Apply to affected area twice a day.) 30 g 0     levothyroxine (SYNTHROID, LEVOTHROID) 25 MCG tablet Take 1 tablet (25 mcg total) by mouth Daily at 6:00 am. 30 tablet 0     metoprolol succinate (TOPROL-XL) 50 MG 24 hr tablet Take 1 tablet (50 mg total) by mouth at bedtime. 30 tablet 0     mupirocin (BACTROBAN) 2 % ointment Apply topically 3 (three) times a day as needed.       niacin 500 MG tablet Take 500 mg by mouth daily with breakfast.       OMEGA-3/DHA/EPA/FISH OIL (FISH OIL-OMEGA-3 FATTY ACIDS) 300-1,000 mg  capsule Take 2,000 mg by mouth daily.        simvastatin (ZOCOR) 20 MG tablet TAKE 1 TABLET EVERY DAY AT BEDTIME 90 tablet 3     sodium fluoride-pot nitrate (PREVIDENT 5000 ENAMEL PROTECT) 1.1-5 % Pste Apply to teeth.       traMADol (ULTRAM) 50 mg tablet Take 1 tablet (50 mg total) by mouth every 8 (eight) hours as needed for pain. 90 tablet 0     triamcinolone (KENALOG) 0.1 % cream Apply topically 2 (two) times a day. Apply ointment to affected areas twice daily until rash is gone. (Patient taking differently: Apply topically 2 (two) times a day as needed. Apply ointment to affected areas twice daily until rash is gone.) 60 g 0     VIT C/E/ZN/COPPR/LUTEIN/ZEAXAN (PRESERVISION AREDS 2 ORAL) Take 1 tablet by mouth 2 (two) times a day.       Current Facility-Administered Medications   Medication Dose Route Frequency Provider Last Rate Last Dose     denosumab 60 mg (PROLIA 60 mg/ml)  60 mg Subcutaneous Q6 Months Deisy Mckeon NP   60 mg at 03/02/18 1544        Allergies   Allergen Reactions     Cephalexin Shortness Of Breath     Erythromycin Base Rash     Penicillins      Sulfa (Sulfonamide Antibiotics) Rash     Vancomycin Itching       Patient Active Problem List   Diagnosis     Fatigue     Temperature Intolerance To Cold   (Consistent)     Abnormal Blood Chemistry     Osteoarthritis Of The Ankle/Foot (Multiple Joints)     Mixed hyperlipidemia     Furuncle     Generalized Osteoarthritis Of Multiple Sites     Edema     Osteoporosis Senile     Vaginal Discharge (Symptom)     Back Pain     Automatic Atrial Tachycardia     Lightheadedness     SVT (supraventricular tachycardia)     Hypotension, unspecified hypotension type       Past Medical History:   Diagnosis Date     Macular degeneration      SVT (supraventricular tachycardia)        Past Surgical History:   Procedure Laterality Date     HYSTERECTOMY       KS APPENDECTOMY      Description: Appendectomy;  Recorded: 12/31/2007;     KS REMOVAL ADENOIDS,PRIMARY,<11 Y/O   "    Description: Adenoidectomy;  Recorded: 12/31/2007;     DC REMOVAL OF TONSILS,<13 Y/O      Description: Tonsillectomy;  Recorded: 12/31/2007;     DC TOTAL KNEE ARTHROPLASTY      Description: Total Knee Arthroplasty;  Recorded: 12/31/2007;     DC VAG HYST,RMV TUBE/OVARY,FIX ENTEROCE      Description: Vag Hysterect Uterus <250g Remov Both Ovaries Rep Enterocele;  Recorded: 12/31/2007;       Social History     Social History     Marital status:      Spouse name: N/A     Number of children: N/A     Years of education: N/A     Occupational History     Not on file.     Social History Main Topics     Smoking status: Never Smoker     Smokeless tobacco: Never Used     Alcohol use No     Drug use: No     Sexual activity: Not on file     Other Topics Concern     Not on file     Social History Narrative       Patient Care Team:  Eduardo Trent MD as PCP - General (Family Medicine)          Objective:     Vitals:    06/06/18 1100   BP: 106/62   Pulse: 66   Resp: 16   Temp: 97.7  F (36.5  C)   TempSrc: Oral   SpO2: 95%   Weight: 127 lb (57.6 kg)   Height: 5' 0.04\" (1.525 m)         Physical Exam:  Physical Examination: General appearance - alert, well appearing, and in no distress  Mental status - alert, oriented to person, place, and time  Eyes - pupils equal and reactive, extraocular eye movements intact  Ears - bilateral TM's and external ear canals normal  Mouth - mucous membranes moist, pharynx normal without lesions  Neck - supple, no significant adenopathy  Lymphatics - no palpable lymphadenopathy  Chest - clear to auscultation, no wheezes, rales or rhonchi, symmetric air entry  Heart - normal rate, regular rhythm, normal S1, S2, no murmurs, rubs, clicks or gallops  Abdomen - soft, nontender, nondistended, no masses or organomegaly  Back exam - full range of motion, no tenderness, palpable spasm or pain on motion  Neurological - alert, oriented, normal speech, no focal findings or movement disorder " noted  Musculoskeletal - no joint tenderness, deformity or swelling  Extremities - peripheral pulses normal, no pedal edema, no clubbing or cyanosis  Skin - normal coloration and turgor, no rashes, no suspicious skin lesions noted      There are no Patient Instructions on file for this visit.    EKG: Normal sinus rhythm with resolved ST changes from previous EKG -reviewed EKGs from hospital    Labs:  Labs pending at this time.  Results will be reviewed when available.    Immunization History   Administered Date(s) Administered     Hep A, Adult IM (19yr & older) 04/17/2006, 10/09/2006     Hep A, historic 04/17/2006, 10/09/2006     Pneumo Polysac 23-V 09/07/2016     Tdap 04/15/2013     ZOSTER, LIVE 12/04/2011           Electronically signed by Eduardo Trent MD 06/06/18 10:58 AM

## 2021-06-18 NOTE — PROGRESS NOTES
11/24/1934  Home:[phone] (home) Cell:There is no such number on file (mobile).  Emergency Contact: Darline Alvarez [phone]    +++Important patient information for CSC/Cath Lab staff : None+++    [facility] EP Cath Lab Procedure Order   Ablation Type:  Supraventricular Tachycardia  Specific location of pathway: Right     Diagnosis:  SVT/AVNRT  Anticipated Case Duration:  Standard   Scheduling Needs/Timeframe:  End of june or early julyPlease call pt to schedule    MD Preference: Dr Batool HARKINS Assist:  No Specific MD:  N/A    Current Device: None None  Device Company/Device Rep Needed for Procedure: None    Pre-Procedural Testing needed: None  Mapping System Required:  HILARIO  ICE Needed:  No  Special equipment/Staff needed for case: None  Anesthesia:  Conscious Sedation  Research Protocol:  No    [facility] EP Cath Lab Prep   Ordering Provider: Dr Renae  Ordering Date: 6/1/2018  Orders Status: Intial order placed and Order set placed  EP NC Contact: Paris Gaona RN    H&P:  Pt to schedule with PMD to complete  PCP: Eduardo Trent MD, [phone]    Pre-op Labs: Ordered AM of procedure    Medical Records Pertinent for Procedure:  Holter KALYAN 4-6-16 SR, Echo 6-1-18 EF 65% and EKG 5-31-18 SR@82,  5-31-18 SVT @141,  8-13-10 SVT @164    Patient Education:    Teach with Patient: Completed while in patient with EP NC and NP    Risks Reviewed:        Cardiac Catheter Ablation    <1% risk for the following: hypotension, hemorrhage, vascular injury including perforation of vein, artery or heart, thrombophlebitis, systemic or pulmonic emboli; cardiac perforation, (tamponade), infection, pneumothorax, arrhythmias, proarrhythmic effects of drugs, radiation exposure.    1-2% complete heart block (for AVNRT or septol accessory pathway).    <0.5% CVA or MI    <0.1% death    If external defibrillation is needed, 75% risk for superficial burn.    1-2% tamponade and aortic puncture with left sided transeptal approach for left side HILARIO  - increase risk of CVA to <2%.    Late arrhythmia recurrences depends upon the primary rhythm disturbance.      Consent: Will be obtained in [facility] day of procedure    Pre-Procedure Instructions that were Reviewed with Patient:  NPO after midnight, Remove all jewelry prior to coming in for procedure, Shower prior to arrival, Notified patient of time and date of procedure by CV , Transportation arrangements needed s/p procedure, Post-procedure follow up process, Sedation plan/orders and Pre-procedure letter was sent to pt by CV     Pre-Procedure Medication Instructions:  Instructions given to pt regarding anticoagulants: Pt is not on an anticoagulant- N/A  Instructions given to pt regarding antiarrhythmic medication: Flecainide/Metoprolol; Pt instructed to hold 3 days prior to procedure  Instructions for medication, other than anticoagulants/antiarrhythmics listed above, given to pt: to take all morning medications with small sips of water, with the exception of OTC supplements and MVI    Allergies   Allergen Reactions     Cephalexin Shortness Of Breath     Erythromycin Base Rash     Penicillins      Sulfa (Sulfonamide Antibiotics) Rash     Vancomycin Itching     No current facility-administered medications for this visit.   No current outpatient prescriptions on file.    Facility-Administered Medications Ordered in Other Visits:      acetaminophen suppository 650 mg (TYLENOL), 650 mg, Rectal, Q6H PRN, SAMM Jones     acetaminophen tablet 500-1,000 mg (TYLENOL), 500-1,000 mg, Oral, Q4H PRN, Manuel Armstrong, SAMM     aspirin tablet 325 mg, 325 mg, Oral, DAILY, SAMM Jones     bisacodyl suppository 10 mg (DULCOLAX), 10 mg, Rectal, Daily PRN, SAMM Jones     calcium (as carbonate) chewable tablet 400 mg (TUMS), 400 mg, Oral, QID PRN, Leo Toro DO     cholecalciferol (vitamin D3) Tab 5,000 Units, 5,000 Units, Oral, DAILY, SAMM Jones     famotidine tablet  20 mg (PEPCID), 20 mg, Oral, DAILY, SAMM Jones     flecainide tablet 50 mg (TAMBOCOR), 50 mg, Oral, Q12H, Sascha Choi (Thomas) MD Rex     hydrALAZINE injection 10 mg (APRESOLINE), 10 mg, Intravenous, Q6H PRN, Leo Toro DO     levothyroxine tablet 25 mcg (SYNTHROID, LEVOTHROID), 25 mcg, Oral, Daily 0600, SAMM Jones, 25 mcg at 05/31/18 2012     magnesium sulfate in water 4 gram/50 mL (8 %) IVPB 4 g, 4 g, Intravenous, Once, Leo Toro DO, 4 g at 06/01/18 1020     melatonin tablet 3 mg, 3 mg, Oral, Bedtime PRN, Leo Toro DO     metoprolol succinate 24 hr tablet 100 mg (TOPROL-XL), 100 mg, Oral, QHS, Sascha Choi (Thomas) MD Rex     naloxone injection 0.2-0.4 mg (NARCAN), 0.2-0.4 mg, Intravenous, PRN **OR** naloxone injection 0.2-0.4 mg (NARCAN), 0.2-0.4 mg, Intramuscular, PRN, SAMM Jones     niacin tablet 500 mg, 500 mg, Oral, Daily with brkfst, SAMM Jones     omega 3-dha-epa-fish oil capsule 2 capsule (FISH OIL), 1,000 mg, Oral, DAILY, SAMM Jones     ondansetron injection 4 mg (ZOFRAN), 4 mg, Intravenous, Q4H PRN **OR** ondansetron tablet 8 mg (ZOFRAN), 8 mg, Oral, Q8H PRN, SAMM Jones     senna-docusate 8.6-50 mg tablet 1 tablet (PERICOLACE), 1 tablet, Oral, BID, SAMM Jones     simvastatin tablet 20 mg (ZOCOR), 20 mg, Oral, QHS, SAMM Jones, 20 mg at 05/31/18 2012     sodium chloride 0.9% 500 mL, 500 mL, Intravenous, Once, SAMM Jones     sodium chloride 0.9%, 75 mL/hr, Intravenous, Continuous, SAMM Jones, 75 mL/hr at 06/01/18 0817     traMADol tablet 50 mg (ULTRAM), 50 mg, Oral, Q8H PRN, SAMM Jones    Documentation Date:6/1/2018 12:16 PM  Paris Gaona RN

## 2021-06-19 NOTE — LETTER
Letter by Eduardo Trent MD at      Author: Eduardo Trent MD Service: -- Author Type: --    Filed:  Encounter Date: 9/12/2019 Status: (Other)         [address] D Moore  [address] [address] [place] [id number]                                  September 12, 2019       Recent Results (from the past 1008 hour(s))   Comprehensive Metabolic Panel    Collection Time: 09/11/19  7:33 AM   Result Value Ref Range    Sodium 143 136 - 145 mmol/L    Potassium 4.5 3.5 - 5.0 mmol/L    Chloride 107 98 - 107 mmol/L    CO2 25 22 - 31 mmol/L    Anion Gap, Calculation 11 5 - 18 mmol/L    Glucose 85 70 - 125 mg/dL    BUN 15 8 - 28 mg/dL    Creatinine 0.93 0.60 - 1.10 mg/dL    GFR MDRD Af Amer >60 >60 mL/min/1.73m2    GFR MDRD Non Af Amer 57 (L) >60 mL/min/1.73m2    Bilirubin, Total 0.6 0.0 - 1.0 mg/dL    Calcium 10.4 8.5 - 10.5 mg/dL    Protein, Total 6.6 6.0 - 8.0 g/dL    Albumin 3.8 3.5 - 5.0 g/dL    Alkaline Phosphatase 78 45 - 120 U/L    AST 21 0 - 40 U/L    ALT 13 0 - 45 U/L   Lipid [place] FASTING    Collection Time: 09/11/19  7:33 AM   Result Value Ref Range    Cholesterol 192 <=199 mg/dL    Triglycerides 52 <=149 mg/dL    HDL Cholesterol 102 >=50 mg/dL    LDL Calculated 80 <=129 mg/dL    Patient Fasting > 8hrs? Yes    Thyroid [place]    Collection Time: 09/11/19  7:33 AM   Result Value Ref Range    TSH 1.42 0.30 - 5.00 uIU/mL   Vitamin D, Total (25-Hydroxy)    Collection Time: 09/11/19  7:33 AM   Result Value Ref Range    Vitamin D, Total (25-Hydroxy) 62.1 30.0 - 80.0 ng/mL         Dear Maria D,    Thyroid level is normal.  Kidney function and liver function are normal.  No signs of diabetes.   Cholesterol levels are at goal range.  Sincerely,        Eduardo Trent MD

## 2021-06-19 NOTE — LETTER
Letter by Eduardo Trent MD at      Author: Eduardo Trent MD Service: -- Author Type: --    Filed:  Encounter Date: 9/12/2019 Status: (Other)         Maria D Moore  [address] [facility] [address] [address]                                  September 12, 2019       Recent Results (from the past 1008 hour(s))   Comprehensive Metabolic Panel    Collection Time: 09/11/19  7:33 AM   Result Value Ref Range    Sodium 143 136 - 145 mmol/L    Potassium 4.5 3.5 - 5.0 mmol/L    Chloride 107 98 - 107 mmol/L    CO2 25 22 - 31 mmol/L    Anion Gap, Calculation 11 5 - 18 mmol/L    Glucose 85 70 - 125 mg/dL    BUN 15 8 - 28 mg/dL    Creatinine 0.93 0.60 - 1.10 mg/dL    GFR MDRD Af Amer >60 >60 mL/min/1.73m2    GFR MDRD Non Af Amer 57 (L) >60 mL/min/1.73m2    Bilirubin, Total 0.6 0.0 - 1.0 mg/dL    Calcium 10.4 8.5 - 10.5 mg/dL    Protein, Total 6.6 6.0 - 8.0 g/dL    Albumin 3.8 3.5 - 5.0 g/dL    Alkaline Phosphatase 78 45 - 120 U/L    AST 21 0 - 40 U/L    ALT 13 0 - 45 U/L   Lipid [place] FASTING    Collection Time: 09/11/19  7:33 AM   Result Value Ref Range    Cholesterol 192 <=199 mg/dL    Triglycerides 52 <=149 mg/dL    HDL Cholesterol 102 >=50 mg/dL    LDL Calculated 80 <=129 mg/dL    Patient Fasting > 8hrs? Yes    Thyroid [place]    Collection Time: 09/11/19  7:33 AM   Result Value Ref Range    TSH 1.42 0.30 - 5.00 uIU/mL   Vitamin D, Total (25-Hydroxy)    Collection Time: 09/11/19  7:33 AM   Result Value Ref Range    Vitamin D, Total (25-Hydroxy) 62.1 30.0 - 80.0 ng/mL       Patient: Maria D Moore   MR Number: [id number]   YOB: 1934   Date of Visit: 9/12/2019     Dear Dr. Moore:    Thank you for referring Maria D Moore to me for evaluation. Below are the relevant portions of my assessment and plan of care.    If you have questions, please do not hesitate to call me. I look forward to following Maria D along with you.    Sincerely,        Eduardo Trent MD          CC  No  Recipients  Harishlul Rita, [facility]  9/12/2019  1:07 PM  Signed  Who is calling:  Patient   Reason for Call:  Patient called is requesting her lab results be faxed to her at [phone].    Writer attempted to relay message from Eduardo Trent MD regarding lab results, but caller did not allow this and again asked for the lab results to be faxed to her.  Date of last appointment with primary care:  2/5/2019  Okay to leave a detailed message: No      Catalina Holguin [facility]  9/12/2019  9:49 AM  Signed  Left message to call back for: Results  Information to relay to patient:  Phone line was busy, unable to leave vm. Please relay message below from .       Catalina Holguin [facility]  9/12/2019  9:47 AM  Signed  ----- Message from Eduardo Trent MD sent at 9/12/2019  9:04 AM CDT -----  Please inform patient that not all results are back yet.  Thyroid level is normal.  Kidney function and liver function are normal.  No signs of diabetes.  Cholesterol levels are at goal range.

## 2021-06-19 NOTE — LETTER
Letter by Radha Contreras MD at      Author: Radha Contreras MD Service: -- Author Type: --    Filed:  Encounter Date: 5/9/2019 Status: (Other)         Maria D Moore  [address] [address]      May 9, 2019      Dear Maria D,    This letter is to remind you that you will be due for your follow up appointment with Dr. Elías Contreras  . To help ensure you are in the best health possible, a regular follow-up with your cardiologist is essential.     Please call our Patient Scheduling Line at [phone] to schedule your appointment at your earliest convenience.  If you have recently scheduled an appointment, please disregard this letter.    We look forward to seeing you again. As always, we are available at the number  above for any questions or concerns you may have.      Sincerely,     The Physicians and Staff of [facility]

## 2021-06-19 NOTE — PROGRESS NOTES
ASSESSMENT/PLAN  1. Osteoporosis Senile  Patient follows with endocrinology for osteoporosis  She is due for laboratory work today  - Calcium  - Vitamin D, Total (25-Hydroxy)    2. Inflamed seborrheic keratosis  Discussed with patient the benign nature of the seborrheic keratosis  Due to irritation and rubbing from her bra she would like these frozen with liquid nitrogen today  4 separate areas on her back to on her left shoulder bra line and 2 on her right shoulder were frozen with liquid nitrogen and Q-tip  Patient tolerated procedure well without complication  She is aware of after cares  He will contact us with any further concerns    Procedure note  Liquid nitrogen with a Q-tip was used to freeze for areas on her back that are irritated by frictional rubbing from her bra  Liquid nitrogen was held with a Q-tip for roughly 3-5 seconds ×4 on each of the individual areas        SUBJECTIVE:   Chief Complaint   Patient presents with     Skin/SubQ Lesion     Check skin lesions on back, itching      Follow-up     Would like labs drawn today - order in chart as future      Maria D Moore 83 y.o. female    Current Outpatient Prescriptions   Medication Sig Dispense Refill     aspirin 325 MG tablet Take 325 mg by mouth daily.       calcium carbonate (OS-DOMONIQUE) 600 mg (1,500 mg) tablet Take 600 mg by mouth daily.       cholecalciferol, vitamin D3, 5,000 unit Tab Take by mouth. Take 1 tablet daily.       ciprofloxacin HCl (CIPRO) 500 MG tablet Take 2 tabs 1-2 hours prior to dental procedure 10 tablet 0     hydrocortisone 0.5 % cream Apply to affected area twice a day. (Patient taking differently: Apply topically 2 (two) times a day as needed. Apply to affected area twice a day.) 30 g 0     levothyroxine (SYNTHROID, LEVOTHROID) 25 MCG tablet Take 1 tablet (25 mcg total) by mouth Daily at 6:00 am. 90 tablet 2     metoprolol succinate (TOPROL-XL) 50 MG 24 hr tablet Take 1 tablet (50 mg total) by mouth at bedtime. 90 tablet 3      mupirocin (BACTROBAN) 2 % ointment Apply topically 3 (three) times a day as needed.       niacin 500 MG tablet Take 500 mg by mouth daily with breakfast.       OMEGA-3/DHA/EPA/FISH OIL (FISH OIL-OMEGA-3 FATTY ACIDS) 300-1,000 mg capsule Take 2,000 mg by mouth daily.        simvastatin (ZOCOR) 20 MG tablet TAKE 1 TABLET EVERY DAY AT BEDTIME 90 tablet 3     sodium fluoride-pot nitrate (PREVIDENT 5000 ENAMEL PROTECT) 1.1-5 % Pste Apply to teeth.       traMADol (ULTRAM) 50 mg tablet Take 1 tablet (50 mg total) by mouth every 8 (eight) hours as needed for pain. 90 tablet 0     triamcinolone (KENALOG) 0.1 % cream Apply topically 2 (two) times a day. Apply ointment to affected areas twice daily until rash is gone. (Patient taking differently: Apply topically 2 (two) times a day as needed. Apply ointment to affected areas twice daily until rash is gone.) 60 g 0     VIT C/E/ZN/COPPR/LUTEIN/ZEAXAN (PRESERVISION AREDS 2 ORAL) Take 1 tablet by mouth 2 (two) times a day.       Current Facility-Administered Medications   Medication Dose Route Frequency Provider Last Rate Last Dose     denosumab 60 mg (PROLIA 60 mg/ml)  60 mg Subcutaneous Q6 Months Deisy Mckeon NP   60 mg at 03/02/18 1544     Allergies: Cephalexin; Erythromycin base; Penicillins; Sulfa (sulfonamide antibiotics); and Vancomycin   No LMP recorded. Patient has had a hysterectomy.    HPI:   Patient is here to obtain lab work for endocrinology as well as have some areas in her back frozen with liquid nitrogen.  She has multiple areas of seborrheic keratosis that are irritated due to frictional rubbing from her bra that she like to have frozen-she has had this done before with improvement.  For specific areas were frozen today with the patient tolerated the procedure well without complication.  We discussed after cares with her.  She will follow-up as previously scheduled.    She follows with endocrinology for osteoporosis and is due for laboratory work  "today.    ROS: negative except as per HPI    OBJECTIVE:   The patient appears well, alert, oriented x 3, in no distress.  /72 (Patient Site: Left Arm, Patient Position: Sitting, Cuff Size: Adult Regular)  Pulse 64  Temp 96.8  F (36  C) (Oral)   Resp 14  Ht 5' 0.24\" (1.53 m)  Wt 125 lb 3.2 oz (56.8 kg)  BMI 24.26 kg/m2    Lungs: clear, good air entry, no wheezes, rhonchi or rales.   Cardiac: S1 and S2 normal, no murmurs, regular rate and rhythm.   Extremities: show no edema, normal peripheral pulses.   Neurological: normal, no focal findings.  Skin: Multiple seborrheic keratosis located and scattered throughout the patient's back there are for specific that are inflamed and irritated due to frictional rubbing of her bra  Psych- normal mood and affect      Pt states an understanding and agrees to the above plan.    "

## 2021-06-20 NOTE — LETTER
Letter by Flex Bain MD at      Author: Flex Bain MD Service: -- Author Type: --    Filed:  Encounter Date: 6/30/2020 Status: (Other)         Maria D Moore  [address]  [facility] [address]             June 30, 2020         Dear Ms. Moore,    Below are the results from your recent visit:    Resulted Orders   Electrocardiogram Perform and Read   Result Value Ref Range    SYSTOLIC BLOOD PRESSURE      DIASTOLIC BLOOD PRESSURE      VENTRICULAR RATE 57 BPM    ATRIAL RATE 57 BPM    P-R INTERVAL 132 ms    QRS DURATION 74 ms    Q-T INTERVAL 446 ms    QTC CALCULATION (BEZET) 434 ms    P Axis 38 degrees    R AXIS -21 degrees    T AXIS -2 degrees    MUSE DIAGNOSIS       Sinus bradycardia  Low voltage QRS  Borderline ECG  When compared with ECG of 31-MAY-2018 12:25,  No significant change was found  Confirmed by BABS YANG MD LOC:SJ ([id number]) on 6/29/2020 3:33:58 PM          EKG and the rest of the lab appear to be fairly normal     Please call with questions or contact us using [employer].    Sincerely,        Electronically signed by Flex Bain MD

## 2021-06-20 NOTE — LETTER
Letter by Flex Bain MD at      Author: Flex Bain MD Service: -- Author Type: --    Filed:  Encounter Date: 7/2/2020 Status: (Other)         [facility] [address] [facility]  [address] [place] [id number]             July 2, 2020         Dear Ms. Moore,    Below are the results from your recent visit:    Resulted Orders   Thyroid Cascade   Result Value Ref Range    TSH 1.24 0.30 - 5.00 uIU/mL   Vitamin D, Total (25-Hydroxy)   Result Value Ref Range    Vitamin D, Total (25-Hydroxy) 59.1 30.0 - 80.0 ng/mL    Narrative    Deficiency <10.0 ng/mL  Insufficiency 10.0-29.9 ng/mL  Sufficiency 30.0-80.0 ng/mL  Toxicity (possible) >100.0 ng/mL         Vitamin D and TSH are normal    Please call with questions or contact us using [employer].    Sincerely,        Electronically signed by Flex Bain MD

## 2021-06-20 NOTE — LETTER
Letter by Deisy Mckeon NP at      Author: Deisy Mckeon NP Service: -- Author Type: --    Filed:  Encounter Date: 9/16/2020 Status: (Other)         Maria D Moore  [address]  [facility] [address]             September 16, 2020         Dear Ms. Moore,    Below are the results from your recent visit:    Resulted Orders   DXA Bone Density Scan    Narrative    9/14/2020      RE: Maria D Moore  YOB: 1934        Dear Deisy Mckeon,    Patient Profile:  85 y.o. female, postmenopausal, is here for the follow up bone density   test.   History of fractures - Foot. Family history of osteoporosis - Unknown.    Family history of hip fracture: None. Smoking history - No. Osteoporosis   treatment past -  Yes;  HRT, Bisphosphonates and Forteo. Osteoporosis   treatment current - Yes;  Prolia.  Chronic medical problems - Hysterectomy   and Oophorectomy. High risk medications -  None.      Assessment:    1. The spine bone density L1-L2 with T-score -0.2.  2. Femoral bone densities show left total hip T- score -1.6 and right   femoral neck T-score -1.5.  3. Trabecular bone score indicates good trabecular bone architecture.  4.  The left distal radius T score -3.2.    85 y.o. female with OSTEOPOROSIS and HIGH fracture risk, adjusted for the   TBS, with major osteoporotic fracture risk 11.5 % and hip fracture risk   3.7 %.     Since the previous bone density dated  August 31, 2018, there has been a     +5.7 % change in the bone density of the spine.  Additionally there has   been no statistically significant % change in the hips bilaterally and   left distal radius.  An increase in and/or stability is considered a   positive response to treatment.    Recommendations:  A continuation of the current treatment is recommended with follow up bone   density scan in 2 years.      Bone densitometry was performed on your patient using our [employer]   densitometer. The results are summarized and  a copy of the actual scans   are included for your review. In conformity with the International Society   of Clinical Densitometry's most recent position statement for DXA   interpretation (2015), the diagnosis will be made on the lowest measured   T-score of the lumbar spine, femoral neck, total proximal femur or 33%   radius. Note the change in terminology for diagnostic classification from   OSTEOPENIA to LOW BONE MASS. All trending for sequential exams will be   done using multiple vertebrae or the total proximal femur. Fracture risk   is based on the WHO Fracture Risk Assessment Tool (FRAX). If additional   information is needed or if you would like to discuss the results, please   do not hesitate to call me.       Thank you for referring this patient to [facility].   We are happy to be of service in support of you and your practice. If you   have any questions or suggestions to improve our service, please call me   at [phone].     Sincerely,     Shaista Caruso M.D. C.C.CONSUELO.  Osteoporosis Services, [facility]     The test results show that your current treatment is working. Please continue your current medication and plan. We recommend that you repeat the above test(s) in 2 years.    Please call with questions or contact us using [email or web address].    Sincerely,        Electronically signed by Deisy Mckeon NP

## 2021-06-20 NOTE — PROGRESS NOTES
[facility]  ENDOCRINOLOGY    Osteoporosis Follow Up 9/13/2018    Maria D Moore, 11/24/1934, [id number]          Reason for visit      1. Osteoporosis Senile    2. H/O total knee replacement, unspecified laterality        History     Maria D Moore is a very pleasant 83 y.o. old female who presents for follow up.   SUMMARY:  As you know, she took a fall when she became syncopal with atrial fibrillation on August 13th, 2010. Initially plain radiographs were negative but with persistent pain. A subsequent CT scan on September 26th, 2010, did show the sacral insufficiency fractures both sacral wings extending into the S2 sacral body and nondisplaced fracture of the right pubic bone. MRI in September 2010 confirmed these findings. A followup CT done on March 9 2011, indicates again a broad based bilateral sacral insufficiency fractures and ununited fracture of the right pubic bone. She is here for further recommendations. I should note that at the time she was hospitalized with  her fractures, she was told that her vitamin D level was low and she was treated with 95240 International Units of vitamin D weekly for three months. Subsequently, her level chuck just above 50 ng/mL has had serious  pelvic fractures while on bisphosphonates for at least six years. A 78-year-old woman with severe osteoporosis who has completed 2 years of teriparatide.  We discussed options for following with antiresorptive agent and I do think that denosumab would be preferable for her and she was in agreement with this plan.    TODAY:  Maria D returns today in f/u for Osteoporosis.  She has been getting Prolia injections without difficulty or reaction.  She feels that she is doing well.  She has recently had a Dexa Scan taken and it shows an increase in her bone density.  This scan was compared to one done in 2010 because it was the most recent done on the same machine. Her current Vit D level is 55.2, and Calcium level is 9.5. Both are  "within normal limits.  She continues to walk as much as she is able. She is due for an injection today.     Risk Factors     The following high- risk conditions have been ruled out: celiac disease, eating disorders, gastric bypass, hyperparathyroidism, inflammatory bowel disease, hyperthyroidism, rheumatoid arthritis, lupus, chronic kidney disease.     Maria D Moore has the following risk factors: Age, Female gender,  and Low BMI     She is not on high risk medications such as glucocorticoids, anti-coagulants, anti-convulsants, chemotherapy or levothyroxine.    Patient deniesHysterectomy, Oophrectomy, Breast cancer and Family history of breast cancer.      Past Medical History     Patient Active Problem List   Diagnosis     Fatigue     Temperature Intolerance To Cold   (Consistent)     Abnormal Blood Chemistry     Osteoarthritis Of The Ankle/Foot (Multiple Joints)     Mixed hyperlipidemia     Furuncle     Generalized Osteoarthritis Of Multiple Sites     Edema     Osteoporosis Senile     Vaginal Discharge (Symptom)     Back Pain     Automatic Atrial Tachycardia     Lightheadedness     SVT (supraventricular tachycardia) (H)     Hypotension, unspecified hypotension type       Family History       family history includes Stroke in her father.    Social History      reports that she has never smoked. She has never used smokeless tobacco. She reports that she does not drink alcohol or use illicit drugs.      Review of Systems     Patient denies current pain, limited mobility, fractures.   Remainder per HPI.      Vital Signs     /70 (Patient Site: Right Arm, Patient Position: Sitting, Cuff Size: Adult Regular)  Pulse 78  Ht 5' 0.25\" (1.53 m)  Wt 127 lb (57.6 kg)  BMI 24.6 kg/m2    Physical Exam     GENERAL:  Normal, NIRD  EYES:  Pupils equal, round and reactive to light; no proptosis, lid lag or  periorbital edema.  THYROID:  Thyroid is normal.  No tenderness or bruit  NECK: No lymph " nodes  MUSCULOSKELETAL: No joint abnormalities, FROM in all four extremities. No kyphosis. Muscle strength grossly normal without evidence of wasting.  HEART:  Regular rate and rhythm without murmur.  LUNGS:  Clear to auscultation.  ABDOMEN:Soft, non-tender, no masses or organomegaly  NEURO:  Patella Reflexes were normal.No tremors  SKIN:  No acanthosis nigricans or vitiligo        Assessment     1. Osteoporosis Senile    2. H/O total knee replacement, unspecified laterality        Plan     Pt has a current infection in her L great toe. I will start her on an antibiotic.  She will get her next Prolia today and will get the next in 6 months.  She will f/u with me in 1 year.      Total visit minutes:25  Time spent counseling and coordination of care:23    Deisy Mckeon   Endocrinology  9/13/2018  2:16 PM      Current Medications     Outpatient Medications Prior to Visit   Medication Sig Dispense Refill     aspirin 325 MG tablet Take 325 mg by mouth daily.       calcium carbonate (OS-DOMONIQUE) 600 mg (1,500 mg) tablet Take 600 mg by mouth daily.       cholecalciferol, vitamin D3, 5,000 unit Tab Take by mouth. Take 1 tablet daily.       hydrocortisone 0.5 % cream Apply to affected area twice a day. (Patient taking differently: Apply topically 2 (two) times a day as needed. Apply to affected area twice a day.) 30 g 0     levothyroxine (SYNTHROID, LEVOTHROID) 25 MCG tablet Take 1 tablet (25 mcg total) by mouth Daily at 6:00 am. 90 tablet 2     metoprolol succinate (TOPROL-XL) 50 MG 24 hr tablet Take 1 tablet (50 mg total) by mouth at bedtime. 90 tablet 3     mupirocin (BACTROBAN) 2 % ointment Apply topically 3 (three) times a day as needed.       niacin 500 MG tablet Take 500 mg by mouth daily with breakfast.       OMEGA-3/DHA/EPA/FISH OIL (FISH OIL-OMEGA-3 FATTY ACIDS) 300-1,000 mg capsule Take 2,000 mg by mouth daily.        simvastatin (ZOCOR) 20 MG tablet TAKE 1 TABLET EVERY DAY AT BEDTIME 90 tablet 3     sodium  fluoride-pot nitrate (PREVIDENT 5000 ENAMEL PROTECT) 1.1-5 % Pste Apply to teeth.       traMADol (ULTRAM) 50 mg tablet Take 1 tablet (50 mg total) by mouth every 8 (eight) hours as needed for pain. 90 tablet 0     triamcinolone (KENALOG) 0.1 % cream Apply topically 2 (two) times a day. Apply ointment to affected areas twice daily until rash is gone. (Patient taking differently: Apply topically 2 (two) times a day as needed. Apply ointment to affected areas twice daily until rash is gone.) 60 g 0     VIT C/E/ZN/COPPR/LUTEIN/ZEAXAN (PRESERVISION AREDS 2 ORAL) Take 1 tablet by mouth 2 (two) times a day.       ciprofloxacin HCl (CIPRO) 500 MG tablet Take 2 tabs 1-2 hours prior to dental procedure 10 tablet 0     Facility-Administered Medications Prior to Visit   Medication Dose Route Frequency Provider Last Rate Last Dose     denosumab 60 mg (PROLIA 60 mg/ml)  60 mg Subcutaneous Q6 Months Deisy Mckeon, NP   60 mg at 09/11/18 0812         Lab Results     TSH   Date Value Ref Range Status   07/09/2018 1.79 0.30 - 5.00 uIU/mL Final     PTH   Date Value Ref Range Status   07/26/2016 34 10 - 86 pg/mL Final     Calcium   Date Value Ref Range Status   08/14/2018 9.5 8.5 - 10.5 mg/dL Final     Phosphorus   Date Value Ref Range Status   07/26/2016 3.8 2.5 - 4.5 mg/dL Final           Imaging Results   Last DEXA scan:  Results for orders placed in visit on 06/27/18   DXA Bone Density Scan    Narrative 8/31/2018      RE: Maria D Moore  YOB: 1934        Dear Deisy Mckeon,    Patient Profile:  83 y.o. female, postmenopausal, is here for the follow up bone density   test.   History of fractures - Yes; Foot and tailbone area. Family history of   osteoporosis - None.  Family history of hip fracture: None. Smoking   history - No. Osteoporosis treatment past -  Yes;  Bisphosphonates and   Forteo. Osteoporosis treatment current - Yes;  Prolia.  Chronic medical   problems - Hysterectomy and Oophorectomy. High risk  medications -    Thyroid;  Yes, Currently (Just started).      Assessment:    1. The spine bone density L1-L2 with T-score -0.8.  Of note, sclerotic   changes are noted in the lower lumbar vertebrae, which may artifactually   elevate the bone mineral density.  2. Femoral bone densities show left femoral neck T- score -1.6 and right   total hip T-score -1.4.  3. Trabecular bone score indicates good trabecular bone architecture.  4.  Left one third radius T score -3.4.    83 y.o. female with OSTEOPOROSIS and HIGH fracture risk.     Of note, the previous bone densitometry is from 2016 and 2014 were done at   a different facility.  Therefore, those results are not directly   comparable.  However, a previous bone density dated  July 20, 2010 was   done on this scanner. There has been a   7.3 % increase in the bone   density of the spine.  Additionally there has been a 5.0% increase in the   left total hip and no statistically significant  change in the right total   hip.    Recommendations:  Continuation of treatment for osteoporosis is recommended with follow up   bone density scan in 2 years.      Bone densitometry was performed on your patient using our [employer]   densitometer. The results are summarized and a copy of the actual scans   are included for your review. In conformity with the International Society   of Clinical Densitometry's most recent position statement for DXA   interpretation (2015), the diagnosis will be made on the lowest measured   T-score of the lumbar spine, femoral neck, total proximal femur or 33%   radius. Note the change in terminology for diagnostic classification from   OSTEOPENIA to LOW BONE MASS. All trending for sequential exams will be   done using multiple vertebrae or the total proximal femur. Fracture risk   is based on the WHO Fracture Risk Assessment Tool (FRAX). If additional   information is needed or if you would like to discuss the results, please   do not hesitate to  call me.       Thank you for referring this patient to [facility].   We are happy to be of service in support of you and your practice. If you   have any questions or suggestions to improve our service, please call me   at [phone].     Sincerely,     DIONY Mckee.  Osteoporosis Services, [facility]

## 2021-06-20 NOTE — LETTER
Letter by Flex Bain MD at      Author: Flex Bain MD Service: -- Author Type: --    Filed:  Encounter Date: 6/29/2020 Status: (Other)         [address] D Moore  [address] [facility] [place] [address] [place] [id number]             June 29, 2020         Dear Ms. Moore,    Below are the results from your recent visit:    Resulted Orders   Comprehensive Metabolic Panel   Result Value Ref Range    Sodium 140 136 - 145 mmol/L    Potassium 4.5 3.5 - 5.0 mmol/L    Chloride 107 98 - 107 mmol/L    CO2 20 (L) 22 - 31 mmol/L    Anion Gap, Calculation 13 5 - 18 mmol/L    Glucose 82 70 - 125 mg/dL    BUN 16 8 - 28 mg/dL    Creatinine 0.85 0.60 - 1.10 mg/dL    GFR MDRD Af Amer >60 >60 mL/min/1.73m2    GFR MDRD Non Af Amer >60 >60 mL/min/1.73m2    Bilirubin, Total 0.5 0.0 - 1.0 mg/dL    Calcium 8.9 8.5 - 10.5 mg/dL    Protein, Total 6.5 6.0 - 8.0 g/dL    Albumin 3.9 3.5 - 5.0 g/dL    Alkaline Phosphatase 82 45 - 120 U/L    AST 21 0 - 40 U/L    ALT 13 0 - 45 U/L    Narrative    Fasting Glucose reference range is 70-99 mg/dL per  American Diabetes Association (ADA) guidelines.   HM1 (CBC with Diff)   Result Value Ref Range    WBC 6.6 4.0 - 11.0 thou/uL    RBC 4.14 3.80 - 5.40 mill/uL    Hemoglobin 13.6 12.0 - 16.0 g/dL    Hematocrit 39.0 35.0 - 47.0 %    MCV 94 80 - 100 fL    MCH 32.7 27.0 - 34.0 pg    MCHC 34.8 32.0 - 36.0 g/dL    RDW 11.9 11.0 - 14.5 %    Platelets 216 140 - 440 thou/uL    MPV 7.9 7.0 - 10.0 fL    Neutrophils % 59 50 - 70 %    Lymphocytes % 31 20 - 40 %    Monocytes % 6 2 - 10 %    Eosinophils % 4 0 - 6 %    Basophils % 1 0 - 2 %    Neutrophils Absolute 3.9 2.0 - 7.7 thou/uL    Lymphocytes Absolute 2.0 0.8 - 4.4 thou/uL    Monocytes Absolute 0.4 0.0 - 0.9 thou/uL    Eosinophils Absolute 0.3 0.0 - 0.4 thou/uL    Basophils Absolute 0.1 0.0 - 0.2 thou/uL   Drug Abuse 1+, Urine   Result Value Ref Range    Amphetamines Screen Negative Screen Negative    Benzodiazepines Screen Negative Screen Negative    Opiates  Screen Negative Screen Negative    Phencyclidine Screen Negative Screen Negative    THC Screen Negative Screen Negative    Barbiturates Screen Negative Screen Negative    Cocaine Metabolite Screen Negative Screen Negative    Methadone Screen Negative Screen Negative    Oxycodone Screen Negative Screen Negative    Creatinine, Urine 71.4 mg/dL    Narrative    Drug                           Screening Threshold    Amphetamines                    1000 ng/mL  Benzodiazepine                   200 ng/mL  Opiates                          300 ng/mL  Phencyclidine                     25 ng/mL  THC Metabolite                    50 ng/mL  Barbiturates                     200 ng/mL  Cocaine Metabolite               150 ng/mL  Methadone                        300 ng/mL  Oxycodone                        100 ng/mL    Screening results are to be used only for medical purposes.  Unconfirmed screening results are not to be used for non-  medical purposes.       These labs are fairly normal.  EKG is also normal.    Please call with questions or contact us using [employer].    Sincerely,        Electronically signed by Flex Bain MD

## 2021-06-20 NOTE — LETTER
Letter by Flex Bain MD at      Author: Flex Bain MD Service: -- Author Type: --    Filed:  Encounter Date: 6/29/2020 Status: (Other)         [facility] MEDICINE/OB  06/29/20    Patient: Maria D Moore  YOB: 1934  Medical Record Number: [id number]                                                                  Opioid / Opioid Plus Controlled Substance Agreement    I understand that my care provider has prescribed an opioid (narcotic) controlled substance to help manage my condition(s). I am taking this medicine to help me function or work. I know this is strong medicine, and that it can cause serious side effects. Opioid medicine can be sedating, addicting and may cause a dependency on the drug. They can affect my ability to drive or think, and cause depression. They need to be taken exactly as prescribed. Combining opioids with certain medicines or chemicals (such as cocaine, sedatives and tranquilizers, sleeping pills, meth) can be dangerous or even fatal. Also, if I stop opioids suddenly, I may have severe withdrawal symptoms. Last, I understand that opioids do not work for all types of pain nor for all patients. If not helpful, I may be asked to stop them.        The risks, benefits, and side effects of these medicine(s) were explained to me. I agree that:    1. I will take part in other treatments as advised by my care team. This may be psychiatry or counseling, physical therapy, behavioral therapy, group treatment or a referral to a pain clinic. I will reduce or stop my medicine when my care team tells me to do so.  2. I will take my medicines as prescribed. I will not change the dose or schedule unless my care team tells me to. There will be no refills if I run out early.  I may be contactedwithout warning and asked to complete a urine drug test or pill count at any time.   3. I will keep all my appointments, and understand this is part of the monitoring of  opioids. My care team may require an office visit for EVERY opioid/controlled substance refill. If I miss appointments or dont follow instructions, my care team may stop my medicine.  4. I will not ask other providers to prescribe controlled substances, and I will not accept controlled substances from other people. If I need another prescribed controlled substance for a new reason, I will tell my care team within 1 business day.  5. I will use one pharmacy to fill all of my controlled substance prescriptions, and it is up to me to make sure that I do not run out of my medicines on weekends or holidays. If my care team is willing to refill my opioid prescription without a visit, I must request refills only during office hours, refills may take up to 3 days to process, and it may take up to 5 to 7 days for my medicine to be mailed and ready at my pharmacy. Prescriptions will not be mailed anywhere except my pharmacy.        [id number]  Rev 12/18         Registration to scan to EHR                             Page 1 of 2               Controlled Substance Agreement Opioid        [facility] MEDICINE/OB  06/29/20  Patient: Maria D Moore  YOB: 1934  Medical Record Number: [id number]                                                                  6. I am responsible for my prescriptions. If the medicine/prescription is lost or stolen, it will not be replaced. I also agree not to share controlled substance medicines with anyone.  7. I agree to not use ANY illegal or recreational drugs. This includes marijuana, cocaine, bath salts or other drugs. I agree not to use alcohol unless my care team says I may.          I agree to give urine samples whenever asked. If I dont give a urine sample, the care team may stop my medicine.    8. If I enroll in the [place] Medical Marijuana program, I will tell my care team. I will also sign an agreement to share my medical records with my care team.   9. I will  bring in my list of medicines (or my medicine bottles) each time I come to the clinic.   10. I will tell my care team right away if I become pregnant or have a new medical problem treated outside of my regular clinic.  11. I understand that this medicine can affect my thinking and judgment. It may be unsafe for me to drive, use machinery and do dangerous tasks. I will not do any of these things until I know how the medicine affects me. If my dose changes, I will wait to see how it affects me. I will contact my care team if I have concerns about medicine side effects.    I understand that if I do not follow any of the conditions above, my prescriptions or treatment may be stopped.      I agree that my provider, clinic care team, and pharmacy may work with any city, state or federal law enforcement agency that investigates the misuse, sale, or other diversion of my controlled medicine. I will allow my provider to discuss my care with or share a copy of this agreement with any other treating provider, pharmacy or emergency room where I receive care. I agree to give up (waive) any right of privacy or confidentiality with respect to these consents.     I have read this agreement and have asked questions about anything I did not understand.      ________________________________________________________________________  Patient signature - Date/Time -  Maria D Moore                                      ________________________________________________________________________  Witness signature                                                            ________________________________________________________________________  Provider signature - Flex Bain MD      [id number]  Rev 12/18         Registration to scan to EHR                         Page 2 of 2                   Controlled Substance Agreement Opioid           Page 1 of 2  Opioid Pain Medicines (also known as Narcotics)  What You Need to Know    What are  opioids?   Opioids are pain medicines that must be prescribed by a doctor.  They are also known as narcotics.    Examples are:     morphine (MS Contin, Elana)    oxycodone (Oxycontin)    oxycodone and acetaminophen (Percocet)    hydrocodone and acetaminophen (Vicodin, Norco)     fentanyl patch (Duragesic)     hydromorphone (Dilaudid)     methadone     What do opioids do well?   Opioids are best for short-term pain after a surgery or injury. They also work well for cancer pain. Unlike other pain medicines, they do not cause liver or kidney failure or ulcers. They may help some people with long-lasting (chronic) pain.     What do opioids NOT do well?   Opioids never get rid of pain entirely, and they do not work well for most patients with chronic pain. Opioids do not reduce swelling, one of the causes of pain. They also dont work well for nerve pain.                           For informational purposes only.  Not to replace the advice of your care provider.  Copyright [address] [facility]. All right reserved. [employer] 717295-Usn 02/18.      Page 2 of 2    Risks and side effects   Talk to your doctor before you start or decide to keep taking one of these medicines. Side effects include:    Lowering your breathing rate enough to cause death    Overdose, including death, especially if taking higher than prescribed doses    Long-term opioid use    Worse depression symptoms; less pleasure in things you usually enjoy    Feeling tired or sluggish    Slower thoughts or cloudy thinking    Being more sensitive to pain over time; pain is harder to control    Trouble sleeping or restless sleep    Changes in hormone levels (for example, less testosterone)    Changes in sex drive or ability to have sex    Constipation    Unsafe driving    Itching and sweating    Feeling dizzy    Nausea, vomiting and dry mouth    What else should I know about opioids?  When someone takes opioids for too long or too often, they become  dependent. This means that if you stop or reduce the medicine too quickly, you will have withdrawal symptoms.    Dependence is not the same as addiction. Addiction is when people keep using a substance that harms their body, their mind or their relations with others. If you have a history of drug or alcohol abuse, taking opioids can cause a relapse.    Over time, opioids dont work as well. Most people will need higher and higher doses. The higher the dose, the more serious the side effects. We dont know the long-term effects of opioids.      Prescribed opioids aren't the best way to manage chronic pain    Other ways to manage pain include:      Ibuprofen or acetaminophen.  You should always try this first.      Treat health problems that may be causing pain.      acupuncture or massage, deep breathing, meditation, visual imagery, aromatherapy.      Use heat or ice at the pain site      Physical therapy and exercise      Stop smoking      See a counselor or therapist                                                  People who have used opioids for a long time may have a lower quality of life, worse depression, higher levels of pain and more visits to doctors.    Never share your opioids with others. Be sure to store opioids in a secure place, locked if possible.Young children can easily swallow them and overdose.     You can overdose on opioids.  Signs of overdose include decrease or loss of consciousness, slowed breathing, trouble waking and blue lips.  If someone is worried about overdose, they should call 911.    If you are at risk for overdose, you may get naloxone (Narcan, a medicine that reverses the effects of opioids.  If you overdose, a friend or family member can give you Narcan while waiting for the ambulance.  They need to know the signs of overdose and how to give Narcan.    While you're taking opioids:    Don't use alcohol or street drugs. Taking them together can cause death.    Don't take any of these  medicines unless your doctor says its okay.  Taking these with opioids can cause death.    Benzodiazepines (such as lorazepam         or diazepam)    Muscle relaxers (such as cyclobenzaprine)    sleeping pills    other opioids    Safe disposal of opioids  Find your area drug take-back program, your pharmacy mail-back program, buy a special disposal bag (such as Deterra) from your pharmacy or flush them down the toilet.  Use the guidelines at:  www.fda.gov/drugs/resourcesforyou

## 2021-06-21 NOTE — LETTER
Letter by Zachary Hernandez PA-C at      Author: Zachary Hernandez PA-C Service: -- Author Type: --    Filed:  Encounter Date: 2/15/2021 Status: (Other)         February 15, 2021     Patient: Maria D Moore   YOB: 1934   Date of Visit: 2/15/2021       To Whom It May Concern:      I have been following Maria D  while she has been in the hospital.  Due to decreased appetite and decreased food intake please allow her meal payment requirements to be deferred for 6 weeks.    Sincerely,        Electronically signed by Zachary Hernandez PA-C

## 2021-06-21 NOTE — LETTER
Letter by Clinton Pavon MD at      Author: Clinton Pavon MD Service: -- Author Type: --    Filed:  Encounter Date: 2/22/2021 Status: (Other)         February 22, 2021     Patient: Maria D Moore   YOB: 1934   Date of Visit: 2/22/2021       To Whom It May Concern:    It is my medical opinion that Maria D Moore should remain out of work until March 8, 2021.    If you have any questions or concerns, please don't hesitate to call.    Sincerely,        Electronically signed by Clinton Pavon MD

## 2021-06-22 ENCOUNTER — COMMUNICATION - HEALTHEAST (OUTPATIENT)
Dept: ADMINISTRATIVE | Facility: CLINIC | Age: 86
End: 2021-06-22

## 2021-06-23 NOTE — TELEPHONE ENCOUNTER
Called to check on status of PA, this was delayed due to it being originally denied (question set was sent after hours last Friday).  It was denied on Saturday.     Per rep's request faxed appeal information and question set again to [phone]  Marked urgent

## 2021-06-23 NOTE — TELEPHONE ENCOUNTER
Please inform patient that I do not recommend sending controlled substances to mail order systems- I would recommend she have it sent to a local pharmacy.  Dr. Trent

## 2021-06-23 NOTE — TELEPHONE ENCOUNTER
Additional questions received, answered and faxed back to [phone]    Will await determination

## 2021-06-23 NOTE — TELEPHONE ENCOUNTER
Refill Approved    Rx renewed per Medication Renewal Policy. Medication was last renewed on 12/12/17.    Teagan Mohr, [facility] Triage/Med Refill 1/16/2019     Requested Prescriptions   Pending Prescriptions Disp Refills     simvastatin (ZOCOR) 20 MG tablet [Pharmacy Med Name: SIMVASTATIN TABS 20MG] 90 tablet 3     Sig: TAKE 1 TABLET DAILY AT BEDTIME    Statins Refill Protocol (Hmg CoA Reductase Inhibitors) Passed - 1/15/2019  8:11 PM       Passed - PCP or prescribing provider visit in past 12 months     Last office visit with prescriber/PCP: 8/14/2018 Eduardo Trent MD OR same dept: 8/14/2018 Eduardo Trent MD OR same specialty: 8/14/2018 Eduardo Trent MD  Last physical: Visit date not found Last MTM visit: Visit date not found   Next visit within 3 mo: Visit date not found  Next physical within 3 mo: Visit date not found  Prescriber OR PCP: Eduardo Trent MD  Last diagnosis associated with med order: 1. Hyperlipidemia  - simvastatin (ZOCOR) 20 MG tablet [Pharmacy Med Name: SIMVASTATIN TABS 20MG]; TAKE 1 TABLET DAILY AT BEDTIME  Dispense: 90 tablet; Refill: 3    If protocol passes may refill for 12 months if within 3 months of last provider visit (or a total of 15 months).

## 2021-06-23 NOTE — TELEPHONE ENCOUNTER
Central PA team  [phone]    PA has been initiated.     Sent urgent request to [facility] [phone]

## 2021-06-23 NOTE — TELEPHONE ENCOUNTER
Prior Authorization Request  Who s requesting:  Pharmacy  Pharmacy Name and Location: [id number], [place] [address]  Medication Name: traMADol (ULTRAM) 50 mg tablet  Insurance Plan: [facility]/Medicare Part D  Insurance Member ID Number:  [id number]  Informed patient that prior authorizations can take up to 10 business days for response:   Yes  Okay to leave a detailed message: Yes    This prior authorization requires a quantity exception. Insurance only 7 days out of a 30 day prescription. Questions please call insurance @ [phone].

## 2021-06-23 NOTE — TELEPHONE ENCOUNTER
Controlled Substance Refill Request  Medication Name:   Requested Prescriptions     Pending Prescriptions Disp Refills     traMADol (ULTRAM) 50 mg tablet 90 tablet 0     Sig: Take 1 tablet (50 mg total) by mouth every 8 (eight) hours as needed for pain.     Date Last Fill:   5/8/2018  Pharmacy:  [facility] [address]      Submit electronically to pharmacy  Controlled Substance Agreement Date Scanned:   Encounter-Level CSA Scan Date - 10/27/2017:    Scan on 10/31/2017  2:49 PM (below)             Encounter-Level CSA Scan Date - 09/07/2016:    Scan on 9/8/2016  4:05 PM: TRAMADOL (below)             Encounter-Level CSA Scan Date - 11/17/2015:    Scan on 11/17/2015: Treatment Agreement (below)         Last office visit with prescriber/PCP: 8/14/2018 Eduardo Trent MD OR same dept: 8/14/2018 Eduardo Trent MD OR same specialty: 8/14/2018 Eduardo Trent MD  Last physical: Visit date not found Last MTM visit: Visit date not found

## 2021-06-23 NOTE — TELEPHONE ENCOUNTER
Per call from [employer], no PA needed for this medication and dosing.   Patient filled her 7 day supply of this medication.    The subsequent fills will pay out for more than a 7 days supply. Patient can get a new fill on Monday 1/28/19    Pharmacy is aware.  Per Pharmacy they will just need a new Rx for the remaining tablets, they cannot fill the remaining Rx due to new opioid rules.

## 2021-06-23 NOTE — TELEPHONE ENCOUNTER
Informed patient of providers message. Patient would like this rx sent to the [facility] pharmacy off [address]

## 2021-06-23 NOTE — TELEPHONE ENCOUNTER
FYI - Status Update  Who is Calling: Patient  Update: Questioning the status of the below request. Patient requesting a return phone call once prescription is submitted to [facility].  Okay to leave a detailed message?:  Yes

## 2021-06-23 NOTE — TELEPHONE ENCOUNTER
Patient Returning Call  Reason for call:  Returning phone call  Information relayed to patient:  Informed patient prescription was submitted to the pharmacy.  Patient has additional questions:  No  If YES, what are your questions/concerns:  No additional questions at this time.  Okay to leave a detailed message?: No call back needed   Subjective   Patient ID: Kamron is a 16 year old male who is accompanied by: mother WHO IS THE PRIMARY HISTORIAN FOR THIS VISIT  Well Child 12-18 Year    HPI   No recent illnesses    Additional concerns today: Bump on back of head    Nutrition  2% milk - minimal, cheese daily  Fruits - minimal weekly  Veggies - minimal weekly  +meat    Sleep  4 hrs/noc    Dental  Brushes 1x/day  Visit q 6 months  Needs to Floss    Elimination  BMs 2-3x/day  No Dysuria    [employer] - Trevor year - remote - doing okay    Social: walking, + chores, + can swim               HEADS screen = negative    ALLERGIES: pring allergies    Daily meds:  vitamin, prn Claritin during Spring allergy season    No chronic Medical Issues    No change in Family Medical Hx: Dad = Type 2 DM, no family H/O sudden cardiac death per Mom    Review of Systems  All reviewed and are negative aside from that listed above at this encounter    Objective   Vitals: /74 (BP Location: LUE - Left upper extremity, Patient Position: Sitting, Cuff Size: Large Adult)   Pulse 83   Temp 97.6 °F (36.4 °C) (Temporal)   Ht 5' 6.97\" (1.701 m)   Wt 66.7 kg (147 lb)   BMI 23.04 kg/m²   BSA 1.77 m²   Growth parameters are noted and are appropriate for age.    Physical Exam  Juan Francisco Stage  4  Alert/active, pink and in no distress  HEENT:   Head: Normocephalic, atraumatic    THE \"HEAD BUMP\" NOTED IN \"CONCERNS\" ABOVE = NORMAL OCCIPITAL PROTUBERANCE ON BACK OF HEAD   Eyes: SRIDHAR, EOMI, Conj & Sclerae normal   Ears: TMs and canals are normal   Throat: No erythema, edema, exudate   Nares: No discharge  Neck: no masses or thyromegaly  Chest: symmetrical and no masses  Lungs: clear & equal BS  CVS: Normal S1/S2. No murmur or femoral delay. Pulses 2+ and symmetrical  Abd: Normal BS. NO distention/masses/HSM/tenderness to palpation  : normal for age Testes descended marge. No hernia  Back: no scoliosis or CVA tenderness  Extr: Normal and symmetrical  Neuro: Normal and symmetrical  tone. + 10 Single Leg Hops/+\"Duck Walk\"  Skin: No rash    Assessment   Healthy appearing 15 yo male  Normal growth and development  Nutrition risk  Elevated glucose    Counseling  Nutrition/Weight Management:  Assessment and discussion of current Nutrition behaviors performed:  Done  Assessment and discussion of current Physical Activity behaviors performed: Done    PLAN:  Safety, PROPER DIET/exercise for age, PROPER dental care and avoidance of infection ideas reviewed.  Immunizations: per orders. Mom declined Flu vaccine. HPV info given, reviewed and HIGHLY recommended  History of previous adverse reactions to immunizations? no  Immunizations given today: yes  Office Hgb and Lipid screen = normal EXCEPT for elevated glucose of 140.   Will have pt RTC for fasting CMP and HgbA1C this week  Follow-up yearly for a well visit, or sooner as needed.    ADDENDUM:  Tried to call only phone # listed for family - disconnected. Will mail a letter.   37.3

## 2021-06-23 NOTE — TELEPHONE ENCOUNTER
FYI - Status Update  Who is Calling: Patient  Update: Per the request of patient, the below prescription was canceled at [facility], and re-faxed to [facility].  Okay to leave a detailed message?:  No return call needed      Medication Detail      Disp Refills Start End    levothyroxine (SYNTHROID, LEVOTHROID) 25 MCG tablet 90 tablet 2 1/16/2019     Sig - Route: Take 1 tablet (25 mcg total) by mouth Daily at 6:00 am. - Oral    Sent to pharmacy as: levothyroxine (SYNTHROID, LEVOTHROID) 25 MCG tablet    E-Prescribing Status: Receipt confirmed by pharmacy (1/16/2019  4:55 PM CST)    Associated Diagnoses     Abnormal Blood Chemistry       Pharmacy     [id number] - [facility], [place] - [address]

## 2021-06-24 NOTE — PROGRESS NOTES
ASSESSMENT/PLAN  1. Actinic keratosis  Multiple actinic keratosis throughout the upper extremities bilaterally are frozen with liquid nitrogen x3  They varied in size from 1-2 mm  12 lesions were frozen  Patient tolerated the procedure well without complication  She is aware of after cares    2. Hypothyroidism, unspecified type  Patient is due for labs in the near future she like to obtain this blood work when she does her blood work for Prolia injections  We will place labs at this time  - Thyroid [place]; Future    3. Health care maintenance  Patient is due for fasting blood work which she will obtain when she returns for endocrine labs and do them at the same time        SUBJECTIVE:   Chief Complaint   Patient presents with     Pruritus     Has spots on back that are itching.  Would like them frozen.      Maria D Moore 84 y.o. female    Current Outpatient Medications   Medication Sig Dispense Refill     aspirin 325 MG tablet Take 325 mg by mouth daily.       calcium carbonate (OS-DOMONIQUE) 600 mg (1,500 mg) tablet Take 600 mg by mouth daily.       cholecalciferol, vitamin D3, 5,000 unit Tab Take by mouth. Take 1 tablet daily.       ciprofloxacin HCl (CIPRO) 500 MG tablet Take one tablet twice daily x 7 days 14 tablet 0     hydrocortisone 0.5 % cream Apply to affected area twice a day. (Patient taking differently: Apply topically 2 (two) times a day as needed. Apply to affected area twice a day.) 30 g 0     levothyroxine (SYNTHROID, LEVOTHROID) 25 MCG tablet Take 1 tablet (25 mcg total) by mouth Daily at 6:00 am. 90 tablet 2     metoprolol succinate (TOPROL-XL) 50 MG 24 hr tablet Take 1 tablet (50 mg total) by mouth at bedtime. 90 tablet 3     mupirocin (BACTROBAN) 2 % ointment Apply topically 3 (three) times a day as needed.       niacin 500 MG tablet Take 500 mg by mouth daily with breakfast.       OMEGA-3/DHA/EPA/FISH OIL (FISH OIL-OMEGA-3 FATTY ACIDS) 300-1,000 mg capsule Take 2,000 mg by mouth daily.         simvastatin (ZOCOR) 20 MG tablet TAKE 1 TABLET DAILY AT BEDTIME 90 tablet 2     sodium fluoride-pot nitrate (PREVIDENT 5000 ENAMEL PROTECT) 1.1-5 % Pste Apply to teeth.       traMADol (ULTRAM) 50 mg tablet Take 1 tablet (50 mg total) by mouth every 8 (eight) hours as needed for pain. 69 tablet 0     triamcinolone (KENALOG) 0.1 % cream Apply topically 2 (two) times a day. Apply ointment to affected areas twice daily until rash is gone. (Patient taking differently: Apply topically 2 (two) times a day as needed. Apply ointment to affected areas twice daily until rash is gone.) 60 g 0     VIT C/E/ZN/COPPR/LUTEIN/ZEAXAN (PRESERVISION AREDS 2 ORAL) Take 1 tablet by mouth 2 (two) times a day.       Current Facility-Administered Medications   Medication Dose Route Frequency Provider Last Rate Last Dose     denosumab 60 mg (PROLIA 60 mg/ml)  60 mg Subcutaneous Q6 Months Deisy Mckeon NP   60 mg at 09/11/18 0812     Allergies: Cephalexin; Erythromycin base; Penicillins; Sulfa (sulfonamide antibiotics); and Vancomycin   No LMP recorded. Patient has had a hysterectomy.    HPI:   Patient is here for destruction of multiple actinic keratosis  She is bothered by the number of actinic keratosis in the upper extremities she feels are quite dry and itchy  Some of them are bothering her on her back which are more consistent with seborrheic keratosis  Check to destroy these lesions with liquid nitrogen  We destroyed 12 different lesions today in her upper extremities and her back with liquid nitrogen  Patient tolerated the procedures well without complication  Discussed aftercare is with her today  Patient is due for fasting blood work she like to obtain this when she is due for her endocrinology labs for Prolia injections  We will place labs for thyroid CMP panel and lipid Cascade    ROS: negative except as per HPI    OBJECTIVE:   The patient appears well, alert, oriented x 3, in no distress.  /56   Pulse (!) 54   Temp 98   F (36.7  C) (Oral)   Resp 12   SpO2 96%     Lungs: clear, good air entry, no wheezes, rhonchi or rales.   Cardiac: S1 and S2 normal, no murmurs, regular rate and rhythm.   Extremities: show no edema, normal peripheral pulses.   Neurological: normal, no focal findings.  Skin: multiple actinic keratosis in the upper extremities bilaterally varying in location  Psych- normal mood and affect      Pt states an understanding and agrees to the above plan.

## 2021-06-24 NOTE — TELEPHONE ENCOUNTER
Patient came into clinic today stating she was seen yesterday by Dr Trent and requesting Bactroban, Triamcinolone and ciprofloxacin medications to be refilled but they were never sent to her [facility] per patient. Patient would like a call when these medications are sent into her clinic. Please advise.

## 2021-06-25 NOTE — PROGRESS NOTES
Progress Notes by Radha Contreras MD at 12/11/2017  7:50 AM     Author: Radha Contreras MD Service: -- Author Type: Physician    Filed: 12/11/2017  8:09 AM Encounter Date: 12/11/2017 Status: Signed    : Radha Contreras MD (Physician)                  [facility].org/Heart  [phone]         Thank you Dr. Trent for asking the [facility] Care team to participate in the care of your patient, Maria D Moore.     Impression and Plan     1. Supraventricular tachycardia (primary atrial tachycardia)/lightheadedness.  Maria D   States from a cardiovascular standpoint she has been doing very well and is pleased with how she is performing.  She has had no subjective SVT since my last visit with her.  She is without cardiovascular complaint.    Plan on follow-up in one year.           History of Present Illness    Once again I would like to thank you again for asking me to participate in the care of your patient, Maria D Moore.  As you know, but to reiterate for my own records, Maria D Moore is a 83 y.o. female with history of SVT. Specifically, patient had suffered a syncopal episode in August 2010. At that time, she was noted to have frequent runs of primary atrial tachycardia characterized by long RP interval and inverted T waves in the inferior leads. Rates were up to approximately 180 bpm. Her arrhythmia responded nicely to metoprolol.     On interview today, Maria D from a cardiovascular standpoint. She denies any subjective recurrence of SVT since my last visit with her. She minimizes any lightheadedness at this time.  She continues to deny chest pain, shortness of breath, or subjective decline in exercise tolerance since last visit.    Further review of systems is otherwise negative/noncontributory (medical record and 13 point review of systems reviewed as well and pertinent positives noted).         Cardiac Diagnostics      Echocardiogram 14 August 2010:  1. Normal  "left ventricular size and systolic performance with ejection fraction of 55%.  2. Moderate tricuspid insufficiency.  3. Mild left atrial enlargement.    Event recorder 6 April 2016 through 2 May 2016:  1. Largely normal 30-day event monitor.  2. Symptoms correlating with sinus rhythm and occasional atrial ectopy.    Holter monitor to March 2015:  1. Moderate amount of atrial ectopy is present but most of the PACs are singular or short runs at modest rates.   2. No atrial fibrillation identified.           Physical Examination       /68 (Patient Site: Right Arm, Patient Position: Sitting, Cuff Size: Adult Regular)  Pulse 84  Resp 16  Ht 5' 1\" (1.549 m)  Wt 126 lb (57.2 kg)  BMI 23.81 kg/m2        Wt Readings from Last 3 Encounters:   12/11/17 126 lb (57.2 kg)   10/27/17 130 lb (59 kg)   08/29/17 131 lb (59.4 kg)     The patient is alert and oriented times three. Sclerae are anicteric. Mucosal membranes are moist. Jugular venous pressure is normal. No significant adenopathy/thyromegally appreciated. Lungs are clear with good expansion. On cardiovascular exam, the patient has a regular S1 and S2. Abdomen is soft and non-tender. Extremities reveal no clubbing, cyanosis, or edema.         Family History/Social History/Risk Factors   Patient does not smoke.  Family history of hypertension.         Medications  Allergies   Current Outpatient Prescriptions   Medication Sig Dispense Refill   ? aspirin 325 MG tablet Take 325 mg by mouth daily.     ? calcium carbonate (OS-DOMONIQUE) 600 mg (1,500 mg) tablet Take 600 mg by mouth daily.     ? cholecalciferol, vitamin D3, 5,000 unit Tab Take by mouth. Take 1 tablet daily.     ? ciprofloxacin HCl (CIPRO) 500 MG tablet Take 2 tabs 1-2 hours prior to dental procedure (Patient taking differently: Take 2 tabs 1-2 hours prior to dental procedure) 2 tablet 3   ? denosumab 60 mg/mL Syrg Inject 60 mg under the skin once. 6/9/2014, Inject subcutaneously 60mg/1ml every 6 months. " (Prolia)     ? metoprolol succinate (TOPROL-XL) 100 MG 24 hr tablet TAKE 1 TABLET DAILY 90 tablet 0   ? niacin 500 MG tablet Take 500 mg by mouth daily with breakfast.     ? OMEGA-3/DHA/EPA/FISH OIL (FISH OIL-OMEGA-3 FATTY ACIDS) 300-1,000 mg capsule 2,000 mg.      ? simvastatin (ZOCOR) 20 MG tablet TAKE 1 TABLET EVERY DAY AT BEDTIME 90 tablet 0   ? sodium fluoride-pot nitrate (PREVIDENT 5000 ENAMEL PROTECT) 1.1-5 % Pste Apply to teeth.     ? traMADol (ULTRAM) 50 mg tablet Take 1 tablet (50 mg total) by mouth every 8 (eight) hours as needed for pain. 90 tablet 0   ? triamcinolone (KENALOG) 0.1 % cream Apply topically 2 (two) times a day. Apply ointment to affected areas twice daily until rash is gone. 60 g 0   ? VIT C/E/ZN/COPPR/LUTEIN/ZEAXAN (PRESERVISION AREDS 2 ORAL) Take 1 tablet by mouth 2 (two) times a day.     ? hydrocortisone 0.5 % cream Apply to affected area twice a day. 30 g 0     Current Facility-Administered Medications   Medication Dose Route Frequency Provider Last Rate Last Dose   ? denosumab 60 mg (PROLIA 60 mg/ml)  60 mg Subcutaneous Q6 Months Deisy Mckeon NP   60 mg at 08/29/17 1058      Allergies   Allergen Reactions   ? Cephalexin Shortness Of Breath   ? Erythromycin Base Rash   ? Penicillins    ? Sulfa (Sulfonamide Antibiotics) Rash   ? Vancomycin Itching          Lab Results   Lab Results   Component Value Date     08/21/2017     08/21/2017    K 4.4 08/21/2017    K 4.4 08/21/2017     08/21/2017     08/21/2017    CO2 24 08/21/2017    CO2 24 08/21/2017    BUN 15 08/21/2017    BUN 15 08/21/2017    CREATININE 0.91 08/21/2017    CREATININE 0.90 08/21/2017    CALCIUM 9.6 08/21/2017    CALCIUM 9.5 08/21/2017     Lab Results   Component Value Date    WBC 7.7 07/26/2016    WBC 5.5 08/11/2015    HGB 13.3 07/26/2016    HCT 38.4 07/26/2016    MCV 92 07/26/2016     07/26/2016     Lab Results   Component Value Date    CHOL 178 10/27/2017    TRIG 47 10/27/2017    HDL 94  10/27/2017    LDLCALC 75 10/27/2017     Lab Results   Component Value Date    TROPONINI <0.01 08/13/2010     Lab Results   Component Value Date    TSH 2.51 08/11/2015

## 2021-06-25 NOTE — PROGRESS NOTES
"Prolia Injection Phone Screen      Screening questions have been asked 2-3 days prior to administration visit for Prolia. If any questions are answered with \"Yes,\" this phone encounter were will routed to ordering provider for further evaluation.     1.  When was the last injection?  9/11/18    2.  Has insurance for this injection been verified?  Yes    3.  Did you experience any new onset achiness or rashes that lasted for over a month with your previous Prolia injection?   No    4.  Do you have a fever over 101?F or a new deep cough that is unusual for you today? No    5.  Have you started any new medications in the last 6 months that you were told could affect your immune system? These may have been prescribed by oncologist, transplant, rheumatology, or dermatology.   No    6.  In the last 6 months have you have gastric bypass or parathyroid surgery?   No    7.  Do you plan dental work requiring drilling into the bone such as implants/extractions or oral surgery in the next 2-3 months?   No    8. Do you have new insurance since the last injection?    Patient informed if symptoms discussed above present prior to their administration appointment, they are to notify clinic immediately.     Minda Kim        The following steps were completed to comply with the REMS program for Prolia:  1. Ordering provider has previously reviewed information in the Medication Guide and Patient Counseling Chart, including the serious risks of Prolia  and the symptoms of each risk and have been advised to seek prompt medical attention if they have signs or symptoms of any of the serious risks.  2. Provided each patient a copy of the Medication Guide and Patient Brochure.  See MAR for administration details.   Indication: Prolia  (denosumab) is a prescription medicine used to treat osteoporosis in patients who:   Are at high risk for fracture, meaning patients who have had a fracture related to osteoporosis, or who have multiple " risk factors for fracture; Cannot use another osteoporosis medicine or other osteoporosis medicines did not work well.   The timeline for early/late injections would be 4 weeks early and any time after the 6 month nomi. If a patient receives their injection late, then the subsequent injection would be 6 months from the date that they actually received the injection    Have the screening questions been asked prior to this administration? Yes      After obtaining consent, and per orders of Deisy Mckeon NP, injection of Prolia 60 mg given by Minda Kim. Patient instructed to remain in clinic for 20 minutes afterwards, and to report any adverse reaction to me immediately.

## 2021-06-25 NOTE — TELEPHONE ENCOUNTER
[facility] Team      Pt did recent labs with PCP. Does she still need lab appt with [facility] in Sept?    Please call her back @ [phone]

## 2021-06-26 NOTE — TELEPHONE ENCOUNTER
Called pt to reschedule lab visit on 9/17/21 - orders for Deisy Mckeon.  Pt is wondering if she will need to do those labs or if they are unnecessary considering she just had them done recently.  Please call patient back to let her know whether or not the labs will be necessary and please schedule a new lab visit if so as lab is closed during the original visit time.

## 2021-06-26 NOTE — PROGRESS NOTES
Progress Notes by Akosua Greenwood CNP at 4/2/2018  8:00 AM     Author: Akosua Greenwood CNP Service: -- Author Type: Nurse Practitioner    Filed: 4/2/2018  8:36 AM Encounter Date: 4/2/2018 Status: Signed    : Akosua Greenwood CNP (Nurse Practitioner)       Assessment/Plan:        Diagnoses and all orders for this visit:    Wound disruption, initial encounter  -     mupirocin (BACTROBAN) 2 % ointment; Apply to affected area 3 times daily  Dispense: 30 g; Refill: 0   Looks like a healing wound, the risks of oral antibiotics are greater than cellulitis.     H/O total knee replacement, unspecified laterality  -     ciprofloxacin HCl (CIPRO) 500 MG tablet; Take 2 tabs 1-2 hours prior to dental procedure  Dispense: 10 tablet; Refill: 0    Xeroderma   Apply emollient skin cream like Vanicream or Eucerin, Aquaphor to the area twice daily          Subjective:    Patient ID: Maria D Moore is a 83 y.o. female.    HPI Patient is here for two concerns.    Sore on left lower shin: was traveling in [place] last week Thursday when she hit her shin on a rickshaw, she developed a sore. Today she is concerned that it isn't healing. Denies increased warmth, drainage, warmth, swelling, fevers, chills, body aches. Has a scab over the open sore with some redness surrounding the crust.     Noticed some scaly skin in her coccyx area. Denies itching, redness, swelling, pain, and open sore. Has history of atopic dermatitis.     Patient uses ciprofloxacin before dental procedures would like a refill.    The following portions of the patient's history were reviewed and updated as appropriate: allergies, current medications, past family history, past medical history, past social history, past surgical history and problem list.    Review of Systems   Skin:        Skin sore on left lower shin, and dry skin in coccyx.    All other systems reviewed and are negative.            Objective:    Physical Exam   Constitutional:  She is oriented to person, place, and time. She appears well-developed and well-nourished.   Neurological: She is alert and oriented to person, place, and time.   Skin: Skin is warm and dry.        Psychiatric: She has a normal mood and affect. Her behavior is normal. Judgment normal.   Nursing note and vitals reviewed.

## 2021-06-26 NOTE — TELEPHONE ENCOUNTER
Date: 7/6/2021 Status: [facility]   Time: 9:45 AM Length: 15   Visit Type: LAB [[id number]] Copay: $0.00   Provider: [facility]

## 2021-06-26 NOTE — PROGRESS NOTES
Progress Notes by Dany Renae MD at 8/30/2018  2:10 PM     Author: Dany Renae MD Service: -- Author Type: Physician    Filed: 8/30/2018  2:20 PM Encounter Date: 8/30/2018 Status: Signed    : Dany Renae MD (Physician)           Click to link to [facility] HEART [facility] ELECTROPHYSIOLOGY NOTE    Today I had the opportunity to see  Maria D Moore for follow-up evaluation of supraventricular tachycardia.      Assessment/Recommendations   Clinic Problem List:  1. 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  Improvement in sense of well-being resolution of episodic lightheadedness noted.  Late recurrence of ventricular tachycardia is very unlikely.  Small area of ischemia on stress nuclear imaging is felt likely to be artifact and no further evaluation is recommended.    Plan:  Continue current medications  Resume activity without restriction  Call if troublesome rapid heart beating  Follow up appointment:   MD Dr. Batool Iraheta if needed         History of Present Illness     Maria D Moore is a 83 y.o. female with a long history of SVT initially felt to be primary atrial tachycardia.  She presented to the emergency department with palpitations and had regular SVT rate 140 bpm on 5/31/2018.  She underwent electrophysiologic studies 6/26/2018 and had inducible typical AV kin reentry with successful slow pathway modification near the anterior lip of the coronary sinus os.  Cardiac echo prior to this procedure showed an ejection fraction is 65% moderate mitral insufficiency and moderate tricuspid insufficiency.  Stress nuclear imaging was done in follow-up on 8/1/2018 which showed a small area of anterior and apical ischemia with ejection fraction of 81%.  This had been ordered due to the finding of ST depression during SVT which is a  nonspecific finding.    Since her ablation she notes resolution of episodes of lightheadedness which had been occurring frequently.  She notes significantly more energy which may be in part related to the reduction of metoprolol from 100-50 mg daily.  She has more energy with activities of daily living.  She denies exertional chest pain or pressure.    Personally reviewed.  Stress nuclear imaging report described above       Physical Examination Review of Systems   Vitals:    08/30/18 1400   BP: 102/60   Pulse: 68   Resp: 16     Body mass index is 24.21 kg/(m^2).  Wt Readings from Last 3 Encounters:   08/30/18 125 lb (56.7 kg)   08/14/18 125 lb 3.2 oz (56.8 kg)   08/01/18 129 lb (58.5 kg)        Appearance:   no distress,    HEENT:   no scleral icterus, normal conjunctivae    Neck: no carotid bruits or thyromegaly   Chest/Lungs:   lungs are clear to auscultation, no rales or wheezing,    Cardiovascular:   Jugular venous pressure less than 5 cm, Apical pulse is 72 bpm predominantly regular with a rare extrasystole. Normal S1,S2 with no murmurs or gallops,   Abdomen:  no  Hepatosplenomegaly., nontender,  bowel sounds are present   Extremities: no cyanosis or clubbing, No edema   Skin: no xanthelasma, warm.    Neurologic: No gross focal neurologic deficits   Mood/Affect: Alert, cooperative    General: WNL  Eyes: WNL  Ears/Nose/Throat: WNL  Lungs: WNL  Heart: WNL  Stomach: WNL  Bladder: WNL  Muscle/Joints: WNL  Skin: WNL  Nervous System: WNL  Mental Health: WNL     Blood: WNL     Medical History  Surgical History Family History Social History   Past Medical History:   Diagnosis Date   ? Back pain    ? Furuncle    ? Hyperlipidemia    ? Macular degeneration    ? Macular degeneration    ? Osteoarthritis    ? Osteoporosis    ? SVT (supraventricular tachycardia) (H)    ? SVT (supraventricular tachycardia) (H)     Past Surgical History:   Procedure Laterality Date   ? EP ABLATION SVT Right 6/26/2018    Procedure: EP Ablation  Supra Ventricular;  Surgeon: Dany Renae MD;  Location: [facility];  Service:    ? HYSTERECTOMY     ? MO APPENDECTOMY      Description: Appendectomy;  Recorded: 12/31/2007;   ? MO REMOVAL ADENOIDS,PRIMARY,<13 Y/O      Description: Adenoidectomy;  Recorded: 12/31/2007;   ? MO REMOVAL OF TONSILS,<13 Y/O      Description: Tonsillectomy;  Recorded: 12/31/2007;   ? MO TOTAL KNEE ARTHROPLASTY      Description: Total Knee Arthroplasty;  Recorded: 12/31/2007;   ? MO VAG HYST,RMV TUBE/OVARY,FIX ENTEROCE      Description: Vag Hysterect Uterus <250g Remov Both Ovaries Rep Enterocele;  Recorded: 12/31/2007;    Family History   Problem Relation Age of Onset   ? Stroke Father     Social History     Social History   ? Marital status:      Spouse name: N/A   ? Number of children: N/A   ? Years of education: N/A     Occupational History   ? Not on file.     Social History Main Topics   ? Smoking status: Never Smoker   ? Smokeless tobacco: Never Used   ? Alcohol use No   ? Drug use: No   ? Sexual activity: Not on file     Other Topics Concern   ? Not on file     Social History Narrative          Medications  Allergies   Current Outpatient Prescriptions   Medication Sig Dispense Refill   ? aspirin 325 MG tablet Take 325 mg by mouth daily.     ? calcium carbonate (OS-DOMONIQUE) 600 mg (1,500 mg) tablet Take 600 mg by mouth daily.     ? cholecalciferol, vitamin D3, 5,000 unit Tab Take by mouth. Take 1 tablet daily.     ? ciprofloxacin HCl (CIPRO) 500 MG tablet Take 2 tabs 1-2 hours prior to dental procedure 10 tablet 0   ? hydrocortisone 0.5 % cream Apply to affected area twice a day. (Patient taking differently: Apply topically 2 (two) times a day as needed. Apply to affected area twice a day.) 30 g 0   ? levothyroxine (SYNTHROID, LEVOTHROID) 25 MCG tablet Take 1 tablet (25 mcg total) by mouth Daily at 6:00 am. 90 tablet 2   ? metoprolol succinate (TOPROL-XL) 50 MG 24 hr tablet Take 1 tablet (50 mg total) by mouth at  bedtime. 90 tablet 3   ? mupirocin (BACTROBAN) 2 % ointment Apply topically 3 (three) times a day as needed.     ? niacin 500 MG tablet Take 500 mg by mouth daily with breakfast.     ? OMEGA-3/DHA/EPA/FISH OIL (FISH OIL-OMEGA-3 FATTY ACIDS) 300-1,000 mg capsule Take 2,000 mg by mouth daily.      ? simvastatin (ZOCOR) 20 MG tablet TAKE 1 TABLET EVERY DAY AT BEDTIME 90 tablet 3   ? sodium fluoride-pot nitrate (PREVIDENT 5000 ENAMEL PROTECT) 1.1-5 % Pste Apply to teeth.     ? traMADol (ULTRAM) 50 mg tablet Take 1 tablet (50 mg total) by mouth every 8 (eight) hours as needed for pain. 90 tablet 0   ? triamcinolone (KENALOG) 0.1 % cream Apply topically 2 (two) times a day. Apply ointment to affected areas twice daily until rash is gone. (Patient taking differently: Apply topically 2 (two) times a day as needed. Apply ointment to affected areas twice daily until rash is gone.) 60 g 0   ? VIT C/E/ZN/COPPR/LUTEIN/ZEAXAN (PRESERVISION AREDS 2 ORAL) Take 1 tablet by mouth 2 (two) times a day.       Current Facility-Administered Medications   Medication Dose Route Frequency Provider Last Rate Last Dose   ? denosumab 60 mg (PROLIA 60 mg/ml)  60 mg Subcutaneous Q6 Months Deisy Mckeon NP   60 mg at 03/02/18 7657      Allergies   Allergen Reactions   ? Cephalexin Shortness Of Breath   ? Erythromycin Base Rash   ? Penicillins    ? Sulfa (Sulfonamide Antibiotics) Rash   ? Vancomycin Itching

## 2021-06-26 NOTE — TELEPHONE ENCOUNTER
Please call the pt to schedule a lab appt prior to the appt with TESSIE Skaggs. She can have the appt the same day as the prolia, if the pt wishes. She does need this lab for a vitamin D level.

## 2021-06-26 NOTE — PROGRESS NOTES
Progress Notes by Radha Contreras MD at 7/12/2018  7:50 AM     Author: Radha Contreras MD Service: -- Author Type: Physician    Filed: 7/12/2018  8:07 AM Encounter Date: 7/12/2018 Status: Signed    : Radha Contreras MD (Physician)                  [email or web address]  [phone]         Thank you Dr. Trent for asking the [facility] team to participate in the care of your patient, Maria D Moore.     Impression and Plan     1. Supraventricular tachycardia. Maria D underwent successful ablation of slow AV node pathway with cryoablation 26 June 2018.  Patient has done well since her ablation.  She reports no subjective recurrence. Of note, prior to ablation, her SVT did demonstrate ST depression in the inferior and anterolateral leads.  She reports very rare fleeting chest discomfort only.  She reports no exertional chest pain or subjective decline in exercise tolerance. Plan:    Nuclear perfusion imaging study to rule out possible underlying coronary disease given ST changes during tachycardia.    Plan on follow-up in one year.         History of Present Illness    Once again I would like to thank you again for asking me to participate in the care of your patient, Maria D Moore.  As you know, but to reiterate for my own records, Maria D Moore is a 83 y.o. female with history of SVT.  Patient underwent successful ablation of slow AV node pathway with cryoablation 26 June 2018.    In follow-up today, patient states that she has been doing very well since her ablative therapy.  She denies any subjective recurrence of her SVT.  She reports some very minimal chest discomfort though this is not provoked with exertion and the like.  It is fleeting in nature.  Symptoms sound very atypical for ischemic etiology.    Further review of systems is otherwise negative/noncontributory (medical record and 13 point review of systems reviewed as well and pertinent positives  noted).         Cardiac Diagnostics      Echocardiogram 1 June 2018 (personally reviewed):  1. Normal left ventricular size and systolic performance with a visually estimated ejection fraction of 65%.   2. There is moderate mitral insufficiency.   3. There is moderate tricuspid insufficiency.   4. Normal right ventricular size and systolic performance.   5. There is moderate biatrial enlargement.  6. When compared to the prior real-time echocardiogram dated 14 August 2010, the degree of mitral insufficiency has increased from mild to now moderate.    Echocardiogram 14 August 2010:  1. Normal left ventricular size and systolic performance with ejection fraction of 55%.  2. Moderate tricuspid insufficiency.  3. Mild left atrial enlargement.     Event recorder 6 April 2016 through 2 May 2016:  1. Largely normal 30-day event monitor.  2. Symptoms correlating with sinus rhythm and occasional atrial ectopy.     Holter monitor 2 March 2015:  1. Moderate amount of atrial ectopy is present but most of the PACs are singular or short runs at modest rates.   2. No atrial fibrillation identified.                Physical Examination       /68 (Patient Site: Right Arm, Patient Position: Sitting, Cuff Size: Adult Regular)  Pulse 72  Resp 16  Ht 5' (1.524 m)  Wt 129 lb (58.5 kg)  BMI 25.19 kg/m2        Wt Readings from Last 3 Encounters:   07/12/18 129 lb (58.5 kg)   06/26/18 125 lb 11.2 oz (57 kg)   06/06/18 127 lb (57.6 kg)     The patient is alert and oriented times three. Sclerae are anicteric. Mucosal membranes are moist. Jugular venous pressure is normal. No significant adenopathy/thyromegally appreciated. Lungs are clear with good expansion. On cardiovascular exam, the patient has a regular S1 and S2. Abdomen is soft and non-tender. Extremities reveal no clubbing, cyanosis, or edema.           Family History/Social History/Risk Factors   Patient does not smoke.  Family history of hypertension.         Medications   Allergies   Current Outpatient Prescriptions   Medication Sig Dispense Refill   ? aspirin 325 MG tablet Take 325 mg by mouth daily.     ? calcium carbonate (OS-DOMONIQUE) 600 mg (1,500 mg) tablet Take 600 mg by mouth daily.     ? cholecalciferol, vitamin D3, 5,000 unit Tab Take by mouth. Take 1 tablet daily.     ? ciprofloxacin HCl (CIPRO) 500 MG tablet Take 2 tabs 1-2 hours prior to dental procedure 10 tablet 0   ? hydrocortisone 0.5 % cream Apply to affected area twice a day. (Patient taking differently: Apply topically 2 (two) times a day as needed. Apply to affected area twice a day.) 30 g 0   ? levothyroxine (SYNTHROID, LEVOTHROID) 25 MCG tablet Take 1 tablet (25 mcg total) by mouth Daily at 6:00 am. 90 tablet 0   ? metoprolol succinate (TOPROL-XL) 50 MG 24 hr tablet Take 1 tablet (50 mg total) by mouth at bedtime. 90 tablet 3   ? mupirocin (BACTROBAN) 2 % ointment Apply topically 3 (three) times a day as needed.     ? niacin 500 MG tablet Take 500 mg by mouth daily with breakfast.     ? OMEGA-3/DHA/EPA/FISH OIL (FISH OIL-OMEGA-3 FATTY ACIDS) 300-1,000 mg capsule Take 2,000 mg by mouth daily.      ? simvastatin (ZOCOR) 20 MG tablet TAKE 1 TABLET EVERY DAY AT BEDTIME 90 tablet 3   ? sodium fluoride-pot nitrate (PREVIDENT 5000 ENAMEL PROTECT) 1.1-5 % Pste Apply to teeth.     ? traMADol (ULTRAM) 50 mg tablet Take 1 tablet (50 mg total) by mouth every 8 (eight) hours as needed for pain. 90 tablet 0   ? triamcinolone (KENALOG) 0.1 % cream Apply topically 2 (two) times a day. Apply ointment to affected areas twice daily until rash is gone. (Patient taking differently: Apply topically 2 (two) times a day as needed. Apply ointment to affected areas twice daily until rash is gone.) 60 g 0   ? VIT C/E/ZN/COPPR/LUTEIN/ZEAXAN (PRESERVISION AREDS 2 ORAL) Take 1 tablet by mouth 2 (two) times a day.       Current Facility-Administered Medications   Medication Dose Route Frequency Provider Last Rate Last Dose   ? denosumab 60 mg  (PROLIA 60 mg/ml)  60 mg Subcutaneous Q6 Months Deisy WILMER Mckeon, TESSIE   60 mg at 03/02/18 8906      Allergies   Allergen Reactions   ? Cephalexin Shortness Of Breath   ? Erythromycin Base Rash   ? Penicillins    ? Sulfa (Sulfonamide Antibiotics) Rash   ? Vancomycin Itching          Lab Results   Lab Results   Component Value Date     06/26/2018    K 4.1 06/26/2018     (H) 06/26/2018    CO2 21 (L) 06/26/2018    BUN 11 06/26/2018    CREATININE 0.76 06/26/2018    CALCIUM 8.4 (L) 06/26/2018     Lab Results   Component Value Date    WBC 6.2 06/26/2018    WBC 5.5 08/11/2015    HGB 12.9 06/26/2018    HCT 39.1 06/26/2018    MCV 94 06/26/2018     06/26/2018     Lab Results   Component Value Date    CHOL 178 10/27/2017    TRIG 47 10/27/2017    HDL 94 10/27/2017    LDLCALC 75 10/27/2017     Lab Results   Component Value Date    INR 1.16 (H) 06/01/2018     Lab Results   Component Value Date    TROPONINI 0.31 (HH) 06/01/2018    TROPONINI 0.41 (HH) 05/31/2018    TROPONINI 0.28 05/31/2018     Lab Results   Component Value Date    TSH 1.79 07/09/2018

## 2021-06-27 NOTE — PROGRESS NOTES
Progress Notes by Radha Contreras MD at 8/6/2019  1:10 PM     Author: Radha Contreras MD Service: -- Author Type: Physician    Filed: 8/6/2019  1:18 PM Encounter Date: 8/6/2019 Status: Signed    : Radha Contreras MD (Physician)                  [email or web address]  [phone]         Thank you Dr. Trent for asking the [facility] team to participate in the care of your patient, Maria D Moore.     Impression and Plan     1. Supraventricular tachycardia. Maria D underwent successful ablation of slow AV node pathway with cryoablation 26 June 2018.  Patient has done well since her ablation.  She reports no subjective recurrence.  At this time I did indicate that she could simply follow-up with me on an as-needed basis.  She states, that she would continue to like annual follow-ups for it provides her some reassurance.     Plan on follow-up in one year.            History of Present Illness    Once again I would like to thank you again for asking me to participate in the care of your patient, Maria D Moore.  As you know, but to reiterate for my own records, Maria D Moore is a 84 y.o. female with history of SVT.  Patient underwent successful ablation of slow AV node pathway with cryoablation 26 June 2018.     In follow-up today, patient states that she has been doing very well since her ablative therapy.  She denies any subjective recurrence of her SVT.  She reports some very minimal chest discomfort though this is not provoked with exertion and the like.  It is fleeting in nature.  Symptoms sound very atypical for ischemic etiology.    Further review of systems is otherwise negative/noncontributory (medical record and 13 point review of systems reviewed as well and pertinent positives noted).         Cardiac Diagnostics      Echocardiogram 1 June 2018 (personally reviewed):  1. Normal left ventricular size and systolic performance with a visually estimated  "ejection fraction of 65%.   2. There is moderate mitral insufficiency.   3. There is moderate tricuspid insufficiency.   4. Normal right ventricular size and systolic performance.   5. There is moderate biatrial enlargement.  o When compared to the prior real-time echocardiogram dated 14 August 2010, the degree of mitral insufficiency has increased from mild to now moderate.    Echocardiogram 14 August 2010:  1. Normal left ventricular size and systolic performance with ejection fraction of 55%.  2. Moderate tricuspid insufficiency.  3. Mild left atrial enlargement.    Nuclear perfusion imaging study 1 August 2018:  1. Small reversible defect in the anterior/apical segments.  Significant splanchnic artifact reducing sensitivity of finding.  2. Normal left ventricular systolic performance with ejection fraction of 81%.    Event recorder 6 April 2016 through 2 May 2016:  1. Largely normal 30-day event monitor.  2. Symptoms correlating with sinus rhythm and occasional atrial ectopy.    Holter monitor 2 March 2015:  1. Moderate amount of atrial ectopy is present but most of the PACs are singular or short runs at modest rates.   2. No atrial fibrillation identified.           Physical Examination       BP 90/58 (Patient Site: Right Arm, Patient Position: Sitting, Cuff Size: Adult Regular)   Pulse 88   Resp 16   Ht 5' 0.25\" (1.53 m)   Wt 127 lb (57.6 kg)   BMI 24.60 kg/m          Wt Readings from Last 3 Encounters:   08/06/19 127 lb (57.6 kg)   09/11/18 127 lb (57.6 kg)   08/30/18 125 lb (56.7 kg)     The patient is alert and oriented times three. Sclerae are anicteric. Mucosal membranes are moist. Jugular venous pressure is normal. No significant adenopathy/thyromegally appreciated. Lungs are clear with good expansion. On cardiovascular exam, the patient has a regular S1 and S2. Abdomen is soft and non-tender. Extremities reveal no clubbing, cyanosis, or edema.           Family History/Social History/Risk Factors "   Patient does not smoke.  Family history of hypertension.         Medications  Allergies   Current Outpatient Medications   Medication Sig Dispense Refill   ? aspirin 325 MG tablet Take 325 mg by mouth daily.     ? calcium carbonate (OS-DOMONIQUE) 600 mg (1,500 mg) tablet Take 600 mg by mouth daily.     ? cholecalciferol, vitamin D3, 5,000 unit Tab Take by mouth. Take 1 tablet daily.     ? ciprofloxacin HCl (CIPRO) 500 MG tablet 500mg once 1 hr prior to procedure 4 tablet 0   ? hydrocortisone 0.5 % cream Apply to affected area twice a day. (Patient taking differently: Apply topically 2 (two) times a day as needed. Apply to affected area twice a day.) 30 g 0   ? levothyroxine (SYNTHROID, LEVOTHROID) 25 MCG tablet Take 1 tablet (25 mcg total) by mouth Daily at 6:00 am. 90 tablet 3   ? metoprolol succinate (TOPROL-XL) 50 MG 24 hr tablet Take 1 tablet (50 mg total) by mouth at bedtime. 90 tablet 3   ? mupirocin (BACTROBAN) 2 % ointment Apply topically 3 (three) times a day as needed. 30 g 0   ? niacin 500 MG tablet Take 500 mg by mouth daily with breakfast.     ? OMEGA-3/DHA/EPA/FISH OIL (FISH OIL-OMEGA-3 FATTY ACIDS) 300-1,000 mg capsule Take 2,000 mg by mouth daily.      ? simvastatin (ZOCOR) 20 MG tablet TAKE 1 TABLET DAILY AT BEDTIME 90 tablet 2   ? sodium fluoride-pot nitrate (PREVIDENT 5000 ENAMEL PROTECT) 1.1-5 % Pste Apply to teeth.     ? traMADol (ULTRAM) 50 mg tablet Take 1 tablet (50 mg total) by mouth every 8 (eight) hours as needed for pain. 69 tablet 0   ? triamcinolone (KENALOG) 0.1 % cream Apply topically 2 (two) times a day. Apply ointment to affected areas twice daily until rash is gone. 45 g 0   ? VIT C/E/ZN/COPPR/LUTEIN/ZEAXAN (PRESERVISION AREDS 2 ORAL) Take 1 tablet by mouth 2 (two) times a day.       Current Facility-Administered Medications   Medication Dose Route Frequency Provider Last Rate Last Dose   ? denosumab 60 mg (PROLIA 60 mg/ml)  60 mg Subcutaneous Q6 Months Deisy Mckeon NP   60 mg at  03/18/19 1016      Allergies   Allergen Reactions   ? Cephalexin Shortness Of Breath   ? Erythromycin Base Rash   ? Penicillins    ? Sulfa (Sulfonamide Antibiotics) Rash   ? Vancomycin Itching          Lab Results   Lab Results   Component Value Date     06/26/2018    K 4.1 06/26/2018     (H) 06/26/2018    CO2 21 (L) 06/26/2018    BUN 11 06/26/2018    CREATININE 0.76 06/26/2018    CALCIUM 9.5 08/14/2018     Lab Results   Component Value Date    WBC 6.2 06/26/2018    WBC 5.5 08/11/2015    HGB 12.9 06/26/2018    HCT 39.1 06/26/2018    MCV 94 06/26/2018     06/26/2018     Lab Results   Component Value Date    CHOL 178 10/27/2017    TRIG 47 10/27/2017    HDL 94 10/27/2017    LDLCALC 75 10/27/2017     Lab Results   Component Value Date    INR 1.16 (H) 06/01/2018     Lab Results   Component Value Date    TROPONINI 0.31 (HH) 06/01/2018    TROPONINI 0.41 (HH) 05/31/2018    TROPONINI 0.28 05/31/2018     Lab Results   Component Value Date    TSH 1.79 07/09/2018

## 2021-06-29 NOTE — PROGRESS NOTES
Progress Notes by Radha Contreras MD at 9/16/2020  7:50 AM     Author: Radha Contreras MD Service: -- Author Type: Physician    Filed: 9/16/2020  8:33 AM Encounter Date: 9/16/2020 Status: Signed    : Radha Contreras MD (Physician)                                       Thank you Dr. Bain for asking the [facility] team to participate in the care of your patient, Maria D Moore.     Impression and Plan     1. Supraventricular tachycardia. Maria D underwent successful ablation of slow AV node pathway with cryoablation 26 June 2018.  Patient has done well since her ablation.  She has had perhaps only 1 subjective recurrence since my last visit with her.  Overall, she is very pleased with how she is performing from a cardiac standpoint.     Plan on follow-up in one year.           History of Present Illness    Once again I would like to thank you again for asking me to participate in the care of your patient, Maria D Moore.  As you know, but to reiterate for my own records, Maria D Moore is a 85 y.o. female with history of SVT.  Patient underwent successful ablation of slow AV node pathway with cryoablation 26 June 2018.     In follow-up today, patient states that she has been doing very well since her ablative therapy.  She denies any subjective recurrence of her SVT with the only exception of perhaps one episode since my last visit with her.  Patient reports no chest discomfort symptoms.  Breathing is comfortable.  She denies any subjective decrease in her exercise tolerance.    Further review of systems is otherwise negative/noncontributory (medical record and 13 point review of systems reviewed as well and pertinent positives noted).         Cardiac Diagnostics      Echocardiogram 1 June 2018 (personally reviewed):  1. Normal left ventricular size and systolic performance with a visually estimated ejection fraction of 65%.   2. There is moderate mitral  "insufficiency.   3. There is moderate tricuspid insufficiency.   4. Normal right ventricular size and systolic performance.   5. There is moderate biatrial enlargement.    When compared to the prior real-time echocardiogram dated 14 August 2010, the degree of mitral insufficiency has increased from mild to now moderate.    Echocardiogram 14 August 2010:  1. Normal left ventricular size and systolic performance with ejection fraction of 55%.  2. Moderate tricuspid insufficiency.  3. Mild left atrial enlargement.    Nuclear perfusion imaging study 1 August 2018:  1. Small reversible defect in the anterior/apical segments.  Significant splanchnic artifact reducing sensitivity of finding.  2. Normal left ventricular systolic performance with ejection fraction of 81%.    Event recorder 6 April 2016 through 2 May 2016:  1. Largely normal 30-day event monitor.  2. Symptoms correlating with sinus rhythm and occasional atrial ectopy.    Holter monitor 2 March 2015:  1. Moderate amount of atrial ectopy is present but most of the PACs are singular or short runs at modest rates.   2. No atrial fibrillation identified.           Physical Examination       /76 (Patient Site: Right Arm, Patient Position: Sitting, Cuff Size: Adult Regular)   Pulse 64   Resp 12   Ht 5' 0.25\" (1.53 m)   Wt 123 lb (55.8 kg)   BMI 23.82 kg/m          Wt Readings from Last 3 Encounters:   09/16/20 123 lb (55.8 kg)   06/29/20 123 lb 12.8 oz (56.2 kg)   10/15/19 129 lb 6.4 oz (58.7 kg)       The patient is alert and oriented times three. Sclerae are anicteric. Mucosal membranes are moist. Jugular venous pressure is normal. No significant adenopathy/thyromegally appreciated. Lungs are clear with good expansion. On cardiovascular exam, the patient has a regular S1 and S2. Abdomen is soft and non-tender. Extremities reveal no clubbing, cyanosis, or edema.         Family History/Social History/Risk Factors   Patient does not smoke.  Family history " reviewed, and family history includes Stroke in her father.          Medications  Allergies   Current Outpatient Medications   Medication Sig Dispense Refill   ? aspirin 325 MG tablet Take 325 mg by mouth daily.     ? calcium carbonate (OS-DOMONIQUE) 600 mg (1,500 mg) tablet Take 600 mg by mouth every other day.            ? cholecalciferol, vitamin D3, 5,000 unit Tab Take by mouth. Take 1 tablet daily.     ? ciprofloxacin HCl (CIPRO) 500 MG tablet One tablet twice daily for up to 3 days for traveler's diarrhea 6 tablet 0   ? hydrocortisone 0.5 % cream Apply to affected area twice a day. (Patient taking differently: Apply topically 2 (two) times a day as needed. Apply to affected area twice a day.) 30 g 0   ? levothyroxine (SYNTHROID, LEVOTHROID) 25 MCG tablet Take 1 tablet (25 mcg total) by mouth Daily at 6:00 am. 90 tablet 3   ? metoprolol succinate (TOPROL-XL) 50 MG 24 hr tablet Take 1 tablet (50 mg total) by mouth at bedtime. 90 tablet 3   ? mupirocin (BACTROBAN) 2 % ointment Apply topically 3 (three) times a day as needed. 30 g 0   ? niacin 500 MG tablet Take 500 mg by mouth daily with breakfast.     ? OMEGA-3/DHA/EPA/FISH OIL (FISH OIL-OMEGA-3 FATTY ACIDS) 300-1,000 mg capsule Take 2,000 mg by mouth daily.      ? simvastatin (ZOCOR) 20 MG tablet TAKE 1 TABLET DAILY AT BEDTIME 90 tablet 3   ? traMADoL (ULTRAM) 50 mg tablet Take 1 tablet (50 mg total) by mouth at bedtime as needed for severe pain (7-10). 69 tablet 0   ? triamcinolone (KENALOG) 0.1 % cream Apply topically 2 (two) times a day. Apply ointment to affected areas twice daily until rash is gone. 45 g 0   ? VIT C/E/ZN/COPPR/LUTEIN/ZEAXAN (PRESERVISION AREDS 2 ORAL) Take 1 tablet by mouth 2 (two) times a day.     ? sodium fluoride-pot nitrate (PREVIDENT 5000 ENAMEL PROTECT) 1.1-5 % Pste Apply to teeth.       Current Facility-Administered Medications   Medication Dose Route Frequency Provider Last Rate Last Dose   ? denosumab 60 mg (PROLIA 60 mg/ml)  60 mg  Subcutaneous Q6 Months Deisy Mckeon NP   60 mg at 09/18/19 0832   ? denosumab 60 mg (PROLIA 60 mg/ml)  60 mg Subcutaneous Q6 Months Deisy Mckeon NP   60 mg at 06/23/20 1000      Allergies   Allergen Reactions   ? Cephalexin Shortness Of Breath   ? Erythromycin Base Rash   ? Penicillins    ? Sulfa (Sulfonamide Antibiotics) Rash   ? Vancomycin Itching          Lab Results   Lab Results   Component Value Date     06/29/2020    K 4.5 06/29/2020     06/29/2020    CO2 20 (L) 06/29/2020    BUN 16 06/29/2020    CREATININE 0.85 06/29/2020    CALCIUM 8.9 06/29/2020     Lab Results   Component Value Date    WBC 6.6 06/29/2020    WBC 5.5 08/11/2015    HGB 13.6 06/29/2020    HCT 39.0 06/29/2020    MCV 94 06/29/2020     06/29/2020     Lab Results   Component Value Date    CHOL 192 09/11/2019    TRIG 52 09/11/2019     09/11/2019    LDLCALC 80 09/11/2019     Lab Results   Component Value Date    INR 1.16 (H) 06/01/2018     Lab Results   Component Value Date    TROPONINI 0.31 (HH) 06/01/2018    TROPONINI 0.41 (HH) 05/31/2018    TROPONINI 0.28 05/31/2018     Lab Results   Component Value Date    TSH 1.24 06/29/2020

## 2021-06-30 NOTE — PROGRESS NOTES
Progress Notes by Radha Contreras MD at 5/19/2021  7:50 AM     Author: Radha Contreras MD Service: -- Author Type: Physician    Filed: 5/19/2021  8:19 AM Encounter Date: 5/19/2021 Status: Signed    : Radha Contreras MD (Physician)                                       Thank you Dr. Bain for asking the [facility] team to participate in the care of your patient, Maria D Moore.     Impression and Plan     1. Supraventricular tachycardia. Maria D underwent successful ablation of slow AV node pathway with cryoablation 26 June 2018.  This denies any subjective recurrence of SVT since my last visit with her.  Overall, she is very pleased with how she is performing from a cardiac standpoint.    2.  Mitral insufficiency.  This was felt moderate in degree on last echocardiogram on 1 June 2018.  Significant progression is not suspected on clinical exam or subjectively.     Plan on follow-up in one year.    25 minutes spent reviewing prior records (including documentation, laboratory studies, cardiac testing/imaging), interview with patient along with physical exam, planning, and subsequent documentation/crafting of note.           History of Present Illness    Once again I would like to thank you again for asking me to participate in the care of your patient, Maria D Moore.  As you know, but to reiterate for my own records, Maria D Moore is a 86 y.o. female with history of SVT.  Patient underwent successful ablation of slow AV node pathway with cryoablation 26 June 2018.     In follow-up today, Maria D denies any recurrent subjective SVT since my last visit with her.  She denies any chest pain or shortness of breath.  She continues to walk 10,000 steps on a regular basis on her Fitbit.  She denies any subjective decline in exercise tolerance.  No fevers, chills, or other constitutional symptoms.    Further review of systems is otherwise negative/noncontributory (medical  record and 13 point review of systems reviewed as well and pertinent positives noted).         Cardiac Diagnostics      Echocardiogram 1 June 2018 (personally reviewed):  1. Normal left ventricular size and systolic performance with a visually estimated ejection fraction of 65%.   2. There is moderate mitral insufficiency.   3. There is moderate tricuspid insufficiency.   4. Normal right ventricular size and systolic performance.   5. There is moderate biatrial enlargement.    When compared to the prior real-time echocardiogram dated 14 August 2010, the degree of mitral insufficiency has increased from mild to now moderate.    Echocardiogram 14 August 2010:  1. Normal left ventricular size and systolic performance with ejection fraction of 55%.  2. Moderate tricuspid insufficiency.  3. Mild left atrial enlargement.    Nuclear perfusion imaging study 1 August 2018:  1. Small reversible defect in the anterior/apical segments.  Significant splanchnic artifact reducing sensitivity of finding.  2. Normal left ventricular systolic performance with ejection fraction of 81%.    Event recorder 6 April 2016 through 2 May 2016:  1. Largely normal 30-day event monitor.  2. Symptoms correlating with sinus rhythm and occasional atrial ectopy.    Holter monitor 2 March 2015:  1. Moderate amount of atrial ectopy is present but most of the PACs are singular or short runs at modest rates.   2. No atrial fibrillation identified.           Physical Examination       /60 (Patient Site: Right Arm, Patient Position: Sitting, Cuff Size: Adult Regular)   Pulse 64   Resp 16   Ht 5' (1.524 m)   Wt 127 lb (57.6 kg)   BMI 24.80 kg/m          Wt Readings from Last 3 Encounters:   05/19/21 127 lb (57.6 kg)   04/08/21 121 lb 12.8 oz (55.2 kg)   02/26/21 121 lb 9.6 oz (55.2 kg)       The patient is alert and oriented times three. Sclerae are anicteric. Mucosal membranes are moist. Jugular venous pressure is normal. No significant  adenopathy/thyromegally appreciated. Lungs are clear with good expansion. On cardiovascular exam, the patient has a regular S1 and S2.  Systolic murmur only.  Abdomen is soft and non-tender. Extremities reveal no clubbing, cyanosis, or edema.         Family History/Social History/Risk Factors   Patient does not smoke.  Family history reviewed, and family history includes Stroke in her father.          Medications  Allergies   Current Outpatient Medications   Medication Sig Dispense Refill   ? aspirin 81 MG EC tablet Take 81 mg by mouth daily.     ? calcium carbonate (OS-DOMONIQUE) 600 mg (1,500 mg) tablet Take 600 mg by mouth every other day.            ? cholecalciferol, vitamin D3, 5,000 unit Tab Take by mouth. Take 1 tablet daily.     ? levothyroxine (SYNTHROID, LEVOTHROID) 25 MCG tablet TAKE 1 TABLET DAILY AT 6:00 A.M. 90 tablet 2   ? metoprolol succinate (TOPROL-XL) 50 MG 24 hr tablet Take 1 tablet (50 mg total) by mouth at bedtime. 90 tablet 3   ? niacin 500 MG tablet Take 500 mg by mouth daily with breakfast.     ? OMEGA-3/DHA/EPA/FISH OIL (FISH OIL-OMEGA-3 FATTY ACIDS) 300-1,000 mg capsule Take 2,000 mg by mouth daily.      ? pantoprazole (PROTONIX) 40 MG tablet Take 1 tablet (40 mg total) by mouth 2 (two) times a day. 180 tablet 3   ? simvastatin (ZOCOR) 20 MG tablet TAKE 1 TABLET DAILY AT BEDTIME 90 tablet 3   ? VIT C/E/ZN/COPPR/LUTEIN/ZEAXAN (PRESERVISION AREDS 2 ORAL) Take 1 tablet by mouth 2 (two) times a day.       Current Facility-Administered Medications   Medication Dose Route Frequency Provider Last Rate Last Admin   ? denosumab 60 mg (PROLIA 60 mg/ml)  60 mg Subcutaneous Q6 Months Mike Deisy L, NP   60 mg at 09/18/19 0832   ? denosumab 60 mg (PROLIA 60 mg/ml)  60 mg Subcutaneous Q6 Months Heather Mckeonca L, NP   60 mg at 12/31/20 1000      Allergies   Allergen Reactions   ? Cephalexin Shortness Of Breath   ? Erythromycin Base Rash   ? Penicillins    ? Sulfa (Sulfonamide Antibiotics) Rash   ?  Vancomycin Itching          Lab Results   Lab Results   Component Value Date     04/08/2021    K 4.4 04/08/2021     (H) 04/08/2021    CO2 23 04/08/2021    BUN 16 04/08/2021    CREATININE 0.86 04/08/2021    CALCIUM 9.1 04/08/2021     Lab Results   Component Value Date    WBC 5.4 04/08/2021    WBC 5.5 08/11/2015    HGB 12.9 04/08/2021    HCT 39.6 04/08/2021    MCV 94 04/08/2021     04/08/2021     Lab Results   Component Value Date    CHOL 203 (H) 04/08/2021    TRIG 89 04/08/2021    HDL 86 04/08/2021    LDLCALC 99 04/08/2021     Lab Results   Component Value Date    INR 1.16 (H) 06/01/2018     Lab Results   Component Value Date     (H) 04/08/2021     Lab Results   Component Value Date    TROPONINI 0.31 (HH) 06/01/2018    TROPONINI 0.41 (HH) 05/31/2018    TROPONINI 0.28 05/31/2018     Lab Results   Component Value Date    TSH 1.64 04/08/2021

## 2021-07-03 NOTE — ADDENDUM NOTE
Addendum Note by Wendy Arceo CMA at 7/9/2018  2:31 PM     Author: Wendy Arceo CMA Service: -- Author Type: Certified Medical Assistant    Filed: 7/9/2018  2:31 PM Encounter Date: 7/9/2018 Status: Signed    : Wendy Arceo CMA (Certified Medical Assistant)    Addended by: WENDY ARCEO on: 7/9/2018 02:31 PM        Modules accepted: Orders

## 2021-07-03 NOTE — ADDENDUM NOTE
Addendum Note by Marguerite Jasso FNP at 1/23/2019 10:30 AM     Author: Marguerite Jasso FNP Service: -- Author Type: Nurse Practitioner    Filed: 1/23/2019 10:30 AM Encounter Date: 1/18/2019 Status: Signed    : Marguerite Jasso FNP (Nurse Practitioner)    Addended by: MARGUERITE JASSO on: 1/23/2019 10:30 AM        Modules accepted: Orders

## 2021-07-03 NOTE — ADDENDUM NOTE
Addendum Note by Pat Yang CMA at 1/22/2019  4:00 PM     Author: Pat Yang CMA Service: -- Author Type: Certified Medical Assistant    Filed: 1/22/2019  4:00 PM Encounter Date: 1/18/2019 Status: Signed    : Pat Yang CMA (Certified Medical Assistant)    Addended by: PAT YANG on: 1/22/2019 04:00 PM        Modules accepted: Orders

## 2021-07-03 NOTE — ADDENDUM NOTE
Addendum Note by Margaret Rosas RN at 5/9/2017  3:11 PM     Author: Margaret Rosas RN Service: -- Author Type: Registered Nurse    Filed: 5/9/2017  3:11 PM Encounter Date: 5/9/2017 Status: Signed    : Margaret Rosas RN (Registered Nurse)    Addended by: MARGARET ROSAS on: 5/9/2017 03:11 PM        Modules accepted: Orders

## 2021-07-03 NOTE — ADDENDUM NOTE
Addendum Note by Polly Mckeon NP at 8/21/2017 11:47 AM     Author: Polly Mckeon NP Service: -- Author Type: Nurse Practitioner    Filed: 8/21/2017 11:47 AM Encounter Date: 8/3/2017 Status: Signed    : Polly Mckeon NP (Nurse Practitioner)    Addended by: POLLY MCKEON on: 8/21/2017 11:47 AM        Modules accepted: Orders

## 2021-07-06 ENCOUNTER — AMBULATORY - HEALTHEAST (OUTPATIENT)
Dept: LAB | Facility: CLINIC | Age: 86
End: 2021-07-06

## 2021-07-06 DIAGNOSIS — M81.0 SENILE OSTEOPOROSIS: ICD-10-CM

## 2021-07-07 LAB — 25(OH)D3 SERPL-MCNC: 64 NG/ML (ref 30–80)

## 2021-07-13 ENCOUNTER — RECORDS - HEALTHEAST (OUTPATIENT)
Dept: ADMINISTRATIVE | Facility: CLINIC | Age: 86
End: 2021-07-13

## 2021-07-21 ENCOUNTER — RECORDS - HEALTHEAST (OUTPATIENT)
Dept: ADMINISTRATIVE | Facility: CLINIC | Age: 86
End: 2021-07-21

## 2021-07-22 ENCOUNTER — RECORDS - HEALTHEAST (OUTPATIENT)
Dept: FAMILY MEDICINE | Facility: CLINIC | Age: 86
End: 2021-07-22

## 2021-07-22 DIAGNOSIS — Z12.31 OTHER SCREENING MAMMOGRAM: ICD-10-CM

## 2021-08-31 DIAGNOSIS — R79.89 OTHER SPECIFIED ABNORMAL FINDINGS OF BLOOD CHEMISTRY: ICD-10-CM

## 2021-08-31 DIAGNOSIS — E78.5 HYPERLIPIDEMIA, UNSPECIFIED HYPERLIPIDEMIA TYPE: ICD-10-CM

## 2021-08-31 DIAGNOSIS — I47.10 SVT (SUPRAVENTRICULAR TACHYCARDIA) (H): ICD-10-CM

## 2021-08-31 NOTE — TELEPHONE ENCOUNTER
Patient requesting refills. Medications pended.   Last OV 4/2021 with labs.   Please call once prescriptions have been sent.

## 2021-09-01 RX ORDER — LEVOTHYROXINE SODIUM 25 UG/1
TABLET ORAL
Qty: 90 TABLET | Refills: 3 | Status: SHIPPED | OUTPATIENT
Start: 2021-09-01 | End: 2022-03-31

## 2021-09-01 RX ORDER — METOPROLOL SUCCINATE 50 MG/1
50 TABLET, EXTENDED RELEASE ORAL AT BEDTIME
Qty: 90 TABLET | Refills: 3 | Status: SHIPPED | OUTPATIENT
Start: 2021-09-01 | End: 2022-03-31

## 2021-09-01 RX ORDER — SIMVASTATIN 20 MG
TABLET ORAL
Qty: 90 TABLET | Refills: 3 | Status: SHIPPED | OUTPATIENT
Start: 2021-09-01 | End: 2022-03-31

## 2021-09-22 ENCOUNTER — OFFICE VISIT (OUTPATIENT)
Dept: ENDOCRINOLOGY | Facility: CLINIC | Age: 86
End: 2021-09-22
Payer: COMMERCIAL

## 2021-09-22 VITALS
SYSTOLIC BLOOD PRESSURE: 100 MMHG | HEART RATE: 64 BPM | WEIGHT: 126.7 LBS | DIASTOLIC BLOOD PRESSURE: 60 MMHG | BODY MASS INDEX: 24.74 KG/M2

## 2021-09-22 DIAGNOSIS — M81.0 SENILE OSTEOPOROSIS: Primary | ICD-10-CM

## 2021-09-22 PROBLEM — L02.92 FURUNCLE: Status: ACTIVE | Noted: 2021-09-22

## 2021-09-22 PROBLEM — R42 LIGHTHEADEDNESS: Status: ACTIVE | Noted: 2021-09-22

## 2021-09-22 PROCEDURE — 99213 OFFICE O/P EST LOW 20 MIN: CPT | Performed by: NURSE PRACTITIONER

## 2021-09-22 NOTE — PROGRESS NOTES
[facility]  ENDOCRINOLOGY    Osteoporosis Follow Up 9/22/2021    Maria D Moore, 11/24/1934, [id number]          Reason for visit      1. Osteoporosis Senile        History     Maria D Moore is a very pleasant 86 year old old female who presents for follow up.   SUMMARY:  As you know, she took a fall when she became syncopal with atrial fibrillation on August 13th, 2010. Initially plain radiographs were negative but with persistent pain. A subsequent CT scan on September 26th, 2010, did show the sacral insufficiency fractures both sacral wings extending into the S2 sacral body and nondisplaced fracture of the right pubic bone. MRI in September 2010 confirmed these findings. A followup CT done on March 9 2011, indicates again a broad based bilateral sacral insufficiency fractures and ununited fracture of the right pubic bone. She is here for further recommendations. I should note that at the time she was hospitalized with her fractures, she was told that her vitamin D level was low and she was treated with 87505 International Units of vitamin D weekly for three months. Subsequently, her level chuck just above 50 ng/mL has had serious  pelvic fractures while on bisphosphonates for at least six years. A 78-year-old woman with severe osteoporosis who has completed 2 years of teriparatide.  We discussed options for following with antiresorptive agent and I do think that denosumab would be preferable for her and she was in agreement with this plan.    TODAY:  Maria D is here today in follow-up for Osteoporosis. She has been getting Prolia injections every 6 months since 2013. She has had no problems with them. Her last Dexa Scan showed both improvement and stability, both of which are positive responses to therapy. She had one fall in the last year. She reports a bump on her head and a skin tear, but no fractures. Current Vit D level is 64 and Calcium level is 9.1.      Risk Factors         Past Medical  History     Patient Active Problem List   Diagnosis     Fatigue     Temperature Intolerance To Cold   (Consistent)     Abnormal Blood Chemistry     Osteoarthritis Of The Ankle/Foot (Multiple Joints)     Mixed hyperlipidemia     Furuncle     Generalized Osteoarthritis Of Multiple Sites     Edema     Osteoporosis Senile     Vaginal Discharge (Symptom)     Back Pain     Automatic Atrial Tachycardia     Lightheadedness     SVT (supraventricular tachycardia) (H)     Hypotension, unspecified hypotension type     Actinic keratosis     Hair thinning     Free intraperitoneal air     Duodenal ulcer with perforation (H)     Hiatal hernia     S/P exploratory laparotomy     Perforated duodenal ulcer (H)     Hypothyroidism     Furuncle     Lightheadedness       Family History       family history includes Cerebrovascular Disease in her father.    Social History      reports that she has never smoked. She has never used smokeless tobacco. She reports that she does not drink alcohol and does not use drugs.      Review of Systems     Patient denies current pain, limited mobility, fractures.   Remainder per HPI.      Vital Signs     /60 (BP Location: Right arm, Patient Position: Sitting, Cuff Size: Adult Regular)   Pulse 64   Wt 57.5 kg (126 lb 11.2 oz)   BMI 24.74 kg/m      Physical Exam     Constitutional:  Well developed, Well nourished  HENT:  Normocephalic,   Neck: normal in appearance  Eyes:  PERRL, Conjunctiva pink  Respiratory:  No respiratory distress  Skin: No acanthosis nigricans, lipoatrophy or lipodystrophy  Neurologic:  Alert & oriented x 3, nonfocal  Psychiatric:  Affect, Mood, Insight appropriate          Assessment     1. Osteoporosis Senile        Plan     Maria D is stable on the Prolia injections. She will get another Dexa scan done in 1 year and f/u with me afterwards.         Deisy Mckeon NP  [facility]  9/22/2021  8:44 AM        Current Medications     Outpatient Medications Prior to Visit    Medication Sig Dispense Refill     aspirin 81 MG EC tablet [ASPIRIN 81 MG EC TABLET] Take 81 mg by mouth daily.       calcium carbonate (OS-DOMONIQUE) 600 mg (1,500 mg) tablet [CALCIUM CARBONATE (OS-DOMONIQUE) 600 MG (1,500 MG) TABLET] Take 600 mg by mouth every other day.              cholecalciferol, vitamin D3, 5,000 unit Tab [CHOLECALCIFEROL, VITAMIN D3, 5,000 UNIT TAB] Take by mouth. Take 1 tablet daily.       levothyroxine (SYNTHROID/LEVOTHROID) 25 MCG tablet [LEVOTHYROXINE (SYNTHROID, LEVOTHROID) 25 MCG TABLET] TAKE 1 TABLET DAILY AT 6:00 A.M. 90 tablet 3     metoprolol succinate ER (TOPROL-XL) 50 MG 24 hr tablet Take 1 tablet (50 mg) by mouth At Bedtime 90 tablet 3     niacin 500 MG tablet [NIACIN 500 MG TABLET] Take 500 mg by mouth daily with breakfast.       OMEGA-3/DHA/EPA/FISH OIL (FISH OIL-OMEGA-3 FATTY ACIDS) 300-1,000 mg capsule [OMEGA-3/DHA/EPA/FISH OIL (FISH OIL-OMEGA-3 FATTY ACIDS) 300-1,000 MG CAPSULE] Take 2,000 mg by mouth daily.        pantoprazole (PROTONIX) 20 MG tablet [PANTOPRAZOLE (PROTONIX) 20 MG TABLET] Take 20 mg by mouth 2 (two) times a day.       simvastatin (ZOCOR) 20 MG tablet [SIMVASTATIN (ZOCOR) 20 MG TABLET] TAKE 1 TABLET DAILY AT BEDTIME 90 tablet 3     VIT C/E/ZN/COPPR/LUTEIN/ZEAXAN (PRESERVISION AREDS 2 ORAL) [VIT C/E/ZN/COPPR/LUTEIN/ZEAXAN (PRESERVISION AREDS 2 ORAL)] Take 1 tablet by mouth 2 (two) times a day.       No facility-administered medications prior to visit.         Lab Results     TSH   Date Value Ref Range Status   04/08/2021 1.64 0.30 - 5.00 uIU/mL Final           Imaging Results   Last DEXA scan:  No valid procedures specified.

## 2021-10-27 ENCOUNTER — ANCILLARY PROCEDURE (OUTPATIENT)
Dept: MAMMOGRAPHY | Facility: CLINIC | Age: 86
End: 2021-10-27
Attending: FAMILY MEDICINE
Payer: COMMERCIAL

## 2021-10-27 ENCOUNTER — TELEPHONE (OUTPATIENT)
Dept: FAMILY MEDICINE | Facility: CLINIC | Age: 86
End: 2021-10-27

## 2021-10-27 DIAGNOSIS — Z12.31 VISIT FOR SCREENING MAMMOGRAM: ICD-10-CM

## 2021-10-27 PROCEDURE — 77067 SCR MAMMO BI INCL CAD: CPT

## 2021-12-11 ENCOUNTER — HEALTH MAINTENANCE LETTER (OUTPATIENT)
Age: 86
End: 2021-12-11

## 2021-12-31 DIAGNOSIS — M81.0 SENILE OSTEOPOROSIS: Primary | ICD-10-CM

## 2021-12-31 DIAGNOSIS — Z92.29 PERSONAL HISTORY OF OTHER DRUG THERAPY: ICD-10-CM

## 2022-01-03 ENCOUNTER — TELEPHONE (OUTPATIENT)
Dept: CARDIOLOGY | Facility: CLINIC | Age: 87
End: 2022-01-03
Payer: COMMERCIAL

## 2022-01-03 NOTE — TELEPHONE ENCOUNTER
----- Message from Tabby Glez sent at 1/3/2022  8:28 AM CST -----  Regarding: HEIDY PT  General phone call:    Caller: NATALIA   Primary cardiologist: HEIDY   Detailed reason for call: She just got a Fitbit and her pulse after walking is around 110.     Best phone number: [phone]   Best time to contact: ANY  Ok to leave a detailedmessage? YES  Device? NO    Additional Info:

## 2022-01-03 NOTE — TELEPHONE ENCOUNTER
Reached out to patient to discuss her concerns. She got a new Fit-Bit for [employer] and she noticed her HR was 110 after walking and she wants it addressed. Discussed with patient she should expect an elevation in her HR with activity and to continue monitoring. Her HR does come down after rest. She denies associated shortness of breath, lightheadedness, dizziness, palpittions and chest tightness. Patient is feeling well and is reassured and thankful for the call. Follow up as planned in May. Encouraged patient to call in the interim with any further questions or concerns.

## 2022-01-11 ENCOUNTER — ALLIED HEALTH/NURSE VISIT (OUTPATIENT)
Dept: ENDOCRINOLOGY | Facility: CLINIC | Age: 87
End: 2022-01-11
Payer: COMMERCIAL

## 2022-01-11 DIAGNOSIS — M81.0 SENILE OSTEOPOROSIS: Primary | ICD-10-CM

## 2022-01-11 PROCEDURE — 96372 THER/PROPH/DIAG INJ SC/IM: CPT | Performed by: NURSE PRACTITIONER

## 2022-01-11 PROCEDURE — 99207 PR NO CHARGE NURSE ONLY: CPT

## 2022-01-11 NOTE — PROGRESS NOTES
"Prolia Injection Phone Screen      Screening questions have been asked 2-3 days prior to administration visit for Prolia. If any questions are answered with \"Yes,\" this phone encounter were will routed to ordering provider for further evaluation.     1.  When was the last injection?  7/6/21    2.  Has insurance for this injection been verified?  Yes    3.  Did you experience any new onset achiness or rashes that lasted for over a month with your previous Prolia injection?   No    4.  Do you have a fever over 101?F or a new deep cough that is unusual for you today? No    5.  Have you started any new medications in the last 6 months that you were told could affect your immune system? These may have been prescribed by oncologist, transplant, rheumatology, or dermatology.   No    6.  In the last 6 months have you have gastric bypass or parathyroid surgery?   No    7.  Do you plan dental work requiring drilling into the bone such as implants/extractions or oral surgery in the next 2-3 months?   No    8. Do you have new insurance since the last injection?    Patient informed if symptoms discussed above present prior to their administration appointment, they are to notify clinic immediately.     Minda Kim RN          The following steps were completed to comply with the REMS program for Prolia:  1. Ordering provider has previously reviewed information in the Medication Guide and Patient Counseling Chart, including the serious risks of Prolia  and the symptoms of each risk and have been advised to seek prompt medical attention if they have signs or symptoms of any of the serious risks.  2. Provided each patient a copy of the Medication Guide and Patient Brochure.  See MAR for administration details.   Indication: Prolia  (denosumab) is a prescription medicine used to treat osteoporosis in patients who:   Are at high risk for fracture, meaning patients who have had a fracture related to osteoporosis, or who have " multiple risk factors for fracture; Cannot use another osteoporosis medicine or other osteoporosis medicines did not work well.   The timeline for early/late injections would be 4 weeks early and any time after the 6 month nomi. If a patient receives their injection late, then the subsequent injection would be 6 months from the date that they actually received the injection    Have the screening questions been asked prior to this administration? Yes        The following medication was given:     MEDICATION: Denosumab (Prolia) 60 mg/ml SOLN  ROUTE: SQ  SITE: Arm - Right  DOSE: 60 mg  LOT #: [id number]  :  [employer] (Closed)  EXPIRATION DATE:  02/2024  NDC#: see mar

## 2022-01-21 DIAGNOSIS — K26.5 DUODENAL ULCER WITH PERFORATION (H): Primary | ICD-10-CM

## 2022-01-21 NOTE — TELEPHONE ENCOUNTER
Reason for Call:  Medication or medication refill:    Do you use a [facility]?  Name of the [facility] and phone number [facility] the [facility]     Name of the medication requested:     pantoprazole (PROTONIX) 20 MG tablet  Other request: na    Can we leave a detailed message on this number? YES    Phone number patient can be reached at: Home number on file [phone] (home)    Best Time: any    Call taken on 1/21/2022 at 3:45 PM by MALCOLM BUENO

## 2022-01-23 RX ORDER — PANTOPRAZOLE SODIUM 20 MG/1
20 TABLET, DELAYED RELEASE ORAL 2 TIMES DAILY
Qty: 90 TABLET | Refills: 3 | Status: SHIPPED | OUTPATIENT
Start: 2022-01-23 | End: 2022-02-16 | Stop reason: DRUGHIGH

## 2022-02-03 ENCOUNTER — TELEPHONE (OUTPATIENT)
Dept: NURSING | Facility: CLINIC | Age: 87
End: 2022-02-03
Payer: COMMERCIAL

## 2022-02-03 NOTE — TELEPHONE ENCOUNTER
Telephone call:    Pt reports receiving wrong dose of protonix from pharmacy from new med order.  Per previous order, pt reports taking 40 mg one tablet two times a day due to having a perforated ulcer last year per pt.  The new order is for 20 mg one tablet two times a day.  Message routed to provider.    Tabby Martinez RN  02/03/22 8:11 AM  [facility] Nurse Advisor

## 2022-02-03 NOTE — TELEPHONE ENCOUNTER
Spoke to pt and relayed MD message. Pt upset because she said its supposed to be 40mg BID. I told her the only record we have is 20mg BID. I asked her to follow up with GI. She says they are not the ones that have been prescribing it. I checked our [facility] system as well and med is listed as historical in there so not sure where else she would have been getting it. [facility] Rx's have been coming from. I told her she could just ask her GI doctor about it but she insisted that she is supposed to be on this dose for the rest of her life

## 2022-02-03 NOTE — TELEPHONE ENCOUNTER
Called and spoke with [facility]. Pt has called them twice today as well. They are waiting to verify dose with provider as they have only seen pt once and it was when she was in the hospital. They will call us back once they verify with provider what the dose should be ongoing

## 2022-02-03 NOTE — TELEPHONE ENCOUNTER
Please inform patient that I do not have records of pantoprazole 40 mg 2 times a day.  Perhaps this comes from the gastroenterology specialist.  Can she call the clinic to see if it can be filled.  Meanwhile she can take 2 tablets of the current prescription provided.      Patient is due for med check visit as I last saw February 2021.  Please also inform her to schedule follow-up appointment    Flex Bain MD

## 2022-02-03 NOTE — TELEPHONE ENCOUNTER
Incoming call from patient in tears wanting to talk to a nurse. She states that on her visit from 04/08/21 it states 40 mg. I let her know that I spoke to my clinical assistant, she can take two 20 mg tabs to make a total of 40 mg. And I also relayed Dr. ENCARNACION's message to pt, but pt is requesting a nurse to call her back today. Pt states that she hasnt followed up with [facility] because she was told everything is ok and does not need to follow up.     Patient is asking if someone can just call her. I let her know I will send a message to the provider's team. Pt states she will wait for a call.

## 2022-02-03 NOTE — TELEPHONE ENCOUNTER
Incoming call from patient following up if provider sent the rx. I let her know the provider hasnt address it yet; she's seeing pts and will try to address her messages in between patients. I let pt know I will let provider's CA know and have her address it. There was a previous message sent as well. Pt is requesting a call back to her to let her know ASAP today.

## 2022-02-03 NOTE — TELEPHONE ENCOUNTER
Pt calling as she takes 40mg 2x per day-   Would like Rx sent to [facility] [place] [place] - [facility]   Then would like a call to notify her when sent as she is very worried about it getting done today

## 2022-02-03 NOTE — TELEPHONE ENCOUNTER
Patient calling saying she has a printout from an office visit back on 4/08/21 that says she was taking 40 mg pantoprazole.   Please call patient back to advise

## 2022-02-03 NOTE — TELEPHONE ENCOUNTER
Please can you ask [facility] to send the last evaluation.  If it can be faxed to me and based on their recommendation, I will send a new prescription for 40 mg.  This should be of no issue.  Please reassure patient    Flex Bain MD

## 2022-02-04 ENCOUNTER — TELEPHONE (OUTPATIENT)
Dept: FAMILY MEDICINE | Facility: CLINIC | Age: 87
End: 2022-02-04
Payer: COMMERCIAL

## 2022-02-04 NOTE — TELEPHONE ENCOUNTER
Reason for Call:  Other appointment    Detailed comments: GURU calling to let Dr ENCARNACION has follow up EGD for ram's esophagus 4/4/2022    Also has hiatal hernia & ram's esophagus- pt had perforated ulcer which required surgery, cause not identified so given information it would be reasonable to continue with pantoprazole 40mg po BID  [facility] did send in Rx for pt    Phone Number Patient can be reached at: Other phone number:  [phone]*    Best Time: na    Can we leave a detailed message on this number? YES    Call taken on 2/4/2022 at 9:39 AM by Belén Tavera

## 2022-02-15 ENCOUNTER — NURSE TRIAGE (OUTPATIENT)
Dept: NURSING | Facility: CLINIC | Age: 87
End: 2022-02-15
Payer: COMMERCIAL

## 2022-02-15 NOTE — TELEPHONE ENCOUNTER
Diarrhea ;ast night  Patient reports   Lost 2 lbs overnight she reports.   patient reports she ate a TV dinner the other night for dinner, and then the diarrhea started last night.    Patient states it is very liquid, and that it just comes; she has no control over it.  Patient reports , no cramping or stomach pain at all.  Patient advised to get some kaopectate, imodium or pepto bismol.    Patient also was advised to start on a   BRAT diet.    And she was advised that because she is over 60 years old , she is at greater risk for having dehydration., and she should go to ER.  Natalie Murray RN   [facility] Triage/refill nurse    Reason for Disposition    SEVERE diarrhea (e.g., 7 or more times / day more than normal) and age > 60 years    Additional Information    Negative: Shock suspected (e.g., cold/pale/clammy skin, too weak to stand, low BP, rapid pulse)    Negative: Difficult to awaken or acting confused (e.g., disoriented, slurred speech)    Negative: Sounds like a life-threatening emergency to the triager    Negative: Vomiting also present and worse than the diarrhea    Negative: Blood in stool and without diarrhea    Negative: SEVERE abdominal pain (e.g., excruciating) and present > 1 hour    Negative: SEVERE abdominal pain and age > 60    Negative: Bloody, black, or tarry bowel movements (Exception: chronic-unchanged black-grey bowel movements and is taking iron pills or Pepto-bismol)    Protocols used: DIARRHEA-A-OH

## 2022-02-16 ENCOUNTER — NURSE TRIAGE (OUTPATIENT)
Dept: NURSING | Facility: CLINIC | Age: 87
End: 2022-02-16
Payer: COMMERCIAL

## 2022-02-16 ENCOUNTER — OFFICE VISIT (OUTPATIENT)
Dept: FAMILY MEDICINE | Facility: CLINIC | Age: 87
End: 2022-02-16
Payer: COMMERCIAL

## 2022-02-16 VITALS
DIASTOLIC BLOOD PRESSURE: 64 MMHG | RESPIRATION RATE: 16 BRPM | BODY MASS INDEX: 24.1 KG/M2 | SYSTOLIC BLOOD PRESSURE: 101 MMHG | WEIGHT: 123.4 LBS | HEART RATE: 62 BPM

## 2022-02-16 DIAGNOSIS — R19.7 DIARRHEA, UNSPECIFIED TYPE: Primary | ICD-10-CM

## 2022-02-16 DIAGNOSIS — M81.0 SENILE OSTEOPOROSIS: ICD-10-CM

## 2022-02-16 DIAGNOSIS — I47.10 SVT (SUPRAVENTRICULAR TACHYCARDIA) (H): ICD-10-CM

## 2022-02-16 PROCEDURE — 99214 OFFICE O/P EST MOD 30 MIN: CPT | Performed by: FAMILY MEDICINE

## 2022-02-16 RX ORDER — PANTOPRAZOLE SODIUM 40 MG/1
TABLET, DELAYED RELEASE ORAL
COMMUNITY
Start: 2021-11-30 | End: 2022-03-31

## 2022-02-16 NOTE — TELEPHONE ENCOUNTER
Patient is calling and states on Monday in afternoon started with diarrhea and no control.  Not eating much since then.  Yesterday morning the diarrhea stopped.  Then at supper bannanas and then diarrhea started again.  Patient has tried imodium.  Patient is staying hydrated and denies fever cough and shortness of breath.  Patient will continue to monitor symptoms.  Patient does not want to be seen in clinic.  Patient states that she will wait and go to ED if necessary.    COVID 19 Nurse Triage Plan/Patient Instructions    Please be aware that novel coronavirus (COVID-19) may be circulating in the community. If you develop symptoms such as fever, cough, or SOB or if you have concerns about the presence of another infection including coronavirus (COVID-19), please contact your health care provider or visit [email or web address].     Disposition/Instructions    In-Person Visit with provider recommended. Reference Visit Selection Guide.    Thank you for taking steps to prevent the spread of this virus.  o Limit your contact with others.  o Wear a simple mask to cover your cough.  o Wash your hands well and often.    Resources    [facility]: About COVID-19: [email or web address]    CDC: What to Do If You're Sick: www.cdc.gov/coronavirus/2019-ncov/about/steps-when-sick.html    CDC: Ending Home Isolation: www.cdc.gov/coronavirus/2019-ncov/hcp/disposition-in-home-patients.html     CDC: Caring for Someone: www.cdc.gov/coronavirus/2019-ncov/if-you-are-sick/care-for-someone.html     [facility]: Interim Guidance for Hospital Discharge to Home: www.health[facility]/diseases/coronavirus/hcp/hospdischarge.pdf    [facility] clinical trials (COVID-19 research studies): [email or web address]     Below are the COVID-19 hotlines at the [place] [facility] ([facility]). Interpreters are available.   o For health questions: Call 362-657-5924 or 1-205.758.9178 (7 a.m. to 7  p.m.)  o For questions about schools and childcare: Call 198-620-5288 or 1-967.785.6114 (7 a.m. to 7 p.m.)                       Reason for Disposition    [1] MODERATE diarrhea (e.g., 4-6 times / day more than normal) AND [2] present > 48 hours (2 days)    Additional Information    Negative: Shock suspected (e.g., cold/pale/clammy skin, too weak to stand, low BP, rapid pulse)    Negative: Difficult to awaken or acting confused (e.g., disoriented, slurred speech)    Negative: Sounds like a life-threatening emergency to the triager    Negative: [1] SEVERE abdominal pain (e.g., excruciating) AND [2] present > 1 hour    Negative: [1] SEVERE abdominal pain AND [2] age > 60    Negative: [1] Blood in the stool AND [2] moderate or large amount of blood    Negative: Black or tarry bowel movements  (Exception: chronic-unchanged  black-grey bowel movements AND is taking iron pills or Pepto-bismol)    Negative: [1] Drinking very little AND [2] dehydration suspected (e.g., no urine > 12 hours, very dry mouth, very lightheaded)    Negative: Patient sounds very sick or weak to the triager    Negative: [1] SEVERE diarrhea (e.g., 7 or more times / day more than normal) AND [2] age > 60 years    Negative: [1] Constant abdominal pain AND [2] present > 2 hours    Negative: [1] Fever > 103 F (39.4 C) AND [2] not able to get the fever down using Fever Care Advice    Negative: [1] SEVERE diarrhea (e.g., 7 or more times / day more than normal) AND [2] present > 24 hours (1 day)    Protocols used: DIARRHEA-A-AH

## 2022-02-16 NOTE — PATIENT INSTRUCTIONS
Maria D,    Make sure to drink plenty of liquids  I recommend avoiding dairy products in the short-term  You can consider something to replace electrolytes like diluted Gatorade  If having ongoing diarrhea I recommend that you check the cultures as we discussed  If developing severe dehydration and feeling weak then I recommend presenting to the emergency department  Please follow-up if having ongoing symptoms    Alec Steele MD

## 2022-02-22 DIAGNOSIS — Z11.59 ENCOUNTER FOR SCREENING FOR OTHER VIRAL DISEASES: Primary | ICD-10-CM

## 2022-02-27 NOTE — PROGRESS NOTES
Assessment/ Plan     1. Diarrhea, unspecified type  Consider possible viral etiology  C. difficile possible but less likely    Symptoms may be resolving  Coverage fluids and rest  Recommend electrolyte replacement  If having persisten diarrheal symptoms check stool cultures, C. difficile, and for ova and parasites  She will continue Protonix  She does have an upper endoscopy planned in the future  Recommend immediate follow-up if developing severe symptoms of dehydration    - Enteric Bacteria and Virus Panel by DANITA Stool; Future  - Ova and Parasite Exam Routine; Future  - Clostridium difficile Toxin B PCR; Future  - Enteric Bacteria and Virus Panel by DANITA Stool  - Clostridium difficile Toxin B PCR    2. Senile osteoporosis    Check laboratory testing as requested by patient given osteoporosis  Recommend adequate calcium and vitamin D  She has been treated with Prolia as directed by Todd Mckeon    Follow-up with  - Vitamin D Deficiency; Future  - Calcium; Future    3.  SVT  Status post ablation    Continue metoprolol as recommended cardiology  Follow-up with cardiology as needed    4.  Multiple skin lesions    She has had multiple lesions frozen with liquid nitrogen previously  Follow-up as needed with dermatology    Spent 35 minutes including time for chart review as well as time with patient and reviewing the treatment plan as noted      Subjective:      Maria D Moore is a 87 year old female who presents to the clinic with a recent history of bowel changes.  She notes that over the past two days she has had diarrhea characterized by loose to watery stools.  She estimates that she had approximately 12 liquid stools yesterday.  She has had mild abdominal discomfort.  She denies nausea or vomiting.  She has not had a fever.  She denies severe abdominal pain.  She notes that she did eat a TV dinner recently but cannot think of any unusual exposures.  She has not had a bowel movement since very early this  morning.  That was characterized by brownish water pressure not receive recent antibiotics. Her diarrhea has lessened significantly. She denies weakness or lethargy.     Her medical history is notable for a perforated duodenal ulcer in the past.  She did require surgical correction and has been treated with pantoprazole.  She will have a follow-up upper endoscopy in the future.    Her medical history is otherwise notable for osteoporosis, hypothyroidism, and supraventricular tachycardia with previous ablation.  She has followed up with cardiology and continues to be treated with metoprolol.  She continues to take level thyroxine as well.    Finally, she does have skin lesions which previously have been treated by dermatology with liquid nitrogen.    The following portions of the patient's history were reviewed and updated as appropriate: allergies, current medications, past family history, past medical history, past social history, past surgical history and problem list. Medications have been reconciled    Review of Systems   A 12 point comprehensive review of systems was negative except as noted.      Current Outpatient Medications   Medication Sig Dispense Refill     aspirin 81 MG EC tablet [ASPIRIN 81 MG EC TABLET] Take 81 mg by mouth daily.       calcium carbonate (OS-DOMONIQUE) 600 mg (1,500 mg) tablet [CALCIUM CARBONATE (OS-DOMONIQUE) 600 MG (1,500 MG) TABLET] Take 600 mg by mouth every other day.              cholecalciferol, vitamin D3, 5,000 unit Tab [CHOLECALCIFEROL, VITAMIN D3, 5,000 UNIT TAB] Take by mouth. Take 1 tablet daily.       levothyroxine (SYNTHROID/LEVOTHROID) 25 MCG tablet [LEVOTHYROXINE (SYNTHROID, LEVOTHROID) 25 MCG TABLET] TAKE 1 TABLET DAILY AT 6:00 A.M. 90 tablet 3     metoprolol succinate ER (TOPROL-XL) 50 MG 24 hr tablet Take 1 tablet (50 mg) by mouth At Bedtime 90 tablet 3     niacin 500 MG tablet [NIACIN 500 MG TABLET] Take 500 mg by mouth daily with breakfast.       OMEGA-3/DHA/EPA/FISH OIL  (FISH OIL-OMEGA-3 FATTY ACIDS) 300-1,000 mg capsule [OMEGA-3/DHA/EPA/FISH OIL (FISH OIL-OMEGA-3 FATTY ACIDS) 300-1,000 MG CAPSULE] Take 2,000 mg by mouth daily.        pantoprazole (PROTONIX) 40 MG EC tablet        simvastatin (ZOCOR) 20 MG tablet [SIMVASTATIN (ZOCOR) 20 MG TABLET] TAKE 1 TABLET DAILY AT BEDTIME 90 tablet 3     VIT C/E/ZN/COPPR/LUTEIN/ZEAXAN (PRESERVISION AREDS 2 ORAL) [VIT C/E/ZN/COPPR/LUTEIN/ZEAXAN (PRESERVISION AREDS 2 ORAL)] Take 1 tablet by mouth 2 (two) times a day.         Objective:     /64 (BP Location: Left arm, Patient Position: Sitting, Cuff Size: Adult Regular)   Pulse 62   Resp 16   Wt 56 kg (123 lb 6.4 oz)   BMI 24.10 kg/m      General appearance: alert, appears stated age   Head: normocephalic, without obvious abnormality, atraumatic  Eyes: conjunctivae/corneas clear. PERRL, EOM's intact.   Lungs: clear to auscultation bilaterally  Heart: regular rate and rhythm, S1, S2 normal, no murmur, click, rub or gallop  Abdomen: soft, non-tender; no masses,  no organomegaly  Extremities: extremities normal, atraumatic, no cyanosis or edema  Skin: skin color, texture, turgor normal  Lymph nodes: Cervical nodes normal.  Neurologic: Alert and oriented X 3           No results found for this or any previous visit (from the past 168 hour(s)).       This note has been dictated using voice recognition software. Any grammatical or context distortions are unintentional and inherent to the software    Alec Steele MD  Answers for HPI/ROS submitted by the patient on 2/16/2022  How many servings of fruits and vegetables do you eat daily?: 0-1  On average, how many sweetened beverages do you drink each day (Examples: soda, juice, sweet tea, etc.  Do NOT count diet or artificially sweetened beverages)?: 0  How many minutes a day do you exercise enough to make your heart beat faster?: 30 to 60  How many days per week do you miss taking your medication?: 0  What is the reason for your visit  today?: Diarrhea  When did your symptoms begin?: 1-3 days ago

## 2022-03-31 ENCOUNTER — OFFICE VISIT (OUTPATIENT)
Dept: FAMILY MEDICINE | Facility: CLINIC | Age: 87
End: 2022-03-31
Payer: COMMERCIAL

## 2022-03-31 VITALS
SYSTOLIC BLOOD PRESSURE: 115 MMHG | WEIGHT: 127.4 LBS | HEART RATE: 63 BPM | HEIGHT: 61 IN | DIASTOLIC BLOOD PRESSURE: 64 MMHG | RESPIRATION RATE: 16 BRPM | BODY MASS INDEX: 24.05 KG/M2

## 2022-03-31 DIAGNOSIS — I47.10 SVT (SUPRAVENTRICULAR TACHYCARDIA) (H): ICD-10-CM

## 2022-03-31 DIAGNOSIS — R79.89 OTHER SPECIFIED ABNORMAL FINDINGS OF BLOOD CHEMISTRY: ICD-10-CM

## 2022-03-31 DIAGNOSIS — K26.5 DUODENAL ULCER WITH PERFORATION (H): ICD-10-CM

## 2022-03-31 DIAGNOSIS — Z01.818 PREOP GENERAL PHYSICAL EXAM: Primary | ICD-10-CM

## 2022-03-31 DIAGNOSIS — E78.5 HYPERLIPIDEMIA, UNSPECIFIED HYPERLIPIDEMIA TYPE: ICD-10-CM

## 2022-03-31 DIAGNOSIS — M81.0 SENILE OSTEOPOROSIS: ICD-10-CM

## 2022-03-31 DIAGNOSIS — E03.9 HYPOTHYROIDISM, UNSPECIFIED TYPE: ICD-10-CM

## 2022-03-31 DIAGNOSIS — Z11.59 ENCOUNTER FOR SCREENING FOR OTHER VIRAL DISEASES: ICD-10-CM

## 2022-03-31 LAB
ALBUMIN SERPL-MCNC: 3.7 G/DL (ref 3.5–5)
ALP SERPL-CCNC: 70 U/L (ref 45–120)
ALT SERPL W P-5'-P-CCNC: 12 U/L (ref 0–45)
ANION GAP SERPL CALCULATED.3IONS-SCNC: 13 MMOL/L (ref 5–18)
AST SERPL W P-5'-P-CCNC: 22 U/L (ref 0–40)
BASOPHILS # BLD AUTO: 0.1 10E3/UL (ref 0–0.2)
BASOPHILS NFR BLD AUTO: 1 %
BILIRUB SERPL-MCNC: 0.8 MG/DL (ref 0–1)
BUN SERPL-MCNC: 14 MG/DL (ref 8–28)
CALCIUM SERPL-MCNC: 10.4 MG/DL (ref 8.5–10.5)
CALCIUM SERPL-MCNC: 10.4 MG/DL (ref 8.5–10.5)
CHLORIDE BLD-SCNC: 106 MMOL/L (ref 98–107)
CHOLEST SERPL-MCNC: 187 MG/DL
CO2 SERPL-SCNC: 24 MMOL/L (ref 22–31)
CREAT SERPL-MCNC: 0.91 MG/DL (ref 0.6–1.1)
DEPRECATED CALCIDIOL+CALCIFEROL SERPL-MC: 82 UG/L (ref 20–75)
EOSINOPHIL # BLD AUTO: 0.5 10E3/UL (ref 0–0.7)
EOSINOPHIL NFR BLD AUTO: 7 %
ERYTHROCYTE [DISTWIDTH] IN BLOOD BY AUTOMATED COUNT: 13.2 % (ref 10–15)
FASTING STATUS PATIENT QL REPORTED: YES
GFR SERPL CREATININE-BSD FRML MDRD: 61 ML/MIN/1.73M2
GLUCOSE BLD-MCNC: 83 MG/DL (ref 70–125)
HCT VFR BLD AUTO: 40.5 % (ref 35–47)
HDLC SERPL-MCNC: 83 MG/DL
HGB BLD-MCNC: 13.6 G/DL (ref 11.7–15.7)
IMM GRANULOCYTES # BLD: 0 10E3/UL
IMM GRANULOCYTES NFR BLD: 0 %
LDLC SERPL CALC-MCNC: 89 MG/DL
LYMPHOCYTES # BLD AUTO: 1.7 10E3/UL (ref 0.8–5.3)
LYMPHOCYTES NFR BLD AUTO: 23 %
MCH RBC QN AUTO: 30.7 PG (ref 26.5–33)
MCHC RBC AUTO-ENTMCNC: 33.6 G/DL (ref 31.5–36.5)
MCV RBC AUTO: 91 FL (ref 78–100)
MONOCYTES # BLD AUTO: 0.6 10E3/UL (ref 0–1.3)
MONOCYTES NFR BLD AUTO: 8 %
NEUTROPHILS # BLD AUTO: 4.3 10E3/UL (ref 1.6–8.3)
NEUTROPHILS NFR BLD AUTO: 61 %
PLATELET # BLD AUTO: 178 10E3/UL (ref 150–450)
POTASSIUM BLD-SCNC: 4.4 MMOL/L (ref 3.5–5)
PROT SERPL-MCNC: 6.6 G/DL (ref 6–8)
RBC # BLD AUTO: 4.43 10E6/UL (ref 3.8–5.2)
SODIUM SERPL-SCNC: 143 MMOL/L (ref 136–145)
TRIGL SERPL-MCNC: 75 MG/DL
TSH SERPL DL<=0.005 MIU/L-ACNC: 2.62 UIU/ML (ref 0.3–5)
WBC # BLD AUTO: 7.1 10E3/UL (ref 4–11)

## 2022-03-31 PROCEDURE — 84443 ASSAY THYROID STIM HORMONE: CPT | Performed by: FAMILY MEDICINE

## 2022-03-31 PROCEDURE — U0005 INFEC AGEN DETEC AMPLI PROBE: HCPCS | Performed by: FAMILY MEDICINE

## 2022-03-31 PROCEDURE — 36415 COLL VENOUS BLD VENIPUNCTURE: CPT | Performed by: FAMILY MEDICINE

## 2022-03-31 PROCEDURE — U0003 INFECTIOUS AGENT DETECTION BY NUCLEIC ACID (DNA OR RNA); SEVERE ACUTE RESPIRATORY SYNDROME CORONAVIRUS 2 (SARS-COV-2) (CORONAVIRUS DISEASE [COVID-19]), AMPLIFIED PROBE TECHNIQUE, MAKING USE OF HIGH THROUGHPUT TECHNOLOGIES AS DESCRIBED BY CMS-2020-01-R: HCPCS | Performed by: FAMILY MEDICINE

## 2022-03-31 PROCEDURE — 99214 OFFICE O/P EST MOD 30 MIN: CPT | Performed by: FAMILY MEDICINE

## 2022-03-31 PROCEDURE — 80053 COMPREHEN METABOLIC PANEL: CPT | Performed by: FAMILY MEDICINE

## 2022-03-31 PROCEDURE — 82306 VITAMIN D 25 HYDROXY: CPT | Performed by: FAMILY MEDICINE

## 2022-03-31 PROCEDURE — 85025 COMPLETE CBC W/AUTO DIFF WBC: CPT | Performed by: FAMILY MEDICINE

## 2022-03-31 PROCEDURE — 80061 LIPID PANEL: CPT | Performed by: FAMILY MEDICINE

## 2022-03-31 RX ORDER — SIMVASTATIN 20 MG
TABLET ORAL
Qty: 90 TABLET | Refills: 3 | Status: SHIPPED | OUTPATIENT
Start: 2022-03-31 | End: 2023-03-13

## 2022-03-31 RX ORDER — PANTOPRAZOLE SODIUM 40 MG/1
40 TABLET, DELAYED RELEASE ORAL 2 TIMES DAILY
Qty: 180 TABLET | Refills: 3 | Status: SHIPPED | OUTPATIENT
Start: 2022-03-31 | End: 2023-03-13

## 2022-03-31 RX ORDER — METOPROLOL SUCCINATE 50 MG/1
50 TABLET, EXTENDED RELEASE ORAL AT BEDTIME
Qty: 90 TABLET | Refills: 3 | Status: SHIPPED | OUTPATIENT
Start: 2022-03-31 | End: 2023-03-13

## 2022-03-31 RX ORDER — LEVOTHYROXINE SODIUM 25 UG/1
TABLET ORAL
Qty: 90 TABLET | Refills: 3 | Status: SHIPPED | OUTPATIENT
Start: 2022-03-31 | End: 2023-03-13

## 2022-03-31 NOTE — PATIENT INSTRUCTIONS
Preparing for Your Surgery  Getting started  A nurse will call you to review your health history and instructions. They will give you an arrival time based on your scheduled surgery time. Please be ready to share:    Your doctor's clinic name and phone number    Your medical, surgical and anesthesia history    A list of allergies and sensitivities    A list of medicines, including herbal treatments and over-the-counter drugs    Whether the patient has a legal guardian (ask how to send us the papers in advance)  Please tell us if you're pregnant--or if there's any chance you might be pregnant. Some surgeries may injure a fetus (unborn baby), so they require a pregnancy test. Surgeries that are safe for a fetus don't always need a test, and you can choose whether to have one.   If you have a child who's having surgery, please ask for a copy of Preparing for Your Child's Surgery.    Preparing for surgery    Within 30 days of surgery: Have a pre-op exam (sometimes called an H&P, or History and Physical). This can be done at a clinic or pre-operative center.  ? If you're having a , you may not need this exam. Talk to your care team.    At your pre-op exam, talk to your care team about all medicines you take. If you need to stop any medicines before surgery, ask when to start taking them again.  ? We do this for your safety. Many medicines can make you bleed too much during surgery. Some change how well surgery (anesthesia) drugs work.    Call your insurance company to let them know you're having surgery. (If you don't have insurance, call [phone].)    Call your clinic if there's any change in your health. This includes signs of a cold or flu (sore throat, runny nose, cough, rash, fever). It also includes a scrape or scratch near the surgery site.    If you have questions on the day of surgery, call your hospital or surgery center.  COVID testing  You may need to be tested for COVID-19 before having  surgery. If so, your surgical team will give you instructions for scheduling this test, separate from your preoperative history and physical.  Eating and drinking guidelines  For your safety: Unless your surgeon tells you otherwise, follow the guidelines below.    Eat and drink as usual until 8 hours before surgery. After that, no food or milk.    Drink clear liquids until 2 hours before surgery. These are liquids you can see through, like water, Gatorade and Propel Water. You may also have black coffee and tea (no cream or milk).    Nothing by mouth within 2 hours of surgery. This includes gum, candy and breath mints.    If you drink alcohol: Stop drinking it the night before surgery.    If your care team tells you to take medicine on the morning of surgery, it's okay to take it with a sip of water.  Preventing infection    Shower or bathe the night before and morning of your surgery. Follow the instructions your clinic gave you. (If no instructions, use regular soap.)    Don't shave or clip hair near your surgery site. We'll remove the hair if needed.    Don't smoke or vape the morning of surgery. You may chew nicotine gum up to 2 hours before surgery. A nicotine patch is okay.  ? Note: Some surgeries require you to completely quit smoking and nicotine. Check with your surgeon.    Your care team will make every effort to keep you safe from infection. We will:  ? Clean our hands often with soap and water (or an alcohol-based hand rub).  ? Clean the skin at your surgery site with a special soap that kills germs.  ? Give you a special gown to keep you warm. (Cold raises the risk of infection.)  ? Wear special hair covers, masks, gowns and gloves during surgery.  ? Give antibiotic medicine, if prescribed. Not all surgeries need antibiotics.  What to bring on the day of surgery    Photo ID and insurance card    Copy of your health care directive, if you have one    Glasses and hearing aides (bring cases)  ? You can't  wear contacts during surgery    Inhaler and eye drops, if you use them (tell us about these when you arrive)    CPAP machine or breathing device, if you use them    A few personal items, if spending the night    If you have . . .  ? A pacemaker, ICD (cardiac defibrillator) or other implant: Bring the ID card.  ? An implanted stimulator: Bring the remote control.  ? A legal guardian: Bring a copy of the certified (court-stamped) guardianship papers.  Please remove any jewelry, including body piercings. Leave jewelry and other valuables at home.  If you're going home the day of surgery    You must have a responsible adult drive you home. They should stay with you overnight as well.    If you don't have someone to stay with you, and you aren't safe to go home alone, we may keep you overnight. Insurance often won't pay for this.  After surgery  If it's hard to control your pain or you need more pain medicine, please call your surgeon's office.  Questions?   If you have any questions for your care team, list them here: _________________________________________________________________________________________________________________________________________________________________________ ____________________________________ ____________________________________ ____________________________________  For informational purposes only. Not to replace the advice of your health care provider. Copyright   2003, 2019 [facility]. All rights reserved. Clinically reviewed by Faviola Luna MD. [employer] [id number] - REV 07/21.    How to Take Your Medication Before Surgery  - Take all of your medications before surgery except as noted below  - STOP taking all vitamins and herbal supplements 14 days before surgery.

## 2022-03-31 NOTE — PROGRESS NOTES
[facility]  [address] [facility] [address]  Phone: [phone]  Fax: [phone]  Primary Provider: Flex Bain        PREOPERATIVE EVALUATION:  Today's date: 3/31/2022    Maria D Moore is a 87 year old female who presents for a preoperative evaluation.    Surgical Information:  Surgery/Procedure: ESOPHAGOGASTRODUODENOSCOPY  Surgery Location: [facility] [place]  Surgeon: Dr. Acuna  Surgery Date: 4/4/22  Time of Surgery: 1:30 PM  Where patient plans to recover: At home alone  Fax number for surgical facility: Note does not need to be faxed, will be available electronically in Epic.    Type of Anesthesia Anticipated: General    Assessment & Plan     ICD-10-CM    1. Preop general physical exam  Z01.818    2. Encounter for screening for other viral diseases  Z11.59 Asymptomatic COVID-19 Virus (Coronavirus) by PCR Nose   3. Other specified abnormal findings of blood chemistry  R79.89 levothyroxine (SYNTHROID/LEVOTHROID) 25 MCG tablet     CBC with platelets and differential     Comprehensive metabolic panel (BMP + Alb, Alk Phos, ALT, AST, Total. Bili, TP)     CBC with platelets and differential     Comprehensive metabolic panel (BMP + Alb, Alk Phos, ALT, AST, Total. Bili, TP)   4. SVT (supraventricular tachycardia) (H)  I47.1 metoprolol succinate ER (TOPROL-XL) 50 MG 24 hr tablet   5. Hyperlipidemia, unspecified hyperlipidemia type  E78.5 simvastatin (ZOCOR) 20 MG tablet     TSH with free T4 reflex     TSH with free T4 reflex   6. Hypothyroidism, unspecified type  E03.9 Lipid panel     Lipid panel   7. Duodenal ulcer with perforation (H)  K26.5 pantoprazole (PROTONIX) 40 MG EC tablet   8. Senile osteoporosis  M81.0 Vitamin D Deficiency     Calcium     Medical decision making: Patient is here today for a preop exam.  She does not have any acute medical condition.  She is coming today fasting and would like her health maintenance labs done.  These include labs  for her hyperlipidemia, hypothyroidism and osteoporosis.  Labs ordered as above and we will follow up on them for any change of medication as needed.  Medication refill for her today for SVTs and PPI.    Patient does continue to take an aspirin 81 mg.  I reviewed her hospitalization during episode of GI bleed and it was told that there is no indication for aspirin.  This is discussed with her and I want to discontinue this from her medication list.  I discussed high risk of bleeding.  Note that patient was previously taking high-dose aspirin.    The proposed surgical procedure is considered LOW risk.      Risks and Recommendations:  The patient has the following additional risks and recommendations for perioperative complications:   - No identified additional risk factors other than previously addressed    Medication Instructions:  Patient is to take all scheduled medications on the day of surgery    RECOMMENDATION:  APPROVAL GIVEN to proceed with proposed procedure, without further diagnostic evaluation.        Subjective   Chief Complaint   Patient presents with     Pre-Op Exam       HPI related to upcoming procedure: Patient undergoing endoscopy for noted Veliz's esophagus.  She denies any acute concerns.  She has been taking her PPI.  She would like health maintenance labs done today.    Preop Questions 3/31/2022   1. Have you ever had a heart attack or stroke? No   2. Have you ever had surgery on your heart or blood vessels, such as a stent placement, a coronary artery bypass, or surgery on an artery in your head, neck, heart, or legs? No   3. Do you have chest pain with activity? No   4. Do you have a history of  heart failure? No   5. Do you currently have a cold, bronchitis or symptoms of other infection? No   6. Do you have a cough, shortness of breath, or wheezing? No   7. Do you or anyone in your family have previous history of blood clots? No   8. Do you or does anyone in your family have a serious  bleeding problem such as prolonged bleeding following surgeries or cuts? No   9. Have you ever had problems with anemia or been told to take iron pills? No   10. Have you had any abnormal blood loss such as black, tarry or bloody stools, or abnormal vaginal bleeding? No   11. Have you ever had a blood transfusion? YES -with last hospitalization due to perforated ulcer.   11a. Have you ever had a transfusion reaction? NO   12. Are you willing to have a blood transfusion if it is medically needed before, during, or after your surgery? Yes   13. Have you or any of your relatives ever had problems with anesthesia? No   14. Do you have sleep apnea, excessive snoring or daytime drowsiness? No   15. Do you have any artifical heart valves or other implanted medical devices like a pacemaker, defibrillator, or continuous glucose monitor? No   16. Do you have artificial joints? YES -Knee   17. Are you allergic to latex? No       Health Care Directive:  Patient does not have a Health Care Directive or Living Will: Patient states has Advance Directive and will bring in a copy to clinic.    Preoperative Review of :   reviewed - no record of controlled substances prescribed.      Status of Chronic Conditions:  HYPERLIPIDEMIA - Patient has a long history of significant Hyperlipidemia requiring medication for treatment with recent fair control. Patient reports no problems or side effects with the medication.     HYPOTHYROIDISM - Patient has a longstanding history of chronic Hypothyroidism. Patient has been doing well, noting no tremor, insomnia, hair loss or changes in skin texture. Continues to take medications as directed, without adverse reactions or side effects. Last TSH   Lab Results   Component Value Date    TSH 1.64 04/08/2021   .        Review of Systems  Constitutional, neuro, ENT, endocrine, pulmonary, cardiac, gastrointestinal, genitourinary, musculoskeletal, integument and psychiatric systems are negative, except  as otherwise noted.    Patient Active Problem List    Diagnosis Date Noted     Furuncle 09/22/2021     Priority: Medium     Formatting of this note might be different from the original.  Created by Conversion    Replacement Utility updated for latest IMO load       Lightheadedness 09/22/2021     Priority: Medium     Formatting of this note might be different from the original.  Created by Conversion       Perforated duodenal ulcer (H) 02/15/2021     Priority: Medium     Hypothyroidism 02/15/2021     Priority: Medium     Mixed hyperlipidemia      Priority: Medium     Created by Conversion         S/P exploratory laparotomy 02/12/2021     Priority: Medium     Anesthesia Type:  General  Clinton Pavon MD   Location: [facility] Main OR  Procedure Date:  2/9/2021   EXPLORATORY LAPAROTOMY, PLACEMENT OF GASTROSTOMY TUBE, REPAIR GRAHM PATCH   OF DUODENAL ULCER         Back Pain      Priority: Medium     Created by Conversion  Replacement Utility updated for latest IMO load         Free intraperitoneal air 02/09/2021     Priority: Medium     Added automatically from request for surgery [id number]         Duodenal ulcer with perforation (H) 02/09/2021     Priority: Medium     Anesthesia Type:  General  Clinton Pavon MD   Location: [facility] Main OR  Procedure Date:  2/9/2021  EXPLORATORY LAPAROTOMY, PLACEMENT OF GASTROSTOMY TUBE, REPAIR GRAHM PATCH   OF DUODENAL ULCER         Hiatal hernia      Priority: Medium     Actinic keratosis 01/21/2021     Priority: Medium     Hair thinning 01/21/2021     Priority: Medium     SVT (supraventricular tachycardia) (H) 05/31/2018     Priority: Medium     Generalized Osteoarthritis Of Multiple Sites      Priority: Medium     Created by Conversion         Hypotension, unspecified hypotension type      Priority: Medium     Abnormal Blood Chemistry      Priority: Medium     Created by Conversion  Replacement Utility updated for latest IMO load         Osteoarthritis Of The Ankle/Foot  (Multiple Joints)      Priority: Medium     Created by Conversion  Replacement Utility updated for latest IMO load         Furuncle      Priority: Medium     Created by Conversion  Replacement Utility updated for latest IMO load         Vaginal Discharge (Symptom)      Priority: Medium     Created by Conversion  Replacement Utility updated for latest IMO load         Osteoporosis Senile      Priority: Medium     Created by Conversion         Automatic Atrial Tachycardia      Priority: Medium     Created by Conversion         Lightheadedness      Priority: Medium     Created by Conversion         Fatigue      Priority: Medium     Created by Conversion         Temperature Intolerance To Cold   (Consistent)      Priority: Medium     Created by Conversion         Edema      Priority: Medium     Created by Conversion          Past Medical History:   Diagnosis Date     Back pain      Furuncle      Hyperlipidemia      Macular degeneration      Macular degeneration      Osteoarthritis      Osteoporosis      SVT (supraventricular tachycardia) (H)      SVT (supraventricular tachycardia) (H)      Past Surgical History:   Procedure Laterality Date     EP ABLATION SVT Right 6/26/2018    Procedure: EP Ablation Supra Ventricular;  Surgeon: Dany Renae MD;  Location: [facility] Cath Lab;  Service:      GASTROSTOMY, INSERT TUBE, COMBINED N/A 2/9/2021    Procedure: PLACEMENT OF GASTROSTOMY TUBE,;  Surgeon: Clinton Pavon MD;  Location: [facility];  Service: General     HC REMOVAL ADENOIDS,PRIMARY,<11 Y/O      Description: Adenoidectomy;  Recorded: 12/31/2007;     HC REMOVAL OF TONSILS,<11 Y/O      Description: Tonsillectomy;  Recorded: 12/31/2007;     HYSTERECTOMY       LAPAROTOMY EXPLORATORY N/A 2/9/2021    Procedure: EXPLORATORY LAPAROTOMY,  REPAIR GRAHM PATCH OF DUODENAL ULCER;  Surgeon: Clinton Pavon MD;  Location: [facility];  Service: General     OOPHORECTOMY       ZZC APPENDECTOMY       Description: Appendectomy;  Recorded: 12/31/2007;     [facility] TOTAL KNEE ARTHROPLASTY      Description: Total Knee Arthroplasty;  Recorded: 12/31/2007;     [facility] VAG HYST,RMV TUBE/OVARY,FIX ENTEROCE      Description: Vag Hysterect Uterus <250g Remov Both Ovaries Rep Enterocele;  Recorded: 12/31/2007;     Current Outpatient Medications   Medication Sig Dispense Refill     calcium carbonate (OS-DOMONIQUE) 600 mg (1,500 mg) tablet [CALCIUM CARBONATE (OS-DOMONIQUE) 600 MG (1,500 MG) TABLET] Take 600 mg by mouth every other day.              cholecalciferol, vitamin D3, 5,000 unit Tab [CHOLECALCIFEROL, VITAMIN D3, 5,000 UNIT TAB] Take by mouth. Take 1 tablet daily.       levothyroxine (SYNTHROID/LEVOTHROID) 25 MCG tablet [LEVOTHYROXINE (SYNTHROID, LEVOTHROID) 25 MCG TABLET] TAKE 1 TABLET DAILY AT 6:00 A.M. 90 tablet 3     metoprolol succinate ER (TOPROL-XL) 50 MG 24 hr tablet Take 1 tablet (50 mg) by mouth At Bedtime 90 tablet 3     niacin 500 MG tablet [NIACIN 500 MG TABLET] Take 500 mg by mouth daily with breakfast.       OMEGA-3/DHA/EPA/FISH OIL (FISH OIL-OMEGA-3 FATTY ACIDS) 300-1,000 mg capsule [OMEGA-3/DHA/EPA/FISH OIL (FISH OIL-OMEGA-3 FATTY ACIDS) 300-1,000 MG CAPSULE] Take 2,000 mg by mouth daily.        pantoprazole (PROTONIX) 40 MG EC tablet Take 1 tablet (40 mg) by mouth 2 times daily 180 tablet 3     simvastatin (ZOCOR) 20 MG tablet [SIMVASTATIN (ZOCOR) 20 MG TABLET] TAKE 1 TABLET DAILY AT BEDTIME 90 tablet 3     VIT C/E/ZN/COPPR/LUTEIN/ZEAXAN (PRESERVISION AREDS 2 ORAL) [VIT C/E/ZN/COPPR/LUTEIN/ZEAXAN (PRESERVISION AREDS 2 ORAL)] Take 1 tablet by mouth 2 (two) times a day.         Allergies   Allergen Reactions     Cephalexin Shortness Of Breath     Erythromycin Base [Erythromycin] Rash     Penicillins Unknown     Sulfa (Sulfonamide Antibiotics) [Sulfa Drugs] Rash     Vancomycin Itching        Social History     Tobacco Use     Smoking status: Never Smoker     Smokeless tobacco: Never Used   Substance Use Topics     Alcohol  "use: No     Family History   Problem Relation Age of Onset     Cerebrovascular Disease Father      History   Drug Use No         Objective     /64 (BP Location: Left arm, Patient Position: Sitting, Cuff Size: Adult Regular)   Pulse 63   Resp 16   Ht 1.549 m (5' 1\")   Wt 57.8 kg (127 lb 6.4 oz)   BMI 24.07 kg/m      Physical Exam  GENERAL APPEARANCE: healthy, alert and no distress  HENT: ear canals and TM's normal and nose and mouth without ulcers or lesions  RESP: lungs clear to auscultation - no rales, rhonchi or wheezes  CV: regular rate and rhythm, normal S1 S2, no S3 or S4 and no murmur, click or rub   ABDOMEN: soft, nontender, no HSM or masses and bowel sounds normal  NEURO: Normal strength and tone, sensory exam grossly normal, mentation intact and speech normal    Recent Labs   Lab Test 04/08/21  1036 04/08/21  1036 02/26/21  1509 02/15/21  0625 02/14/21  0620   HGB 12.9  --  11.7* 10.5* 11.0*     --  355 295 282   NA  --  143  --   --  139   POTASSIUM  --  4.4  --  3.8 3.6   CR  --  0.86  --   --  0.66        Diagnostics:  Recent Results (from the past 24 hour(s))   CBC with platelets and differential    Collection Time: 03/31/22  8:02 AM   Result Value Ref Range    WBC Count 7.1 4.0 - 11.0 10e3/uL    RBC Count 4.43 3.80 - 5.20 10e6/uL    Hemoglobin 13.6 11.7 - 15.7 g/dL    Hematocrit 40.5 35.0 - 47.0 %    MCV 91 78 - 100 fL    MCH 30.7 26.5 - 33.0 pg    MCHC 33.6 31.5 - 36.5 g/dL    RDW 13.2 10.0 - 15.0 %    Platelet Count 178 150 - 450 10e3/uL    % Neutrophils 61 %    % Lymphocytes 23 %    % Monocytes 8 %    % Eosinophils 7 %    % Basophils 1 %    % Immature Granulocytes 0 %    Absolute Neutrophils 4.3 1.6 - 8.3 10e3/uL    Absolute Lymphocytes 1.7 0.8 - 5.3 10e3/uL    Absolute Monocytes 0.6 0.0 - 1.3 10e3/uL    Absolute Eosinophils 0.5 0.0 - 0.7 10e3/uL    Absolute Basophils 0.1 0.0 - 0.2 10e3/uL    Absolute Immature Granulocytes 0.0 <=0.4 10e3/uL      No EKG required for low risk surgery " (cataract, skin procedure, breast biopsy, etc).    Revised Cardiac Risk Index (RCRI):  The patient has the following serious cardiovascular risks for perioperative complications:   - No serious cardiac risks = 0 points     RCRI Interpretation: 0 points: Class I (very low risk - 0.4% complication rate)           Signed Electronically by: Flex Bain MD  Copy of this evaluation report is provided to requesting physician.

## 2022-04-01 LAB — SARS-COV-2 RNA RESP QL NAA+PROBE: NEGATIVE

## 2022-04-04 ENCOUNTER — HOSPITAL ENCOUNTER (OUTPATIENT)
Facility: HOSPITAL | Age: 87
Discharge: HOME | End: 2022-04-04
Attending: INTERNAL MEDICINE | Admitting: INTERNAL MEDICINE
Payer: COMMERCIAL

## 2022-04-04 VITALS
OXYGEN SATURATION: 95 % | RESPIRATION RATE: 16 BRPM | HEART RATE: 69 BPM | TEMPERATURE: 98.6 F | DIASTOLIC BLOOD PRESSURE: 69 MMHG | SYSTOLIC BLOOD PRESSURE: 106 MMHG

## 2022-04-04 LAB — UPPER GI ENDOSCOPY: NORMAL

## 2022-04-04 PROCEDURE — 43239 EGD BIOPSY SINGLE/MULTIPLE: CPT | Performed by: INTERNAL MEDICINE

## 2022-04-04 PROCEDURE — 250N000011 HC RX IP 250 OP 636: Performed by: INTERNAL MEDICINE

## 2022-04-04 PROCEDURE — 999N000099 HC STATISTIC MODERATE SEDATION < 10 MIN: Performed by: INTERNAL MEDICINE

## 2022-04-04 PROCEDURE — 88305 TISSUE EXAM BY PATHOLOGIST: CPT | Mod: TC | Performed by: INTERNAL MEDICINE

## 2022-04-04 RX ORDER — NALOXONE HYDROCHLORIDE 0.4 MG/ML
0.4 INJECTION, SOLUTION INTRAMUSCULAR; INTRAVENOUS; SUBCUTANEOUS
Status: CANCELLED | OUTPATIENT
Start: 2022-04-04

## 2022-04-04 RX ORDER — LIDOCAINE 40 MG/G
CREAM TOPICAL
Status: DISCONTINUED | OUTPATIENT
Start: 2022-04-04 | End: 2022-04-04 | Stop reason: HOSPADM

## 2022-04-04 RX ORDER — NALOXONE HYDROCHLORIDE 0.4 MG/ML
0.2 INJECTION, SOLUTION INTRAMUSCULAR; INTRAVENOUS; SUBCUTANEOUS
Status: CANCELLED | OUTPATIENT
Start: 2022-04-04

## 2022-04-04 RX ORDER — PROCHLORPERAZINE MALEATE 5 MG
5 TABLET ORAL EVERY 6 HOURS PRN
Status: CANCELLED | OUTPATIENT
Start: 2022-04-04

## 2022-04-04 RX ORDER — FENTANYL CITRATE 50 UG/ML
INJECTION, SOLUTION INTRAMUSCULAR; INTRAVENOUS PRN
Status: COMPLETED | OUTPATIENT
Start: 2022-04-04 | End: 2022-04-04

## 2022-04-04 RX ORDER — ONDANSETRON 2 MG/ML
4 INJECTION INTRAMUSCULAR; INTRAVENOUS EVERY 6 HOURS PRN
Status: CANCELLED | OUTPATIENT
Start: 2022-04-04

## 2022-04-04 RX ORDER — ONDANSETRON 4 MG/1
4 TABLET, ORALLY DISINTEGRATING ORAL EVERY 6 HOURS PRN
Status: CANCELLED | OUTPATIENT
Start: 2022-04-04

## 2022-04-04 RX ORDER — FLUMAZENIL 0.1 MG/ML
0.2 INJECTION, SOLUTION INTRAVENOUS
Status: CANCELLED | OUTPATIENT
Start: 2022-04-04 | End: 2022-04-05

## 2022-04-04 RX ORDER — ONDANSETRON 2 MG/ML
4 INJECTION INTRAMUSCULAR; INTRAVENOUS
Status: DISCONTINUED | OUTPATIENT
Start: 2022-04-04 | End: 2022-04-04 | Stop reason: HOSPADM

## 2022-04-04 RX ADMIN — MIDAZOLAM 1 MG: 1 INJECTION INTRAMUSCULAR; INTRAVENOUS at 13:16

## 2022-04-04 RX ADMIN — FENTANYL CITRATE 50 MCG: 50 INJECTION, SOLUTION INTRAMUSCULAR; INTRAVENOUS at 13:16

## 2022-04-04 NOTE — H&P
Full Pre-Op H&P from Dr. Bain 3/31/22 reviewed. No changes.  Plan for EGD for f/u Veliz's; noted history of duodenal ulcer.

## 2022-04-06 LAB
PATH REPORT.COMMENTS IMP SPEC: NORMAL
PATH REPORT.COMMENTS IMP SPEC: NORMAL
PATH REPORT.FINAL DX SPEC: NORMAL
PATH REPORT.GROSS SPEC: NORMAL
PATH REPORT.MICROSCOPIC SPEC OTHER STN: NORMAL
PATH REPORT.RELEVANT HX SPEC: NORMAL
PHOTO IMAGE: NORMAL

## 2022-04-06 PROCEDURE — 88305 TISSUE EXAM BY PATHOLOGIST: CPT | Mod: 26 | Performed by: PATHOLOGY

## 2022-04-07 ENCOUNTER — TELEPHONE (OUTPATIENT)
Dept: FAMILY MEDICINE | Facility: CLINIC | Age: 87
End: 2022-04-07

## 2022-04-07 ENCOUNTER — OFFICE VISIT (OUTPATIENT)
Dept: CARDIOLOGY | Facility: CLINIC | Age: 87
End: 2022-04-07
Payer: COMMERCIAL

## 2022-04-07 VITALS
SYSTOLIC BLOOD PRESSURE: 110 MMHG | DIASTOLIC BLOOD PRESSURE: 64 MMHG | HEART RATE: 68 BPM | WEIGHT: 127 LBS | RESPIRATION RATE: 16 BRPM | BODY MASS INDEX: 24 KG/M2

## 2022-04-07 DIAGNOSIS — I34.0 NONRHEUMATIC MITRAL VALVE REGURGITATION: Primary | ICD-10-CM

## 2022-04-07 DIAGNOSIS — I34.0 MITRAL VALVE INSUFFICIENCY, UNSPECIFIED ETIOLOGY: ICD-10-CM

## 2022-04-07 PROCEDURE — 99214 OFFICE O/P EST MOD 30 MIN: CPT | Performed by: INTERNAL MEDICINE

## 2022-04-07 NOTE — LETTER
4/7/2022    Flex Bain MD  [address]  [facility] [address]    RE: Maria D Moore       Dear Colleague,     I had the pleasure of seeing Maria D Moore in the [facility].         [facility] [address], [place] [address]   [email or web address]   OFFICE: [phone]          Thank you Flex Lott for asking the [facility] team to participate in the care of your patient, Maria D Moore.     Impression and Plan     1. Supraventricular tachycardia. Maria D underwent successful ablation of slow AV node pathway with cryoablation 26 June 2018.  This denies any subjective recurrence of SVT since my last visit with her.  Overall, she is very pleased with how she is performing from a cardiac standpoint.     2.  Mitral insufficiency.  This was felt moderate in degree on last echocardiogram on 1 June 2018.  Significant progression is not suspected on clinical exam or subjectively.    Plan to obtain echocardiogram since it has been approximately 4 years since her last assessment.    Plan on follow-up in one year.    30 minutes spent reviewing prior records (including documentation, laboratory studies, cardiac testing/imaging), interview with patient along with physical exam, planning, and subsequent documentation/crafting of note).           History of Present Illness    Once again I would like to thank you again for asking me to participate in the care of your patient, Maria D Moore.  As you know, but to reiterate for my own records, Maria D Moore is a 87 year old female with history of SVT.  Patient underwent successful ablation of slow AV node pathway with cryoablation 26 June 2018.     In follow-up today, Maria D denies any recurrent subjective SVT since my last visit with her.  She denies any chest pain or shortness of breath. She denies any subjective decline in exercise tolerance.   No fevers, chills, or other constitutional symptoms.    Further review of systems is otherwise negative/noncontributory (medical record and 13 point review of systems reviewed as well and pertinent positives noted).         Cardiac Diagnostics      Echocardiogram 1 June 2018 (personally reviewed):  1. Normal left ventricular size and systolic performance with a visually estimated ejection fraction of 65%.   2. There is moderate mitral insufficiency.   3. There is moderate tricuspid insufficiency.   4. Normal right ventricular size and systolic performance.   5. There is moderate biatrial enlargement.    When compared to the prior real-time echocardiogram dated 14 August 2010, the degree of mitral insufficiency has increased from mild to now moderate.    Echocardiogram 14 August 2010:  1. Normal left ventricular size and systolic performance with ejection fraction of 55%.  2. Moderate tricuspid insufficiency.  3. Mild left atrial enlargement.    Nuclear perfusion imaging study 1 August 2018:  1. Small reversible defect in the anterior/apical segments.  Significant splanchnic artifact reducing sensitivity of finding.  2. Normal left ventricular systolic performance with ejection fraction of 81%.    Event recorder 6 April 2016 through 2 May 2016:  1. Largely normal 30-day event monitor.  2. Symptoms correlating with sinus rhythm and occasional atrial ectopy.    Holter monitor 2 March 2015:  1. Moderate amount of atrial ectopy is present but most of the PACs are singular or short runs at modest rates.   No atrial fibrillation identified.         Physical Examination       /64 (BP Location: Right arm, Patient Position: Sitting, Cuff Size: Adult Regular)   Pulse 68   Resp 16   Wt 57.6 kg (127 lb)   BMI 24.00 kg/m          Wt Readings from Last 3 Encounters:   04/07/22 57.6 kg (127 lb)   03/31/22 57.8 kg (127 lb 6.4 oz)   02/16/22 56 kg (123 lb 6.4 oz)       The patient is alert and oriented times three. Sclerae  are anicteric. Mucosal membranes are moist. Jugular venous pressure is normal. No significant adenopathy/thyromegally appreciated. Lungs are clear with good expansion. On cardiovascular exam, the patient has a regular S1 and S2. Abdomen is soft and non-tender. Extremities reveal no clubbing, cyanosis, or edema.         Imaging             Medications  Allergies   Current Outpatient Medications   Medication Sig Dispense Refill     calcium carbonate (OS-DOMONIQUE) 600 mg (1,500 mg) tablet [CALCIUM CARBONATE (OS-DOMONIQUE) 600 MG (1,500 MG) TABLET] Take 600 mg by mouth every other day.              cholecalciferol, vitamin D3, 5,000 unit Tab Take 125 mcg by mouth daily        levothyroxine (SYNTHROID/LEVOTHROID) 25 MCG tablet [LEVOTHYROXINE (SYNTHROID, LEVOTHROID) 25 MCG TABLET] TAKE 1 TABLET DAILY AT 6:00 A.M. 90 tablet 3     metoprolol succinate ER (TOPROL-XL) 50 MG 24 hr tablet Take 1 tablet (50 mg) by mouth At Bedtime 90 tablet 3     niacin 500 MG tablet [NIACIN 500 MG TABLET] Take 500 mg by mouth daily with breakfast.       OMEGA-3/DHA/EPA/FISH OIL (FISH OIL-OMEGA-3 FATTY ACIDS) 300-1,000 mg capsule [OMEGA-3/DHA/EPA/FISH OIL (FISH OIL-OMEGA-3 FATTY ACIDS) 300-1,000 MG CAPSULE] Take 2,000 mg by mouth daily.        pantoprazole (PROTONIX) 40 MG EC tablet Take 1 tablet (40 mg) by mouth 2 times daily (Patient taking differently: Take 40 mg by mouth daily ) 180 tablet 3     simvastatin (ZOCOR) 20 MG tablet [SIMVASTATIN (ZOCOR) 20 MG TABLET] TAKE 1 TABLET DAILY AT BEDTIME 90 tablet 3     VIT C/E/ZN/COPPR/LUTEIN/ZEAXAN (PRESERVISION AREDS 2 ORAL) [VIT C/E/ZN/COPPR/LUTEIN/ZEAXAN (PRESERVISION AREDS 2 ORAL)] Take 1 tablet by mouth 2 (two) times a day.         Allergies   Allergen Reactions     Cephalexin Shortness Of Breath     Erythromycin Base [Erythromycin] Rash     Penicillins Unknown     Sulfa (Sulfonamide Antibiotics) [Sulfa Drugs] Rash     Vancomycin Itching          Lab Results    Chemistry/lipid CBC Cardiac  Enzymes/BNP/TSH/INR   Recent Labs   Lab Test 03/31/22  0802   CHOL 187   HDL 83   LDL 89   TRIG 75     Recent Labs   Lab Test 03/31/22  0802 04/08/21  1036 09/11/19  0733   LDL 89 99 80     Recent Labs   Lab Test 03/31/22  0802      POTASSIUM 4.4   CHLORIDE 106   CO2 24   GLC 83   BUN 14   CR 0.91   GFRESTIMATED 61   DOMONIQUE 10.4  10.4     Recent Labs   Lab Test 03/31/22  0802 04/08/21  1036 02/14/21  0620   CR 0.91 0.86 0.66     No results for input(s): A1C in the last 03388 hours.       Recent Labs   Lab Test 03/31/22  0802   WBC 7.1   HGB 13.6   HCT 40.5   MCV 91        Recent Labs   Lab Test 03/31/22  0802 04/08/21  1036 02/26/21  1509   HGB 13.6 12.9 11.7*    Recent Labs   Lab Test 06/01/18  0706 05/31/18  2305 05/31/18  1727   TROPONINI 0.31* 0.41* 0.28     Recent Labs   Lab Test 04/08/21  1036 02/10/21  0438   * 518*     Recent Labs   Lab Test 03/31/22  0802   TSH 2.62     Recent Labs   Lab Test 06/01/18  0011 05/31/18  1225   INR 1.16* 1.12*        Medical History  Surgical History Family History Social History   Past Medical History:   Diagnosis Date     Back pain      Furuncle      Hyperlipidemia      Macular degeneration      Macular degeneration      Osteoarthritis      Osteoporosis      SVT (supraventricular tachycardia) (H)      SVT (supraventricular tachycardia) (H)      Past Surgical History:   Procedure Laterality Date     EP ABLATION SVT Right 6/26/2018    Procedure: EP Ablation Supra Ventricular;  Surgeon: Dany Renae MD;  Location: [facility];  Service:      ESOPHAGOSCOPY, GASTROSCOPY, DUODENOSCOPY (EGD), COMBINED N/A 4/4/2022    Procedure: ESOPHAGOGASTRODUODENOSCOPY with esophageal biopsy;  Surgeon: Ashlyn Acuna MD;  Location: [facility]     GASTROSTOMY, INSERT TUBE, COMBINED N/A 2/9/2021    Procedure: PLACEMENT OF GASTROSTOMY TUBE,;  Surgeon: Clinton Pavon MD;  Location: [facility] OR;  Service: General     HC REMOVAL ADENOIDS,PRIMARY,<11 Y/O       Description: Adenoidectomy;  Recorded: 12/31/2007;     HC REMOVAL OF TONSILS,<11 Y/O      Description: Tonsillectomy;  Recorded: 12/31/2007;     HYSTERECTOMY       LAPAROTOMY EXPLORATORY N/A 2/9/2021    Procedure: EXPLORATORY LAPAROTOMY,  REPAIR GRAHM PATCH OF DUODENAL ULCER;  Surgeon: Clinton Pavon MD;  Location: [facility];  Service: General     OOPHORECTOMY       Z APPENDECTOMY      Description: Appendectomy;  Recorded: 12/31/2007;     ZC TOTAL KNEE ARTHROPLASTY      Description: Total Knee Arthroplasty;  Recorded: 12/31/2007;     C VAG HYST,RMV TUBE/OVARY,FIX ENTEROCE      Description: Vag Hysterect Uterus <250g Remov Both Ovaries Rep Enterocele;  Recorded: 12/31/2007;     Family History   Problem Relation Age of Onset     Cerebrovascular Disease Father         Social History     Socioeconomic History     Marital status:      Spouse name: Not on file     Number of children: Not on file     Years of education: Not on file     Highest education level: Not on file   Occupational History     Not on file   Tobacco Use     Smoking status: Never Smoker     Smokeless tobacco: Never Used   Substance and Sexual Activity     Alcohol use: No     Drug use: No     Sexual activity: Not on file   Other Topics Concern     Parent/sibling w/ CABG, MI or angioplasty before 65F 55M? 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           Social Determinants of Health     Financial Resource Strain: Not on file   Food Insecurity: Not on file   Transportation Needs: Not on file   Physical Activity: Not on file   Stress: Not on file   Social Connections: Not on file   Intimate Partner Violence: Not on file   Housing Stability: Not on file                      Thank you for allowing me to participate in the care of your patient.      Sincerely,     Radha Contreras MD     [facility]  cc:   Referred  Self

## 2022-04-07 NOTE — PROGRESS NOTES
[facility] [address], [place] [address]   [email or web address]   OFFICE: [phone]          Thank you Flex Lott for asking the [facility] team to participate in the care of your patient, Maria D Moore.     Impression and Plan     1. Supraventricular tachycardia. Maria D underwent successful ablation of slow AV node pathway with cryoablation 26 June 2018.  This denies any subjective recurrence of SVT since my last visit with her.  Overall, she is very pleased with how she is performing from a cardiac standpoint.     2.  Mitral insufficiency.  This was felt moderate in degree on last echocardiogram on 1 June 2018.  Significant progression is not suspected on clinical exam or subjectively.    Plan to obtain echocardiogram since it has been approximately 4 years since her last assessment.    Plan on follow-up in one year.    30 minutes spent reviewing prior records (including documentation, laboratory studies, cardiac testing/imaging), interview with patient along with physical exam, planning, and subsequent documentation/crafting of note).           History of Present Illness    Once again I would like to thank you again for asking me to participate in the care of your patient, Maria D Moore.  As you know, but to reiterate for my own records, Maria D Moore is a 87 year old female with history of SVT.  Patient underwent successful ablation of slow AV node pathway with cryoablation 26 June 2018.     In follow-up today, Maria D denies any recurrent subjective SVT since my last visit with her.  She denies any chest pain or shortness of breath. She denies any subjective decline in exercise tolerance.  No fevers, chills, or other constitutional symptoms.    Further review of systems is otherwise negative/noncontributory (medical record and 13 point review of systems reviewed as well and pertinent positives noted).          Cardiac Diagnostics      Echocardiogram 1 June 2018 (personally reviewed):  1. Normal left ventricular size and systolic performance with a visually estimated ejection fraction of 65%.   2. There is moderate mitral insufficiency.   3. There is moderate tricuspid insufficiency.   4. Normal right ventricular size and systolic performance.   5. There is moderate biatrial enlargement.    When compared to the prior real-time echocardiogram dated 14 August 2010, the degree of mitral insufficiency has increased from mild to now moderate.    Echocardiogram 14 August 2010:  1. Normal left ventricular size and systolic performance with ejection fraction of 55%.  2. Moderate tricuspid insufficiency.  3. Mild left atrial enlargement.    Nuclear perfusion imaging study 1 August 2018:  1. Small reversible defect in the anterior/apical segments.  Significant splanchnic artifact reducing sensitivity of finding.  2. Normal left ventricular systolic performance with ejection fraction of 81%.    Event recorder 6 April 2016 through 2 May 2016:  1. Largely normal 30-day event monitor.  2. Symptoms correlating with sinus rhythm and occasional atrial ectopy.    Holter monitor 2 March 2015:  1. Moderate amount of atrial ectopy is present but most of the PACs are singular or short runs at modest rates.   No atrial fibrillation identified.         Physical Examination       /64 (BP Location: Right arm, Patient Position: Sitting, Cuff Size: Adult Regular)   Pulse 68   Resp 16   Wt 57.6 kg (127 lb)   BMI 24.00 kg/m          Wt Readings from Last 3 Encounters:   04/07/22 57.6 kg (127 lb)   03/31/22 57.8 kg (127 lb 6.4 oz)   02/16/22 56 kg (123 lb 6.4 oz)       The patient is alert and oriented times three. Sclerae are anicteric. Mucosal membranes are moist. Jugular venous pressure is normal. No significant adenopathy/thyromegally appreciated. Lungs are clear with good expansion. On cardiovascular exam, the patient has a regular  S1 and S2. Abdomen is soft and non-tender. Extremities reveal no clubbing, cyanosis, or edema.         Imaging             Medications  Allergies   Current Outpatient Medications   Medication Sig Dispense Refill     calcium carbonate (OS-DOMONIQUE) 600 mg (1,500 mg) tablet [CALCIUM CARBONATE (OS-DOMONIQUE) 600 MG (1,500 MG) TABLET] Take 600 mg by mouth every other day.              cholecalciferol, vitamin D3, 5,000 unit Tab Take 125 mcg by mouth daily        levothyroxine (SYNTHROID/LEVOTHROID) 25 MCG tablet [LEVOTHYROXINE (SYNTHROID, LEVOTHROID) 25 MCG TABLET] TAKE 1 TABLET DAILY AT 6:00 A.M. 90 tablet 3     metoprolol succinate ER (TOPROL-XL) 50 MG 24 hr tablet Take 1 tablet (50 mg) by mouth At Bedtime 90 tablet 3     niacin 500 MG tablet [NIACIN 500 MG TABLET] Take 500 mg by mouth daily with breakfast.       OMEGA-3/DHA/EPA/FISH OIL (FISH OIL-OMEGA-3 FATTY ACIDS) 300-1,000 mg capsule [OMEGA-3/DHA/EPA/FISH OIL (FISH OIL-OMEGA-3 FATTY ACIDS) 300-1,000 MG CAPSULE] Take 2,000 mg by mouth daily.        pantoprazole (PROTONIX) 40 MG EC tablet Take 1 tablet (40 mg) by mouth 2 times daily (Patient taking differently: Take 40 mg by mouth daily ) 180 tablet 3     simvastatin (ZOCOR) 20 MG tablet [SIMVASTATIN (ZOCOR) 20 MG TABLET] TAKE 1 TABLET DAILY AT BEDTIME 90 tablet 3     VIT C/E/ZN/COPPR/LUTEIN/ZEAXAN (PRESERVISION AREDS 2 ORAL) [VIT C/E/ZN/COPPR/LUTEIN/ZEAXAN (PRESERVISION AREDS 2 ORAL)] Take 1 tablet by mouth 2 (two) times a day.         Allergies   Allergen Reactions     Cephalexin Shortness Of Breath     Erythromycin Base [Erythromycin] Rash     Penicillins Unknown     Sulfa (Sulfonamide Antibiotics) [Sulfa Drugs] Rash     Vancomycin Itching          Lab Results    Chemistry/lipid CBC Cardiac Enzymes/BNP/TSH/INR   Recent Labs   Lab Test 03/31/22  0802   CHOL 187   HDL 83   LDL 89   TRIG 75     Recent Labs   Lab Test 03/31/22  0802 04/08/21  1036 09/11/19  0733   LDL 89 99 80     Recent Labs   Lab Test 03/31/22  0802       POTASSIUM 4.4   CHLORIDE 106   CO2 24   GLC 83   BUN 14   CR 0.91   GFRESTIMATED 61   DOMONIQUE 10.4  10.4     Recent Labs   Lab Test 03/31/22  0802 04/08/21  1036 02/14/21  0620   CR 0.91 0.86 0.66     No results for input(s): A1C in the last 29337 hours.       Recent Labs   Lab Test 03/31/22  0802   WBC 7.1   HGB 13.6   HCT 40.5   MCV 91        Recent Labs   Lab Test 03/31/22  0802 04/08/21  1036 02/26/21  1509   HGB 13.6 12.9 11.7*    Recent Labs   Lab Test 06/01/18  0706 05/31/18  2305 05/31/18  1727   TROPONINI 0.31* 0.41* 0.28     Recent Labs   Lab Test 04/08/21  1036 02/10/21  0438   * 518*     Recent Labs   Lab Test 03/31/22  0802   TSH 2.62     Recent Labs   Lab Test 06/01/18  0011 05/31/18  1225   INR 1.16* 1.12*        Medical History  Surgical History Family History Social History   Past Medical History:   Diagnosis Date     Back pain      Furuncle      Hyperlipidemia      Macular degeneration      Macular degeneration      Osteoarthritis      Osteoporosis      SVT (supraventricular tachycardia) (H)      SVT (supraventricular tachycardia) (H)      Past Surgical History:   Procedure Laterality Date     EP ABLATION SVT Right 6/26/2018    Procedure: EP Ablation Supra Ventricular;  Surgeon: Dany Renae MD;  Location: [facility];  Service:      ESOPHAGOSCOPY, GASTROSCOPY, DUODENOSCOPY (EGD), COMBINED N/A 4/4/2022    Procedure: ESOPHAGOGASTRODUODENOSCOPY with esophageal biopsy;  Surgeon: Ashlyn Acuna MD;  Location: [facility]     GASTROSTOMY, INSERT TUBE, COMBINED N/A 2/9/2021    Procedure: PLACEMENT OF GASTROSTOMY TUBE,;  Surgeon: Clinton Pavon MD;  Location: [facility] OR;  Service: General     HC REMOVAL ADENOIDS,PRIMARY,<13 Y/O      Description: Adenoidectomy;  Recorded: 12/31/2007;     HC REMOVAL OF TONSILS,<13 Y/O      Description: Tonsillectomy;  Recorded: 12/31/2007;     HYSTERECTOMY       LAPAROTOMY EXPLORATORY N/A 2/9/2021    Procedure: EXPLORATORY LAPAROTOMY,   REPAIR GRAHM PATCH OF DUODENAL ULCER;  Surgeon: Clinton Pavon MD;  Location: [facility] OR;  Service: General     OOPHORECTOMY       ZC APPENDECTOMY      Description: Appendectomy;  Recorded: 12/31/2007;     ZC TOTAL KNEE ARTHROPLASTY      Description: Total Knee Arthroplasty;  Recorded: 12/31/2007;     RUST VAG HYST,RMV TUBE/OVARY,FIX ENTEROCE      Description: Vag Hysterect Uterus <250g Remov Both Ovaries Rep Enterocele;  Recorded: 12/31/2007;     Family History   Problem Relation Age of Onset     Cerebrovascular Disease Father         Social History     Socioeconomic History     Marital status:      Spouse name: Not on file     Number of children: Not on file     Years of education: Not on file     Highest education level: Not on file   Occupational History     Not on file   Tobacco Use     Smoking status: Never Smoker     Smokeless tobacco: Never Used   Substance and Sexual Activity     Alcohol use: No     Drug use: No     Sexual activity: Not on file   Other Topics Concern     Parent/sibling w/ CABG, MI or angioplasty before 65F 55M? 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           Social Determinants of Health     Financial Resource Strain: Not on file   Food Insecurity: Not on file   Transportation Needs: Not on file   Physical Activity: Not on file   Stress: Not on file   Social Connections: Not on file   Intimate Partner Violence: Not on file   Housing Stability: Not on file

## 2022-04-07 NOTE — TELEPHONE ENCOUNTER
Incoming call from patient wanting to check with Dr. ENCARNACION if she's a candidate for the second booster. Pt states she thinks she is but she wants to make sure and check with provider first before scheduling. Pt would like a call back to let her know. Ok to leave detailed message.

## 2022-04-11 ENCOUNTER — NURSE TRIAGE (OUTPATIENT)
Dept: NURSING | Facility: CLINIC | Age: 87
End: 2022-04-11
Payer: COMMERCIAL

## 2022-04-11 NOTE — TELEPHONE ENCOUNTER
RN Triage:    Spoke with 87 yr old Maria D who is requesting information related to surgery of 2/9/21 for perforated ulcer.    Pt is asking if hernia was repaired at the same time?    Pt reports Dr. Pavon with general surgery told her the hernia was repaired.    Pt concerned because latest endoscopy indicates a large hernia.    Surgical notes from 2/9/21 indicate repair of duodenal ulcer only.    PLAN:  Advised patient speak with surgery clinic in [place] regarding her concerns/questions regarding surgery that was performed February 9th 2021.  Phone call to patient to relay surgery clinic phone number [phone]    Nivia Fields RN  [place] [facility]      Additional Information    Negative: Nursing judgment    Negative: Nursing judgment    Negative: Nursing judgment    Negative: Nursing judgment    Information only question and nurse able to answer     Specialist's office phone number.    Protocols used: INFORMATION ONLY CALL - NO TRIAGE-A-OH

## 2022-04-12 ENCOUNTER — IMMUNIZATION (OUTPATIENT)
Dept: NURSING | Facility: CLINIC | Age: 87
End: 2022-04-12
Payer: COMMERCIAL

## 2022-04-12 PROCEDURE — 91305 COVID-19,PF,PFIZER (12+ YRS): CPT

## 2022-04-12 PROCEDURE — 0054A COVID-19,PF,PFIZER (12+ YRS): CPT

## 2022-04-18 ENCOUNTER — VIRTUAL VISIT (OUTPATIENT)
Dept: SURGERY | Facility: CLINIC | Age: 87
End: 2022-04-18
Payer: COMMERCIAL

## 2022-04-18 DIAGNOSIS — K44.9 HIATAL HERNIA: ICD-10-CM

## 2022-04-18 DIAGNOSIS — K26.5 PERFORATED DUODENAL ULCER (H): Primary | ICD-10-CM

## 2022-04-18 PROCEDURE — 99442 PR PHYSICIAN TELEPHONE EVALUATION 11-20 MIN: CPT | Performed by: SPECIALIST

## 2022-04-18 NOTE — LETTER
"    4/18/2022         RE: Maria D Moore  [address]  [facility] [address]        Dear Colleague,    Thank you for referring your patient, Maria D Moore, to the [facility] SURGERY [facility]IATRICS [facility] [place]. Please see a copy of my visit note below.      The patient has been notified of following:     \"This telephone visit will be conducted via a call between you and your physician/provider. We have found that certain health care needs can be provided without the need for a physical exam.  This service lets us provide the care you need with a short phone conversation.  If a prescription is necessary we can send it directly to your pharmacy.  If lab work is needed we can place an order for that and you can then stop by our lab to have the test done at a later time.    Telephone visits are billed at different rates depending on your insurance coverage. During this emergency period, for some insurers they may be billed the same as an in-person visit.  Please reach out to your insurance provider with any questions.    If during the course of the call the physician/provider feels a telephone visit is not appropriate, you will not be charged for this service.\"    Patient has given verbal consent to a Telephone visit? Yes    What phone number would you like to be contacted at? [phone]    Patient would like to receive their AVS by Enoch    Additional provider notes:     [facility] Surgery Follow up    HPI:    87 year old year old female who returns for a follow up.  She wants to have a phone visit today to talk about her prior surgery the events involved and where she should go in the future with this issue.  She underwent emergent exploration secondary perforated duodenal ulcer which at the same time I pexied her stomach secondary to large hiatal hernia this is a few years ago    Allergies:Cephalexin, Erythromycin base [erythromycin], Penicillins, Sulfa (sulfonamide " antibiotics) [sulfa drugs], and Vancomycin    Past Medical History:   Diagnosis Date     Back pain      Furuncle      Hyperlipidemia      Macular degeneration      Macular degeneration      Osteoarthritis      Osteoporosis      SVT (supraventricular tachycardia) (H)      SVT (supraventricular tachycardia) (H)        Past Surgical History:   Procedure Laterality Date     EP ABLATION SVT Right 6/26/2018    Procedure: EP Ablation Supra Ventricular;  Surgeon: Dany Renae MD;  Location: [facility];  Service:      ESOPHAGOSCOPY, GASTROSCOPY, DUODENOSCOPY (EGD), COMBINED N/A 4/4/2022    Procedure: ESOPHAGOGASTRODUODENOSCOPY with esophageal biopsy;  Surgeon: Ashlyn Acuna MD;  Location: [facility] GI     GASTROSTOMY, INSERT TUBE, COMBINED N/A 2/9/2021    Procedure: PLACEMENT OF GASTROSTOMY TUBE,;  Surgeon: Clinton Pavon MD;  Location: [facility];  Service: General     HC REMOVAL ADENOIDS,PRIMARY,<11 Y/O      Description: Adenoidectomy;  Recorded: 12/31/2007;     HC REMOVAL OF TONSILS,<11 Y/O      Description: Tonsillectomy;  Recorded: 12/31/2007;     HYSTERECTOMY       LAPAROTOMY EXPLORATORY N/A 2/9/2021    Procedure: EXPLORATORY LAPAROTOMY,  REPAIR GRAHM PATCH OF DUODENAL ULCER;  Surgeon: Clinton Pavon MD;  Location: [facility];  Service: General     OOPHORECTOMY       [facility] APPENDECTOMY      Description: Appendectomy;  Recorded: 12/31/2007;     [facility] TOTAL KNEE ARTHROPLASTY      Description: Total Knee Arthroplasty;  Recorded: 12/31/2007;     [facility] VAG HYST,RMV TUBE/OVARY,FIX ENTEROCE      Description: Vag Hysterect Uterus <250g Remov Both Ovaries Rep Enterocele;  Recorded: 12/31/2007;       CURRENT MEDS:  Current Outpatient Medications   Medication     calcium carbonate (OS-DOMONIQUE) 600 mg (1,500 mg) tablet     cholecalciferol, vitamin D3, 5,000 unit Tab     levothyroxine (SYNTHROID/LEVOTHROID) 25 MCG tablet     metoprolol succinate ER (TOPROL-XL) 50 MG 24 hr tablet     niacin 500 MG tablet      OMEGA-3/DHA/EPA/FISH OIL (FISH OIL-OMEGA-3 FATTY ACIDS) 300-1,000 mg capsule     pantoprazole (PROTONIX) 40 MG EC tablet     simvastatin (ZOCOR) 20 MG tablet     VIT C/E/ZN/COPPR/LUTEIN/ZEAXAN (PRESERVISION AREDS 2 ORAL)     Current Facility-Administered Medications   Medication     denosumab (PROLIA) injection 60 mg       Family History   Problem Relation Age of Onset     Cerebrovascular Disease Father         reports that she has never smoked. She has never used smokeless tobacco. She reports that she does not drink alcohol and does not use drugs.    Review of Systems:  Negative except history of severe reflux hiatal hernia but she has not had any troubles or problems since I fixed her duodenal perforation and pexied her stomach.  He is unsure of whether I actually repaired rhinal hernia or not.  This price upon the biggest part of her questioning today.Otherwise twelve system of review is negative.      OBJECTIVE:  There were no vitals filed for this visit.  There is no height or weight on file to calculate BMI.    Status: doing well    EXAM:  No exam was done today this is a telephone visit        LABS:  Lab Results   Component Value Date    WBC 7.1 03/31/2022    HGB 13.6 03/31/2022    HCT 40.5 03/31/2022    MCV 91 03/31/2022     03/31/2022       Lab Results   Component Value Date    ALT 12 03/31/2022    AST 22 03/31/2022    ALKPHOS 70 03/31/2022        Images: Reviewed her prior CT scan of her abdomen prior to her exploratory laparotomy    Assessment/Plan:   Status post explore lap repair of duodenal perforation pexied of stomach secondary to large hiatal hernia.  Downturning is probably because of her issues both her ulcer and her reflux she now does not have any problems with reflux or other issues at all she had a recent EGD by GI doc.  She stated the GI doc told her she does need any more EKG EGDs that she is too old to have any intervention.  I think this kind of bothered her in she calls me to  find out about what she had done in she should have yearly surveillance and what other chronic issues she should do as far as medications her reflux since she has no reflux after surgery.  I told her to stick on her reflux meds I think it certainly good thing to do I think she can get a yearly EGD if she wants the reason to be done to make sure she does not have developing Veliz's.  I think that if she has trouble with that Keerthi does not want to do that she could arrange through our clinic our doctors here do do surveillance EGDs.  We spent a lot of time to 20 minutes just discussing things answering questions at the end she feels comfortable with how things are going and happy with her result so far.    No follow-ups on file.     Clinton Pavon MD ,MD  [facility] Department of Surgery    Phone call duration: 20 minutes      Again, thank you for allowing me to participate in the care of your patient.        Sincerely,        Clinton Pavon MD

## 2022-04-18 NOTE — PROGRESS NOTES
"  The patient has been notified of following:     \"This telephone visit will be conducted via a call between you and your physician/provider. We have found that certain health care needs can be provided without the need for a physical exam.  This service lets us provide the care you need with a short phone conversation.  If a prescription is necessary we can send it directly to your pharmacy.  If lab work is needed we can place an order for that and you can then stop by our lab to have the test done at a later time.    Telephone visits are billed at different rates depending on your insurance coverage. During this emergency period, for some insurers they may be billed the same as an in-person visit.  Please reach out to your insurance provider with any questions.    If during the course of the call the physician/provider feels a telephone visit is not appropriate, you will not be charged for this service.\"    Patient has given verbal consent to a Telephone visit? Yes    What phone number would you like to be contacted at? [phone]    Patient would like to receive their AVS by Enoch    Additional provider notes:     [facility] Surgery Follow up    HPI:    87 year old year old female who returns for a follow up.  She wants to have a phone visit today to talk about her prior surgery the events involved and where she should go in the future with this issue.  She underwent emergent exploration secondary perforated duodenal ulcer which at the same time I pexied her stomach secondary to large hiatal hernia this is a few years ago    Allergies:Cephalexin, Erythromycin base [erythromycin], Penicillins, Sulfa (sulfonamide antibiotics) [sulfa drugs], and Vancomycin    Past Medical History:   Diagnosis Date     Back pain      Furuncle      Hyperlipidemia      Macular degeneration      Macular degeneration      Osteoarthritis      Osteoporosis      SVT (supraventricular tachycardia) (H)      SVT (supraventricular " tachycardia) (H)        Past Surgical History:   Procedure Laterality Date     EP ABLATION SVT Right 6/26/2018    Procedure: EP Ablation Supra Ventricular;  Surgeon: Dany Renae MD;  Location: [facility];  Service:      ESOPHAGOSCOPY, GASTROSCOPY, DUODENOSCOPY (EGD), COMBINED N/A 4/4/2022    Procedure: ESOPHAGOGASTRODUODENOSCOPY with esophageal biopsy;  Surgeon: Ashlyn Acuna MD;  Location: [facility] GI     GASTROSTOMY, INSERT TUBE, COMBINED N/A 2/9/2021    Procedure: PLACEMENT OF GASTROSTOMY TUBE,;  Surgeon: Clinton Pavon MD;  Location: [facility];  Service: General     HC REMOVAL ADENOIDS,PRIMARY,<13 Y/O      Description: Adenoidectomy;  Recorded: 12/31/2007;     HC REMOVAL OF TONSILS,<13 Y/O      Description: Tonsillectomy;  Recorded: 12/31/2007;     HYSTERECTOMY       LAPAROTOMY EXPLORATORY N/A 2/9/2021    Procedure: EXPLORATORY LAPAROTOMY,  REPAIR GRAHM PATCH OF DUODENAL ULCER;  Surgeon: Clinton Pavon MD;  Location: [facility];  Service: General     OOPHORECTOMY       [facility] APPENDECTOMY      Description: Appendectomy;  Recorded: 12/31/2007;     [facility] TOTAL KNEE ARTHROPLASTY      Description: Total Knee Arthroplasty;  Recorded: 12/31/2007;     [facility] VAG HYST,RMV TUBE/OVARY,FIX ENTEROCE      Description: Vag Hysterect Uterus <250g Remov Both Ovaries Rep Enterocele;  Recorded: 12/31/2007;       CURRENT MEDS:  Current Outpatient Medications   Medication     calcium carbonate (OS-DOMONIQUE) 600 mg (1,500 mg) tablet     cholecalciferol, vitamin D3, 5,000 unit Tab     levothyroxine (SYNTHROID/LEVOTHROID) 25 MCG tablet     metoprolol succinate ER (TOPROL-XL) 50 MG 24 hr tablet     niacin 500 MG tablet     OMEGA-3/DHA/EPA/FISH OIL (FISH OIL-OMEGA-3 FATTY ACIDS) 300-1,000 mg capsule     pantoprazole (PROTONIX) 40 MG EC tablet     simvastatin (ZOCOR) 20 MG tablet     VIT C/E/ZN/COPPR/LUTEIN/ZEAXAN (PRESERVISION AREDS 2 ORAL)     Current Facility-Administered Medications   Medication      denosumab (PROLIA) injection 60 mg       Family History   Problem Relation Age of Onset     Cerebrovascular Disease Father         reports that she has never smoked. She has never used smokeless tobacco. She reports that she does not drink alcohol and does not use drugs.    Review of Systems:  Negative except history of severe reflux hiatal hernia but she has not had any troubles or problems since I fixed her duodenal perforation and pexied her stomach.  He is unsure of whether I actually repaired rhinal hernia or not.  This price upon the biggest part of her questioning today.Otherwise twelve system of review is negative.      OBJECTIVE:  There were no vitals filed for this visit.  There is no height or weight on file to calculate BMI.    Status: doing well    EXAM:  No exam was done today this is a telephone visit        LABS:  Lab Results   Component Value Date    WBC 7.1 03/31/2022    HGB 13.6 03/31/2022    HCT 40.5 03/31/2022    MCV 91 03/31/2022     03/31/2022       Lab Results   Component Value Date    ALT 12 03/31/2022    AST 22 03/31/2022    ALKPHOS 70 03/31/2022        Images: Reviewed her prior CT scan of her abdomen prior to her exploratory laparotomy    Assessment/Plan:   Status post explore lap repair of duodenal perforation pexied of stomach secondary to large hiatal hernia.  Downturning is probably because of her issues both her ulcer and her reflux she now does not have any problems with reflux or other issues at all she had a recent EGD by GI doc.  She stated the GI doc told her she does need any more EKG EGDs that she is too old to have any intervention.  I think this kind of bothered her in she calls me to find out about what she had done in she should have yearly surveillance and what other chronic issues she should do as far as medications her reflux since she has no reflux after surgery.  I told her to stick on her reflux meds I think it certainly good thing to do I think she can get a  yearly EGD if she wants the reason to be done to make sure she does not have developing Veliz's.  I think that if she has trouble with that Keerthi does not want to do that she could arrange through our clinic our doctors here do do surveillance EGDs.  We spent a lot of time to 20 minutes just discussing things answering questions at the end she feels comfortable with how things are going and happy with her result so far.    No follow-ups on file.     Clinton Pavon MD ,MD  [facility] Department of Surgery    Phone call duration: 20 minutes

## 2022-04-28 ENCOUNTER — HOSPITAL ENCOUNTER (OUTPATIENT)
Dept: CARDIOLOGY | Facility: HOSPITAL | Age: 87
Discharge: HOME | End: 2022-04-28
Attending: INTERNAL MEDICINE | Admitting: INTERNAL MEDICINE
Payer: COMMERCIAL

## 2022-04-28 DIAGNOSIS — I34.0 NONRHEUMATIC MITRAL VALVE REGURGITATION: ICD-10-CM

## 2022-04-28 LAB — LVEF ECHO: NORMAL

## 2022-04-28 PROCEDURE — 93306 TTE W/DOPPLER COMPLETE: CPT | Mod: 26 | Performed by: INTERNAL MEDICINE

## 2022-04-28 PROCEDURE — 93306 TTE W/DOPPLER COMPLETE: CPT

## 2022-05-03 ENCOUNTER — TELEPHONE (OUTPATIENT)
Dept: CARDIOLOGY | Facility: CLINIC | Age: 87
End: 2022-05-03
Payer: COMMERCIAL

## 2022-05-03 NOTE — TELEPHONE ENCOUNTER
Health Call Center    Phone Message    May a detailed message be left on voicemail: yes     Reason for Call: Requesting Results   Name/type of test: echo  Date of test: 4/28  Was test done at a location other than [facility] (Please fill in the location if not [facility])?: no      Action Taken: Message routed to:  Other: [facility]    Travel Screening: Not Applicable

## 2022-07-25 ENCOUNTER — TELEPHONE (OUTPATIENT)
Dept: ENDOCRINOLOGY | Facility: CLINIC | Age: 87
End: 2022-07-25

## 2022-07-25 DIAGNOSIS — M81.0 SENILE OSTEOPOROSIS: Primary | ICD-10-CM

## 2022-07-25 NOTE — TELEPHONE ENCOUNTER
[facility]    Phone Message    May a detailed message be left on voicemail: yes     Reason for Call: Order(s): Other:   Reason for requested: DEXA order needs to be extended    Date needed: whenever possible    Provider name: Mike    Please extend orders so pt can schedule.       Action Taken: Message routed to:  Other: endo    Travel Screening: Not Applicable

## 2022-07-26 ENCOUNTER — ALLIED HEALTH/NURSE VISIT (OUTPATIENT)
Dept: ENDOCRINOLOGY | Facility: CLINIC | Age: 87
End: 2022-07-26
Payer: COMMERCIAL

## 2022-07-26 DIAGNOSIS — M81.0 SENILE OSTEOPOROSIS: Primary | ICD-10-CM

## 2022-07-26 DIAGNOSIS — Z92.29 PERSONAL HISTORY OF OTHER DRUG THERAPY: ICD-10-CM

## 2022-07-26 PROCEDURE — 96372 THER/PROPH/DIAG INJ SC/IM: CPT | Performed by: NURSE PRACTITIONER

## 2022-07-26 PROCEDURE — 99207 PR NO CHARGE NURSE ONLY: CPT

## 2022-07-26 NOTE — PROGRESS NOTES
"Prolia Injection Phone Screen      Screening questions have been asked 2-3 days prior to administration visit for Prolia. If any questions are answered with \"Yes,\" this phone encounter were will routed to ordering provider for further evaluation.     1.  When was the last injection?  1/11/22    2.  Has insurance for this injection been verified?  Yes    3.  Did you experience any new onset achiness or rashes that lasted for over a month with your previous Prolia injection?   No    4.  Do you have a fever over 101?F or a new deep cough that is unusual for you today? No    5.  Have you started any new medications in the last 6 months that you were told could affect your immune system? These may have been prescribed by oncologist, transplant, rheumatology, or dermatology.   No    6.  In the last 6 months have you have gastric bypass or parathyroid surgery?   No    7.  Do you plan dental work requiring drilling into the bone such as implants/extractions or oral surgery in the next 2-3 months?   No    8. Do you have new insurance since the last injection?    Patient informed if symptoms discussed above present prior to their administration appointment, they are to notify clinic immediately.     Bee CHU RN          The following steps were completed to comply with the REMS program for Prolia:  1. Ordering provider has previously reviewed information in the Medication Guide and Patient Counseling Chart, including the serious risks of Prolia  and the symptoms of each risk and have been advised to seek prompt medical attention if they have signs or symptoms of any of the serious risks.  2. Provided each patient a copy of the Medication Guide and Patient Brochure.  See MAR for administration details.   Indication: Prolia  (denosumab) is a prescription medicine used to treat osteoporosis in patients who:   Are at high risk for fracture, meaning patients who have had a fracture related to osteoporosis, or who have " multiple risk factors for fracture; Cannot use another osteoporosis medicine or other osteoporosis medicines did not work well.   The timeline for early/late injections would be 4 weeks early and any time after the 6 month nomi. If a patient receives their injection late, then the subsequent injection would be 6 months from the date that they actually received the injection    Have the screening questions been asked prior to this administration? Yes    The following medication was given:     MEDICATION: Denosumab (Prolia) 60 mg/ml SOLN  ROUTE: SQ  SITE: Arm - Left  DOSE: 60mg  LOT #: [id number]  :  Bellabox  EXPIRATION DATE:  07/24

## 2022-09-25 ENCOUNTER — HEALTH MAINTENANCE LETTER (OUTPATIENT)
Age: 87
End: 2022-09-25

## 2022-10-05 ENCOUNTER — OFFICE VISIT (OUTPATIENT)
Dept: ENDOCRINOLOGY | Facility: CLINIC | Age: 87
End: 2022-10-05
Payer: COMMERCIAL

## 2022-10-05 VITALS
HEART RATE: 60 BPM | BODY MASS INDEX: 23.81 KG/M2 | SYSTOLIC BLOOD PRESSURE: 128 MMHG | DIASTOLIC BLOOD PRESSURE: 80 MMHG | WEIGHT: 126 LBS

## 2022-10-05 DIAGNOSIS — M81.0 SENILE OSTEOPOROSIS: Primary | ICD-10-CM

## 2022-10-05 PROCEDURE — 99213 OFFICE O/P EST LOW 20 MIN: CPT | Performed by: NURSE PRACTITIONER

## 2022-10-05 NOTE — LETTER
10/5/2022         RE: Maria D Moore  [address]  [facility] [address]        Dear Colleague,    Thank you for referring your patient, Maria D Moore, to the [facility]. Please see a copy of my visit note below.    [facility]  ENDOCRINOLOGY    Osteoporosis Follow Up 10/5/2022    Maria D Moore, 11/24/1934, [id number]          Reason for visit      1. Osteoporosis Senile        History     Maria D Moore is a very pleasant 87 year old old female who presents for follow up.   SUMMARY:  As you know, she took a fall when she became syncopal with atrial fibrillation on August 13th, 2010. Initially plain radiographs were negative but with persistent pain. A subsequent CT scan on September 26th, 2010, did show the sacral insufficiency fractures both sacral wings extending into the S2 sacral body and nondisplaced fracture of the right pubic bone. MRI in September 2010 confirmed these findings. A followup CT done on March 9 2011, indicates again a broad based bilateral sacral insufficiency fractures and ununited fracture of the right pubic bone. She is here for further recommendations. I should note that at the time she was hospitalized with her fractures, she was told that her vitamin D level was low and she was treated with 52741 International Units of vitamin D weekly for three months. Subsequently, her level chuck just above 50 ng/mL has had serious  pelvic fractures while on bisphosphonates for at least six years. A 78-year-old woman with severe osteoporosis who has completed 2 years of teriparatide.  We discussed options for following with antiresorptive agent and I do think that denosumab would be preferable for her and she was in agreement with this plan.    TODAY:    Maria D is here today in f/u for Osteoporosis. She has been getting Prolia injections without difficulty until her most recent one. She reports that she had an achy arm for two days.  She remains quite active and went to [place] with her family this summer. She is still working one day a week. They come and collect her and bring her home. She has had no falls in the last year. She is on the cusp of getting her next Dexa Scan. Vit D level was last 82 (March) and she has cut back on her supplementation. Last Calcium level was 10.4.     Risk Factors     The following high- risk conditions have been ruled out: celiac disease, eating disorders, gastric bypass, hyperparathyroidism, inflammatory bowel disease, hyperthyroidism, rheumatoid arthritis, lupus, chronic kidney disease.     Maria D Moore has the following risk factors: Age, Female gender,  and Low BMI     She is not on high risk medications such as glucocorticoids, anti-coagulants, anti-convulsants, chemotherapy or levothyroxine.    Patient deniesHysterectomy, Oophrectomy, Breast cancer and Family history of breast cancer.      Past Medical History     Patient Active Problem List   Diagnosis     Fatigue     Temperature Intolerance To Cold   (Consistent)     Abnormal Blood Chemistry     Osteoarthritis Of The Ankle/Foot (Multiple Joints)     Mixed hyperlipidemia     Furuncle     Generalized Osteoarthritis Of Multiple Sites     Edema     Osteoporosis Senile     Vaginal Discharge (Symptom)     Back Pain     Automatic Atrial Tachycardia     Lightheadedness     SVT (supraventricular tachycardia) (H)     Hypotension, unspecified hypotension type     Actinic keratosis     Hair thinning     Free intraperitoneal air     Duodenal ulcer with perforation (H)     Hiatal hernia     S/P exploratory laparotomy     Perforated duodenal ulcer (H)     Hypothyroidism     Furuncle     Lightheadedness     Personal history of other drug therapy       Family History       family history includes Cerebrovascular Disease in her father.    Social History      reports that she has never smoked. She has never used smokeless tobacco. She reports that she does not  drink alcohol and does not use drugs.      Review of Systems     Patient denies current pain, limited mobility, fractures.  Remainder per HPI.      Vital Signs     /80 (BP Location: Left arm, Patient Position: Sitting, Cuff Size: Adult Regular)   Pulse 60   Wt 57.2 kg (126 lb)   BMI 23.81 kg/m      Physical Exam     Constitutional:  Well developed, Well nourished  HENT:  Normocephalic,   Neck: normal in appearance  Eyes:  PERRL, Conjunctiva pink  Respiratory:  No respiratory distress  Skin: No acanthosis nigricans, lipoatrophy or lipodystrophy  Neurologic:  Alert & oriented x 3, nonfocal  Psychiatric:  Affect, Mood, Insight appropriate        Assessment     1. Osteoporosis Senile        Plan     Maria D will continue on the Prolia injections. She will get her Dexa Scan done and we will f/u regarding it afterward. F/u with me in 1 year.         Deisy Mckeon NP  [facility]  10/5/2022  10:40 AM      Current Medications     Outpatient Medications Prior to Visit   Medication Sig Dispense Refill     calcium carbonate (OS-DOMONIQUE) 600 mg (1,500 mg) tablet [CALCIUM CARBONATE (OS-DOMONIQUE) 600 MG (1,500 MG) TABLET] Take 600 mg by mouth every other day.              cholecalciferol, vitamin D3, 5,000 unit Tab Take 125 mcg by mouth daily        levothyroxine (SYNTHROID/LEVOTHROID) 25 MCG tablet [LEVOTHYROXINE (SYNTHROID, LEVOTHROID) 25 MCG TABLET] TAKE 1 TABLET DAILY AT 6:00 A.M. 90 tablet 3     metoprolol succinate ER (TOPROL-XL) 50 MG 24 hr tablet Take 1 tablet (50 mg) by mouth At Bedtime 90 tablet 3     niacin 500 MG tablet [NIACIN 500 MG TABLET] Take 500 mg by mouth daily with breakfast.       OMEGA-3/DHA/EPA/FISH OIL (FISH OIL-OMEGA-3 FATTY ACIDS) 300-1,000 mg capsule [OMEGA-3/DHA/EPA/FISH OIL (FISH OIL-OMEGA-3 FATTY ACIDS) 300-1,000 MG CAPSULE] Take 2,000 mg by mouth daily.        pantoprazole (PROTONIX) 40 MG EC tablet Take 1 tablet (40 mg) by mouth 2 times daily (Patient taking differently: Take 40 mg by mouth  daily) 180 tablet 3     simvastatin (ZOCOR) 20 MG tablet [SIMVASTATIN (ZOCOR) 20 MG TABLET] TAKE 1 TABLET DAILY AT BEDTIME 90 tablet 3     VIT C/E/ZN/COPPR/LUTEIN/ZEAXAN (PRESERVISION AREDS 2 ORAL) [VIT C/E/ZN/COPPR/LUTEIN/ZEAXAN (PRESERVISION AREDS 2 ORAL)] Take 1 tablet by mouth 2 (two) times a day.       Facility-Administered Medications Prior to Visit   Medication Dose Route Frequency Provider Last Rate Last Admin     denosumab (PROLIA) injection 60 mg  60 mg Subcutaneous Q6 Months Deisy Mckeon NP   60 mg at 07/26/22 0856         Lab Results     TSH   Date Value Ref Range Status   03/31/2022 2.62 0.30 - 5.00 uIU/mL Final           Imaging Results   Last DEXA scan:  No valid procedures specified.        Again, thank you for allowing me to participate in the care of your patient.        Sincerely,        Deisy Mckeon NP

## 2022-10-05 NOTE — PROGRESS NOTES
[facility]  ENDOCRINOLOGY    Osteoporosis Follow Up 10/5/2022    Maria D Moore, 11/24/1934, [id number]          Reason for visit      1. Osteoporosis Senile        History     Maria D Moore is a very pleasant 87 year old old female who presents for follow up.   SUMMARY:  As you know, she took a fall when she became syncopal with atrial fibrillation on August 13th, 2010. Initially plain radiographs were negative but with persistent pain. A subsequent CT scan on September 26th, 2010, did show the sacral insufficiency fractures both sacral wings extending into the S2 sacral body and nondisplaced fracture of the right pubic bone. MRI in September 2010 confirmed these findings. A followup CT done on March 9 2011, indicates again a broad based bilateral sacral insufficiency fractures and ununited fracture of the right pubic bone. She is here for further recommendations. I should note that at the time she was hospitalized with her fractures, she was told that her vitamin D level was low and she was treated with 43836 International Units of vitamin D weekly for three months. Subsequently, her level chuck just above 50 ng/mL has had serious  pelvic fractures while on bisphosphonates for at least six years. A 78-year-old woman with severe osteoporosis who has completed 2 years of teriparatide.  We discussed options for following with antiresorptive agent and I do think that denosumab would be preferable for her and she was in agreement with this plan.    TODAY:    Maria D is here today in f/u for Osteoporosis. She has been getting Prolia injections without difficulty until her most recent one. She reports that she had an achy arm for two days. She remains quite active and went to [place] with her family this summer. She is still working one day a week. They come and collect her and bring her home. She has had no falls in the last year. She is on the cusp of getting her next Dexa Scan. Vit D level was last  82 (March) and she has cut back on her supplementation. Last Calcium level was 10.4.     Risk Factors     The following high- risk conditions have been ruled out: celiac disease, eating disorders, gastric bypass, hyperparathyroidism, inflammatory bowel disease, hyperthyroidism, rheumatoid arthritis, lupus, chronic kidney disease.     Maria D Moore has the following risk factors: Age, Female gender,  and Low BMI     She is not on high risk medications such as glucocorticoids, anti-coagulants, anti-convulsants, chemotherapy or levothyroxine.    Patient deniesHysterectomy, Oophrectomy, Breast cancer and Family history of breast cancer.      Past Medical History     Patient Active Problem List   Diagnosis     Fatigue     Temperature Intolerance To Cold   (Consistent)     Abnormal Blood Chemistry     Osteoarthritis Of The Ankle/Foot (Multiple Joints)     Mixed hyperlipidemia     Furuncle     Generalized Osteoarthritis Of Multiple Sites     Edema     Osteoporosis Senile     Vaginal Discharge (Symptom)     Back Pain     Automatic Atrial Tachycardia     Lightheadedness     SVT (supraventricular tachycardia) (H)     Hypotension, unspecified hypotension type     Actinic keratosis     Hair thinning     Free intraperitoneal air     Duodenal ulcer with perforation (H)     Hiatal hernia     S/P exploratory laparotomy     Perforated duodenal ulcer (H)     Hypothyroidism     Furuncle     Lightheadedness     Personal history of other drug therapy       Family History       family history includes Cerebrovascular Disease in her father.    Social History      reports that she has never smoked. She has never used smokeless tobacco. She reports that she does not drink alcohol and does not use drugs.      Review of Systems     Patient denies current pain, limited mobility, fractures.  Remainder per HPI.      Vital Signs     /80 (BP Location: Left arm, Patient Position: Sitting, Cuff Size: Adult Regular)   Pulse 60    Wt 57.2 kg (126 lb)   BMI 23.81 kg/m      Physical Exam     Constitutional:  Well developed, Well nourished  HENT:  Normocephalic,   Neck: normal in appearance  Eyes:  PERRL, Conjunctiva pink  Respiratory:  No respiratory distress  Skin: No acanthosis nigricans, lipoatrophy or lipodystrophy  Neurologic:  Alert & oriented x 3, nonfocal  Psychiatric:  Affect, Mood, Insight appropriate        Assessment     1. Osteoporosis Senile        Plan     Maria D will continue on the Prolia injections. She will get her Dexa Scan done and we will f/u regarding it afterward. F/u with me in 1 year.         Deisy Mckeon NP  [facility]  10/5/2022  10:40 AM      Current Medications     Outpatient Medications Prior to Visit   Medication Sig Dispense Refill     calcium carbonate (OS-DOMONIQUE) 600 mg (1,500 mg) tablet [CALCIUM CARBONATE (OS-DOMONIQUE) 600 MG (1,500 MG) TABLET] Take 600 mg by mouth every other day.              cholecalciferol, vitamin D3, 5,000 unit Tab Take 125 mcg by mouth daily        levothyroxine (SYNTHROID/LEVOTHROID) 25 MCG tablet [LEVOTHYROXINE (SYNTHROID, LEVOTHROID) 25 MCG TABLET] TAKE 1 TABLET DAILY AT 6:00 A.M. 90 tablet 3     metoprolol succinate ER (TOPROL-XL) 50 MG 24 hr tablet Take 1 tablet (50 mg) by mouth At Bedtime 90 tablet 3     niacin 500 MG tablet [NIACIN 500 MG TABLET] Take 500 mg by mouth daily with breakfast.       OMEGA-3/DHA/EPA/FISH OIL (FISH OIL-OMEGA-3 FATTY ACIDS) 300-1,000 mg capsule [OMEGA-3/DHA/EPA/FISH OIL (FISH OIL-OMEGA-3 FATTY ACIDS) 300-1,000 MG CAPSULE] Take 2,000 mg by mouth daily.        pantoprazole (PROTONIX) 40 MG EC tablet Take 1 tablet (40 mg) by mouth 2 times daily (Patient taking differently: Take 40 mg by mouth daily) 180 tablet 3     simvastatin (ZOCOR) 20 MG tablet [SIMVASTATIN (ZOCOR) 20 MG TABLET] TAKE 1 TABLET DAILY AT BEDTIME 90 tablet 3     VIT C/E/ZN/COPPR/LUTEIN/ZEAXAN (PRESERVISION AREDS 2 ORAL) [VIT C/E/ZN/COPPR/LUTEIN/ZEAXAN (PRESERVISION AREDS 2 ORAL)] Take 1  tablet by mouth 2 (two) times a day.       Facility-Administered Medications Prior to Visit   Medication Dose Route Frequency Provider Last Rate Last Admin     denosumab (PROLIA) injection 60 mg  60 mg Subcutaneous Q6 Months Deisy Mckeon NP   60 mg at 07/26/22 0856         Lab Results     TSH   Date Value Ref Range Status   03/31/2022 2.62 0.30 - 5.00 uIU/mL Final           Imaging Results   Last DEXA scan:  No valid procedures specified.

## 2022-10-24 ENCOUNTER — HOSPITAL ENCOUNTER (OUTPATIENT)
Dept: BONE DENSITY | Facility: HOSPITAL | Age: 87
Discharge: HOME | End: 2022-10-24
Attending: NURSE PRACTITIONER | Admitting: NURSE PRACTITIONER
Payer: COMMERCIAL

## 2022-10-24 ENCOUNTER — TELEPHONE (OUTPATIENT)
Dept: ENDOCRINOLOGY | Facility: CLINIC | Age: 87
End: 2022-10-24

## 2022-10-24 DIAGNOSIS — M81.0 SENILE OSTEOPOROSIS: ICD-10-CM

## 2022-10-24 PROCEDURE — 77080 DXA BONE DENSITY AXIAL: CPT

## 2022-10-24 PROCEDURE — 77081 DXA BONE DENSITY APPENDICULR: CPT

## 2022-10-24 NOTE — TELEPHONE ENCOUNTER
[facility] Call Center    Phone Message    May a detailed message be left on voicemail: yes     Reason for Call: Other: Patient would like someone to call her to discuss results from Dexa scan     Action Taken: Other: Endo    Travel Screening: Not Applicable

## 2022-10-24 NOTE — TELEPHONE ENCOUNTER
I called the pt LM informing that I will review with the provider on the results and next steps and contact her back.

## 2022-10-25 NOTE — TELEPHONE ENCOUNTER
"After looking at previous report from 2 years ago and current report, it was evident that the \"new score\" for her Lumbar region was identical to the one listed for 2 years ago.  I contacted Ryan Martínez regarding this and he is looking into it. Pt informed of this and I let her know that I will contact her when the report is amended  "

## 2022-10-25 NOTE — TELEPHONE ENCOUNTER
I called the pt and informed that her BMD shows a decline in the lumbar spine, however an increase in her hips, bilaterally. The pt is walking 3-4 times per week between 1162-7489 steps. I informed I will speak to TESSIE Skaggs about next steps and contact her back.   Pt was previously on bisphosphonate's with pelvic fractures while on and completed 2 years of forteo.

## 2022-10-27 NOTE — TELEPHONE ENCOUNTER
Patient called to see what the results of her scan were after being re-read. She would like a call back on her mobile number. Please follow up. Thank you.

## 2022-10-28 NOTE — TELEPHONE ENCOUNTER
LM informing pt have not heard back in regards to the scan. Will give call back once we hear more.

## 2022-10-31 ENCOUNTER — ANCILLARY PROCEDURE (OUTPATIENT)
Dept: MAMMOGRAPHY | Facility: CLINIC | Age: 87
End: 2022-10-31
Attending: FAMILY MEDICINE
Payer: COMMERCIAL

## 2022-10-31 DIAGNOSIS — Z12.31 VISIT FOR SCREENING MAMMOGRAM: ICD-10-CM

## 2022-10-31 PROCEDURE — 77067 SCR MAMMO BI INCL CAD: CPT

## 2022-11-16 NOTE — TELEPHONE ENCOUNTER
Patient states that she has never received any messages form us. I did verify the phone numbers with her. The mobile phone was correct, but the home phone was not. It has been updates. Please follow up with patient as soon as possible. Thank you.

## 2022-11-16 NOTE — TELEPHONE ENCOUNTER
I called LM informing that we are still awaiting finalized results. I will call when they are in. Pt is advised to call with questions.

## 2022-12-12 ENCOUNTER — TELEPHONE (OUTPATIENT)
Dept: ENDOCRINOLOGY | Facility: CLINIC | Age: 87
End: 2022-12-12

## 2022-12-12 NOTE — TELEPHONE ENCOUNTER
Spoke to pt informed we do not do telephone visit, can do video or in person.     Patient stated she is coming to appointment for sure tomorrow. She would like if when she arrives if it is slippery if someone can help her walk in. Informed patient she will need to call us when she arrives. If she is not comfortable walking in, will send someone down to help.

## 2022-12-12 NOTE — TELEPHONE ENCOUNTER
[facility] Call Center    Phone Message    May a detailed message be left on voicemail: yes     Reason for Call: Other: Patient stated she does not think she will be able to make appt tomorrow because of the weather. Patient is also not sure how to do a virtual visit but did not want to reschedule out into February. Patient requesting to speak with care team. Please call. Thank you    Action Taken: Message routed to:  Other: endo    Travel Screening: Not Applicable

## 2022-12-12 NOTE — TELEPHONE ENCOUNTER
Called patient, she is going to try to make it to the appointment tomorrow. Did not want reschedule. Pt wanted to speak with nurse, call transferred to [place].

## 2022-12-13 ENCOUNTER — OFFICE VISIT (OUTPATIENT)
Dept: ENDOCRINOLOGY | Facility: CLINIC | Age: 87
End: 2022-12-13
Payer: COMMERCIAL

## 2022-12-13 VITALS
SYSTOLIC BLOOD PRESSURE: 122 MMHG | DIASTOLIC BLOOD PRESSURE: 78 MMHG | WEIGHT: 130 LBS | HEART RATE: 68 BPM | BODY MASS INDEX: 24.56 KG/M2

## 2022-12-13 DIAGNOSIS — M81.0 SENILE OSTEOPOROSIS: Primary | ICD-10-CM

## 2022-12-13 PROCEDURE — 99213 OFFICE O/P EST LOW 20 MIN: CPT | Performed by: NURSE PRACTITIONER

## 2022-12-13 NOTE — LETTER
12/13/2022         RE: Maria D Moore  [address]  [facility] [address]        Dear Colleague,    Thank you for referring your patient, Maria D Moore, to the [facility]. Please see a copy of my visit note below.    [facility]  ENDOCRINOLOGY    Osteoporosis Follow Up 12/13/2022    Maria D Moore, 11/24/1934, [id number]          Reason for visit      1. Osteoporosis Senile        History     Maria D Moore is a very pleasant 88 year old old female who presents for follow up.   SUMMARY:  As you know, she took a fall when she became syncopal with atrial fibrillation on August 13th, 2010. Initially plain radiographs were negative but with persistent pain. A subsequent CT scan on September 26th, 2010, did show the sacral insufficiency fractures both sacral wings extending into the S2 sacral body and nondisplaced fracture of the right pubic bone. MRI in September 2010 confirmed these findings. A followup CT done on March 9 2011, indicates again a broad based bilateral sacral insufficiency fractures and ununited fracture of the right pubic bone. She is here for further recommendations. I should note that at the time she was hospitalized with her fractures, she was told that her vitamin D level was low and she was treated with 54294 International Units of vitamin D weekly for three months. Subsequently, her level chuck just above 50 ng/mL has had serious  pelvic fractures while on bisphosphonates for at least six years. A 78-year-old woman with severe osteoporosis who has completed 2 years of teriparatide.  We discussed options for following with antiresorptive agent and I do think that denosumab would be preferable for her and she was in agreement with this plan.    TODAY:    Maria D is here today to discuss the findings on her most recent Dexa Scan. She had a significant loss of BMD marked in her Spine, of >7%. With further inspection and assistance from  Ryan Martínez, Lead Dexa Tech, Dr Nomi Miranda, Endocrinology, and Dr Minerva Painter, Endocrinology, it was found that L 3 and L 4 were included in the scoring and should not have been. Findings in bilateral hips and Forearm were stable.  The question was whether pt was failing the Prolia injections that she has been getting.     Risk Factors     The following high- risk conditions have been ruled out: celiac disease, eating disorders, gastric bypass, hyperparathyroidism, inflammatory bowel disease, hyperthyroidism, rheumatoid arthritis, lupus, chronic kidney disease.     Maria D Moore has the following risk factors: Age, Female gender,  and Low BMI     She is not on high risk medications such as glucocorticoids, anti-coagulants, anti-convulsants, chemotherapy or levothyroxine.    Patient deniesHysterectomy, Oophrectomy, Breast cancer and Family history of breast cancer.      Past Medical History     Patient Active Problem List   Diagnosis     Fatigue     Temperature Intolerance To Cold   (Consistent)     Abnormal Blood Chemistry     Osteoarthritis Of The Ankle/Foot (Multiple Joints)     Mixed hyperlipidemia     Generalized Osteoarthritis Of Multiple Sites     Edema     Osteoporosis Senile     Vaginal Discharge (Symptom)     Back Pain     Automatic Atrial Tachycardia     SVT (supraventricular tachycardia) (H)     Hypotension, unspecified hypotension type     Actinic keratosis     Hair thinning     Free intraperitoneal air     Duodenal ulcer with perforation (H)     Hiatal hernia     S/P exploratory laparotomy     Perforated duodenal ulcer (H)     Hypothyroidism     Furuncle     Lightheadedness     Personal history of other drug therapy       Family History       family history includes Cerebrovascular Disease in her father.    Social History      reports that she has never smoked. She has never used smokeless tobacco. She reports that she does not drink alcohol and does not use drugs.      Review of  Systems     Patient denies current pain, limited mobility, fractures.   Remainder per HPI.      Vital Signs     /78 (BP Location: Left arm, Patient Position: Sitting, Cuff Size: Adult Regular)   Pulse 68   Wt 59 kg (130 lb)   BMI 24.56 kg/m      Physical Exam     Constitutional:  Well developed, Well nourished  HENT:  Normocephalic,   Neck: normal in appearance  Eyes:  PERRL, Conjunctiva pink  Respiratory:  No respiratory distress  Skin: No acanthosis nigricans, lipoatrophy or lipodystrophy  Neurologic:  Alert & oriented x 3, nonfocal  Psychiatric:  Affect, Mood, Insight appropriate        Assessment     1. Osteoporosis Senile        Plan     Per findings by previously named Providers and experts, we will continue with the Prolia injections and will f/u with me in October of next year.         Deisy Mckeon NP  [facility]  12/13/2022  2:32 PM      Current Medications     Outpatient Medications Prior to Visit   Medication Sig Dispense Refill     calcium carbonate (OS-DOMONIQUE) 600 mg (1,500 mg) tablet [CALCIUM CARBONATE (OS-DOMONIQUE) 600 MG (1,500 MG) TABLET] Take 600 mg by mouth every other day.              cholecalciferol, vitamin D3, 5,000 unit Tab Take 125 mcg by mouth daily        levothyroxine (SYNTHROID/LEVOTHROID) 25 MCG tablet [LEVOTHYROXINE (SYNTHROID, LEVOTHROID) 25 MCG TABLET] TAKE 1 TABLET DAILY AT 6:00 A.M. 90 tablet 3     metoprolol succinate ER (TOPROL-XL) 50 MG 24 hr tablet Take 1 tablet (50 mg) by mouth At Bedtime 90 tablet 3     niacin 500 MG tablet [NIACIN 500 MG TABLET] Take 500 mg by mouth daily with breakfast.       OMEGA-3/DHA/EPA/FISH OIL (FISH OIL-OMEGA-3 FATTY ACIDS) 300-1,000 mg capsule [OMEGA-3/DHA/EPA/FISH OIL (FISH OIL-OMEGA-3 FATTY ACIDS) 300-1,000 MG CAPSULE] Take 2,000 mg by mouth daily.        pantoprazole (PROTONIX) 40 MG EC tablet Take 1 tablet (40 mg) by mouth 2 times daily (Patient taking differently: Take 40 mg by mouth daily) 180 tablet 3     simvastatin (ZOCOR) 20 MG  tablet [SIMVASTATIN (ZOCOR) 20 MG TABLET] TAKE 1 TABLET DAILY AT BEDTIME 90 tablet 3     VIT C/E/ZN/COPPR/LUTEIN/ZEAXAN (PRESERVISION AREDS 2 ORAL) [VIT C/E/ZN/COPPR/LUTEIN/ZEAXAN (PRESERVISION AREDS 2 ORAL)] Take 1 tablet by mouth 2 (two) times a day.       Facility-Administered Medications Prior to Visit   Medication Dose Route Frequency Provider Last Rate Last Admin     denosumab (PROLIA) injection 60 mg  60 mg Subcutaneous Q6 Months Deisy Mckeon NP   60 mg at 07/26/22 0856         Lab Results     TSH   Date Value Ref Range Status   03/31/2022 2.62 0.30 - 5.00 uIU/mL Final           Imaging Results   Last DEXA scan:  No valid procedures specified.        Again, thank you for allowing me to participate in the care of your patient.        Sincerely,        Deisy Mckeon NP

## 2022-12-13 NOTE — PROGRESS NOTES
[facility]  ENDOCRINOLOGY    Osteoporosis Follow Up 12/13/2022    Maria D Moore, 11/24/1934, [id number]          Reason for visit      1. Osteoporosis Senile        History     Maria D Moore is a very pleasant 88 year old old female who presents for follow up.   SUMMARY:  As you know, she took a fall when she became syncopal with atrial fibrillation on August 13th, 2010. Initially plain radiographs were negative but with persistent pain. A subsequent CT scan on September 26th, 2010, did show the sacral insufficiency fractures both sacral wings extending into the S2 sacral body and nondisplaced fracture of the right pubic bone. MRI in September 2010 confirmed these findings. A followup CT done on March 9 2011, indicates again a broad based bilateral sacral insufficiency fractures and ununited fracture of the right pubic bone. She is here for further recommendations. I should note that at the time she was hospitalized with her fractures, she was told that her vitamin D level was low and she was treated with 68422 International Units of vitamin D weekly for three months. Subsequently, her level chuck just above 50 ng/mL has had serious  pelvic fractures while on bisphosphonates for at least six years. A 78-year-old woman with severe osteoporosis who has completed 2 years of teriparatide.  We discussed options for following with antiresorptive agent and I do think that denosumab would be preferable for her and she was in agreement with this plan.    TODAY:    Maria D is here today to discuss the findings on her most recent Dexa Scan. She had a significant loss of BMD marked in her Spine, of >7%. With further inspection and assistance from Ryan Martínez, Lead Dexa Tech, Dr Nomi Miranda, Endocrinology, and Dr Minerva Painter, Endocrinology, it was found that L 3 and L 4 were included in the scoring and should not have been. Findings in bilateral hips and Forearm were stable.  The question was whether pt  was failing the Prolia injections that she has been getting.     Risk Factors     The following high- risk conditions have been ruled out: celiac disease, eating disorders, gastric bypass, hyperparathyroidism, inflammatory bowel disease, hyperthyroidism, rheumatoid arthritis, lupus, chronic kidney disease.     Maria D Moore has the following risk factors: Age, Female gender,  and Low BMI     She is not on high risk medications such as glucocorticoids, anti-coagulants, anti-convulsants, chemotherapy or levothyroxine.    Patient deniesHysterectomy, Oophrectomy, Breast cancer and Family history of breast cancer.      Past Medical History     Patient Active Problem List   Diagnosis     Fatigue     Temperature Intolerance To Cold   (Consistent)     Abnormal Blood Chemistry     Osteoarthritis Of The Ankle/Foot (Multiple Joints)     Mixed hyperlipidemia     Generalized Osteoarthritis Of Multiple Sites     Edema     Osteoporosis Senile     Vaginal Discharge (Symptom)     Back Pain     Automatic Atrial Tachycardia     SVT (supraventricular tachycardia) (H)     Hypotension, unspecified hypotension type     Actinic keratosis     Hair thinning     Free intraperitoneal air     Duodenal ulcer with perforation (H)     Hiatal hernia     S/P exploratory laparotomy     Perforated duodenal ulcer (H)     Hypothyroidism     Furuncle     Lightheadedness     Personal history of other drug therapy       Family History       family history includes Cerebrovascular Disease in her father.    Social History      reports that she has never smoked. She has never used smokeless tobacco. She reports that she does not drink alcohol and does not use drugs.      Review of Systems     Patient denies current pain, limited mobility, fractures.   Remainder per HPI.      Vital Signs     /78 (BP Location: Left arm, Patient Position: Sitting, Cuff Size: Adult Regular)   Pulse 68   Wt 59 kg (130 lb)   BMI 24.56 kg/m      Physical Exam      Constitutional:  Well developed, Well nourished  HENT:  Normocephalic,   Neck: normal in appearance  Eyes:  PERRL, Conjunctiva pink  Respiratory:  No respiratory distress  Skin: No acanthosis nigricans, lipoatrophy or lipodystrophy  Neurologic:  Alert & oriented x 3, nonfocal  Psychiatric:  Affect, Mood, Insight appropriate        Assessment     1. Osteoporosis Senile        Plan     Per findings by previously named Providers and experts, we will continue with the Prolia injections and will f/u with me in October of next year.         Deisy Mckeon NP  [facility]  12/13/2022  2:32 PM      Current Medications     Outpatient Medications Prior to Visit   Medication Sig Dispense Refill     calcium carbonate (OS-DOMONIQUE) 600 mg (1,500 mg) tablet [CALCIUM CARBONATE (OS-DOMONIQUE) 600 MG (1,500 MG) TABLET] Take 600 mg by mouth every other day.              cholecalciferol, vitamin D3, 5,000 unit Tab Take 125 mcg by mouth daily        levothyroxine (SYNTHROID/LEVOTHROID) 25 MCG tablet [LEVOTHYROXINE (SYNTHROID, LEVOTHROID) 25 MCG TABLET] TAKE 1 TABLET DAILY AT 6:00 A.M. 90 tablet 3     metoprolol succinate ER (TOPROL-XL) 50 MG 24 hr tablet Take 1 tablet (50 mg) by mouth At Bedtime 90 tablet 3     niacin 500 MG tablet [NIACIN 500 MG TABLET] Take 500 mg by mouth daily with breakfast.       OMEGA-3/DHA/EPA/FISH OIL (FISH OIL-OMEGA-3 FATTY ACIDS) 300-1,000 mg capsule [OMEGA-3/DHA/EPA/FISH OIL (FISH OIL-OMEGA-3 FATTY ACIDS) 300-1,000 MG CAPSULE] Take 2,000 mg by mouth daily.        pantoprazole (PROTONIX) 40 MG EC tablet Take 1 tablet (40 mg) by mouth 2 times daily (Patient taking differently: Take 40 mg by mouth daily) 180 tablet 3     simvastatin (ZOCOR) 20 MG tablet [SIMVASTATIN (ZOCOR) 20 MG TABLET] TAKE 1 TABLET DAILY AT BEDTIME 90 tablet 3     VIT C/E/ZN/COPPR/LUTEIN/ZEAXAN (PRESERVISION AREDS 2 ORAL) [VIT C/E/ZN/COPPR/LUTEIN/ZEAXAN (PRESERVISION AREDS 2 ORAL)] Take 1 tablet by mouth 2 (two) times a day.        Facility-Administered Medications Prior to Visit   Medication Dose Route Frequency Provider Last Rate Last Admin     denosumab (PROLIA) injection 60 mg  60 mg Subcutaneous Q6 Months Deisy Mckeon NP   60 mg at 07/26/22 0856         Lab Results     TSH   Date Value Ref Range Status   03/31/2022 2.62 0.30 - 5.00 uIU/mL Final           Imaging Results   Last DEXA scan:  No valid procedures specified.

## 2023-01-23 ENCOUNTER — TELEPHONE (OUTPATIENT)
Dept: ENDOCRINOLOGY | Facility: CLINIC | Age: 88
End: 2023-01-23
Payer: COMMERCIAL

## 2023-01-23 DIAGNOSIS — Z92.29 PERSONAL HISTORY OF OTHER DRUG THERAPY: ICD-10-CM

## 2023-01-23 DIAGNOSIS — M81.0 SENILE OSTEOPOROSIS: Primary | ICD-10-CM

## 2023-02-01 ENCOUNTER — ALLIED HEALTH/NURSE VISIT (OUTPATIENT)
Dept: ENDOCRINOLOGY | Facility: CLINIC | Age: 88
End: 2023-02-01
Payer: COMMERCIAL

## 2023-02-01 DIAGNOSIS — M81.0 SENILE OSTEOPOROSIS: Primary | ICD-10-CM

## 2023-02-01 PROCEDURE — 99207 PR NO CHARGE NURSE ONLY: CPT

## 2023-02-01 PROCEDURE — 96372 THER/PROPH/DIAG INJ SC/IM: CPT | Performed by: NURSE PRACTITIONER

## 2023-02-01 NOTE — PROGRESS NOTES
"Prolia Injection Phone Screen      Screening questions have been asked 2-3 days prior to administration visit for Prolia. If any questions are answered with \"Yes,\" this phone encounter were will routed to ordering provider for further evaluation.     1.  When was the last injection?  7/26/22    2.  Has insurance for this injection been verified?  Yes    3.  Did you experience any new onset achiness or rashes that lasted for over a month with your previous Prolia injection?   No    4.  Do you have a fever over 101?F or a new deep cough that is unusual for you today? No    5.  Have you started any new medications in the last 6 months that you were told could affect your immune system? These may have been prescribed by oncologist, transplant, rheumatology, or dermatology.   No    6.  In the last 6 months have you have gastric bypass or parathyroid surgery?   No    7.  Do you plan dental work requiring drilling into the bone such as implants/extractions or oral surgery in the next 2-3 months?   No    8. Do you have new insurance since the last injection?    Patient informed if symptoms discussed above present prior to their administration appointment, they are to notify clinic immediately.     Minda Kim RN          The following steps were completed to comply with the REMS program for Prolia:  1. Ordering provider has previously reviewed information in the Medication Guide and Patient Counseling Chart, including the serious risks of Prolia  and the symptoms of each risk and have been advised to seek prompt medical attention if they have signs or symptoms of any of the serious risks.  2. Provided each patient a copy of the Medication Guide and Patient Brochure.  See MAR for administration details.   Indication: Prolia  (denosumab) is a prescription medicine used to treat osteoporosis in patients who:   Are at high risk for fracture, meaning patients who have had a fracture related to osteoporosis, or who have " multiple risk factors for fracture; Cannot use another osteoporosis medicine or other osteoporosis medicines did not work well.   The timeline for early/late injections would be 4 weeks early and any time after the 6 month nomi. If a patient receives their injection late, then the subsequent injection would be 6 months from the date that they actually received the injection    Have the screening questions been asked prior to this administration? Yes      The following medication was given:     MEDICATION: Denosumab (Prolia) 60 mg/ml SOLN  ROUTE: SQ  SITE: Thigh - Left  DOSE: 60 MG  LOT #: [id number]  :  [employer]  EXPIRATION DATE:  03/31/2025  NDC#: SEE MAR

## 2023-02-04 ENCOUNTER — HEALTH MAINTENANCE LETTER (OUTPATIENT)
Age: 88
End: 2023-02-04

## 2023-03-13 ENCOUNTER — OFFICE VISIT (OUTPATIENT)
Dept: FAMILY MEDICINE | Facility: CLINIC | Age: 88
End: 2023-03-13
Payer: COMMERCIAL

## 2023-03-13 VITALS
OXYGEN SATURATION: 97 % | WEIGHT: 125.4 LBS | RESPIRATION RATE: 14 BRPM | DIASTOLIC BLOOD PRESSURE: 70 MMHG | HEIGHT: 61 IN | HEART RATE: 66 BPM | BODY MASS INDEX: 23.68 KG/M2 | SYSTOLIC BLOOD PRESSURE: 122 MMHG

## 2023-03-13 DIAGNOSIS — A09 TRAVELER'S DIARRHEA: ICD-10-CM

## 2023-03-13 DIAGNOSIS — K26.5 DUODENAL ULCER WITH PERFORATION (H): ICD-10-CM

## 2023-03-13 DIAGNOSIS — M81.0 SENILE OSTEOPOROSIS: ICD-10-CM

## 2023-03-13 DIAGNOSIS — E78.5 HYPERLIPIDEMIA, UNSPECIFIED HYPERLIPIDEMIA TYPE: ICD-10-CM

## 2023-03-13 DIAGNOSIS — E03.9 HYPOTHYROIDISM, UNSPECIFIED TYPE: ICD-10-CM

## 2023-03-13 DIAGNOSIS — I47.10 SVT (SUPRAVENTRICULAR TACHYCARDIA) (H): ICD-10-CM

## 2023-03-13 DIAGNOSIS — Z00.00 ENCOUNTER FOR MEDICARE ANNUAL WELLNESS EXAM: Primary | ICD-10-CM

## 2023-03-13 LAB
ALBUMIN SERPL BCG-MCNC: 4.5 G/DL (ref 3.5–5.2)
ALP SERPL-CCNC: 67 U/L (ref 35–104)
ALT SERPL W P-5'-P-CCNC: 10 U/L (ref 10–35)
ANION GAP SERPL CALCULATED.3IONS-SCNC: 13 MMOL/L (ref 7–15)
AST SERPL W P-5'-P-CCNC: 25 U/L (ref 10–35)
BASOPHILS # BLD AUTO: 0.1 10E3/UL (ref 0–0.2)
BASOPHILS NFR BLD AUTO: 1 %
BILIRUB SERPL-MCNC: 0.7 MG/DL
BUN SERPL-MCNC: 18.9 MG/DL (ref 8–23)
CALCIUM SERPL-MCNC: 9.7 MG/DL (ref 8.8–10.2)
CHLORIDE SERPL-SCNC: 103 MMOL/L (ref 98–107)
CHOLEST SERPL-MCNC: 197 MG/DL
CREAT SERPL-MCNC: 0.93 MG/DL (ref 0.51–0.95)
DEPRECATED CALCIDIOL+CALCIFEROL SERPL-MC: 86 UG/L (ref 20–75)
DEPRECATED HCO3 PLAS-SCNC: 24 MMOL/L (ref 22–29)
EOSINOPHIL # BLD AUTO: 0.4 10E3/UL (ref 0–0.7)
EOSINOPHIL NFR BLD AUTO: 6 %
ERYTHROCYTE [DISTWIDTH] IN BLOOD BY AUTOMATED COUNT: 13.2 % (ref 10–15)
GFR SERPL CREATININE-BSD FRML MDRD: 59 ML/MIN/1.73M2
GLUCOSE SERPL-MCNC: 92 MG/DL (ref 70–99)
HCT VFR BLD AUTO: 39.9 % (ref 35–47)
HDLC SERPL-MCNC: 93 MG/DL
HGB BLD-MCNC: 13.3 G/DL (ref 11.7–15.7)
IMM GRANULOCYTES # BLD: 0 10E3/UL
IMM GRANULOCYTES NFR BLD: 0 %
LDLC SERPL CALC-MCNC: 90 MG/DL
LYMPHOCYTES # BLD AUTO: 1.6 10E3/UL (ref 0.8–5.3)
LYMPHOCYTES NFR BLD AUTO: 26 %
MCH RBC QN AUTO: 31.6 PG (ref 26.5–33)
MCHC RBC AUTO-ENTMCNC: 33.3 G/DL (ref 31.5–36.5)
MCV RBC AUTO: 95 FL (ref 78–100)
MONOCYTES # BLD AUTO: 0.4 10E3/UL (ref 0–1.3)
MONOCYTES NFR BLD AUTO: 7 %
NEUTROPHILS # BLD AUTO: 3.6 10E3/UL (ref 1.6–8.3)
NEUTROPHILS NFR BLD AUTO: 60 %
NONHDLC SERPL-MCNC: 104 MG/DL
PLATELET # BLD AUTO: 248 10E3/UL (ref 150–450)
POTASSIUM SERPL-SCNC: 4.4 MMOL/L (ref 3.4–5.3)
PROT SERPL-MCNC: 7.5 G/DL (ref 6.4–8.3)
RBC # BLD AUTO: 4.21 10E6/UL (ref 3.8–5.2)
SODIUM SERPL-SCNC: 140 MMOL/L (ref 136–145)
TRIGL SERPL-MCNC: 71 MG/DL
TSH SERPL DL<=0.005 MIU/L-ACNC: 1.48 UIU/ML (ref 0.3–4.2)
WBC # BLD AUTO: 6 10E3/UL (ref 4–11)

## 2023-03-13 PROCEDURE — 80053 COMPREHEN METABOLIC PANEL: CPT | Performed by: FAMILY MEDICINE

## 2023-03-13 PROCEDURE — 84443 ASSAY THYROID STIM HORMONE: CPT | Performed by: FAMILY MEDICINE

## 2023-03-13 PROCEDURE — 36415 COLL VENOUS BLD VENIPUNCTURE: CPT | Performed by: FAMILY MEDICINE

## 2023-03-13 PROCEDURE — G0438 PPPS, INITIAL VISIT: HCPCS | Performed by: FAMILY MEDICINE

## 2023-03-13 PROCEDURE — 80061 LIPID PANEL: CPT | Performed by: FAMILY MEDICINE

## 2023-03-13 PROCEDURE — 99214 OFFICE O/P EST MOD 30 MIN: CPT | Mod: 25 | Performed by: FAMILY MEDICINE

## 2023-03-13 PROCEDURE — 82306 VITAMIN D 25 HYDROXY: CPT | Performed by: FAMILY MEDICINE

## 2023-03-13 PROCEDURE — 85025 COMPLETE CBC W/AUTO DIFF WBC: CPT | Performed by: FAMILY MEDICINE

## 2023-03-13 RX ORDER — METOPROLOL SUCCINATE 50 MG/1
50 TABLET, EXTENDED RELEASE ORAL AT BEDTIME
Qty: 90 TABLET | Refills: 3 | Status: SHIPPED | OUTPATIENT
Start: 2023-03-13 | End: 2024-03-28

## 2023-03-13 RX ORDER — CIPROFLOXACIN 500 MG/1
500 TABLET, FILM COATED ORAL 2 TIMES DAILY
Qty: 6 TABLET | Refills: 0 | Status: SHIPPED | OUTPATIENT
Start: 2023-03-13 | End: 2023-05-30

## 2023-03-13 RX ORDER — LEVOTHYROXINE SODIUM 25 UG/1
TABLET ORAL
Qty: 90 TABLET | Refills: 3 | Status: SHIPPED | OUTPATIENT
Start: 2023-03-13 | End: 2024-03-28

## 2023-03-13 RX ORDER — SIMVASTATIN 20 MG
TABLET ORAL
Qty: 90 TABLET | Refills: 3 | Status: SHIPPED | OUTPATIENT
Start: 2023-03-13 | End: 2024-03-28

## 2023-03-13 RX ORDER — PANTOPRAZOLE SODIUM 40 MG/1
40 TABLET, DELAYED RELEASE ORAL DAILY
Qty: 90 TABLET | Refills: 3 | Status: SHIPPED | OUTPATIENT
Start: 2023-03-13 | End: 2023-08-11

## 2023-03-13 ASSESSMENT — ACTIVITIES OF DAILY LIVING (ADL): CURRENT_FUNCTION: NO ASSISTANCE NEEDED

## 2023-03-13 ASSESSMENT — ENCOUNTER SYMPTOMS
PALPITATIONS: 0
JOINT SWELLING: 0
CONSTIPATION: 0
BREAST MASS: 0
MYALGIAS: 0
SORE THROAT: 0
HEMATURIA: 0
SHORTNESS OF BREATH: 0
WEAKNESS: 0
PARESTHESIAS: 0
CHILLS: 0
DYSURIA: 0
COUGH: 0
HEARTBURN: 0
ABDOMINAL PAIN: 0
NERVOUS/ANXIOUS: 0
HEADACHES: 0
FREQUENCY: 0
ARTHRALGIAS: 0
EYE PAIN: 0
NAUSEA: 0
DIZZINESS: 0
HEMATOCHEZIA: 0
FEVER: 0
DIARRHEA: 0

## 2023-03-13 NOTE — PROGRESS NOTES
"Answers for HPI/ROS submitted by the patient on 3/13/2023  In general, how would you rate your overall physical health?: good  Do you usually eat at least 4 servings of fruit and vegetables a day, include whole grains & fiber, and avoid regularly eating high fat or \"junk\" foods? : No  Taking medications regularly:: Yes  Medication side effects:: None  Activities of Daily Living: no assistance needed  Home safety: no safety concerns identified  Hearing Impairment:: no hearing concerns  In the past 6 months, have you been bothered by leaking of urine?: No  abdominal pain: No  Blood in stool: No  Blood in urine: No  chest pain: No  chills: No  congestion: No  constipation: No  cough: No  diarrhea: No  dizziness: No  ear pain: No  eye pain: No  nervous/anxious: No  fever: No  frequency: No  genital sores: No  headaches: No  hearing loss: No  heartburn: No  arthralgias: No  joint swelling: No  peripheral edema: No  mood changes: No  myalgias: No  nausea: No  dysuria: No  palpitations: No  Skin sensation changes: No  sore throat: No  urgency: No  rash: No  shortness of breath: No  visual disturbance: No  weakness: No  pelvic pain: No  vaginal bleeding: No  vaginal discharge: No  tenderness: No  breast mass: No  breast discharge: No  In general, how would you rate your overall mental or emotional health?: good  Additional concerns today:: No  Duration of exercise:: 45-60 minutes        The patient was counseled and encouraged to consider modifying their diet and eating habits. She was provided with information on recommended healthy diet options.  "

## 2023-03-13 NOTE — PROGRESS NOTES
ASSESSMENT / PLAN:       ICD-10-CM    1. Encounter for Medicare annual wellness exam  Z00.00 Comprehensive metabolic panel (BMP + Alb, Alk Phos, ALT, AST, Total. Bili, TP)     Comprehensive metabolic panel (BMP + Alb, Alk Phos, ALT, AST, Total. Bili, TP)      2. SVT (supraventricular tachycardia) (H)  I47.1 metoprolol succinate ER (TOPROL XL) 50 MG 24 hr tablet      3. Hyperlipidemia, unspecified hyperlipidemia type  E78.5 simvastatin (ZOCOR) 20 MG tablet     Lipid panel     Lipid panel      4. Duodenal ulcer with perforation (H)  K26.5 pantoprazole (PROTONIX) 40 MG EC tablet     CBC with platelets and differential     CBC with platelets and differential      5. Hypothyroidism, unspecified type  E03.9 levothyroxine (SYNTHROID/LEVOTHROID) 25 MCG tablet     TSH WITH FREE T4 REFLEX     TSH WITH FREE T4 REFLEX      6. Osteoporosis Senile  M81.0 Vitamin D Deficiency     Vitamin D Deficiency      7. Traveler's diarrhea  A09 ciprofloxacin (CIPRO) 500 MG tablet        Medical decision making: Patient presents today for an annual visit  Following issues addressed  1: Hypothyroidism: On levothyroxine.  Check thyroid levels today for med dose adjustment as needed.  2: Duodenal ulcer with perforation: Reviewed visit with GI and endoscopy.  No further endoscopies recommended at her age.  We will check CBC today  3: SVTs: No acute concerns.  Takes metoprolol  4: Hyperlipidemia: On simvastatin 20 mg.  Checks labs today  5: Osteoporosis: Follows with Endo.  On Prolia.  6: Vitamin D check: Noted elevated vitamin D level at last visit.  She does take 5000 IU of vitamin D.  Check vitamin D today.      COUNSELING:  Reviewed preventive health counseling, as reflected in patient instructions       Regular exercise       Healthy diet/nutrition       Vision screening       Dental care       Osteoporosis prevention/bone health        She reports that she has never smoked. She has never used smokeless tobacco.      Appropriate preventive  "services were discussed with this patient, including applicable screening as appropriate for cardiovascular disease, diabetes, osteopenia/osteoporosis, and glaucoma.  As appropriate for age/gender, discussed screening for colorectal cancer, prostate cancer, breast cancer, and cervical cancer. Checklist reviewing preventive services available has been given to the patient.    Reviewed patients plan of care and provided an AVS. The Basic Care Plan (routine screening as documented in Health Maintenance) for Maria D meets the Care Plan requirement. This Care Plan has been established and reviewed with the Patient.    SUBJECTIVE:   Maria D is a 88 year old who presents for Preventive Visit.  Chief Complaint   Patient presents with     Wellness Visit     Pt is fasting today, pt wants yearly refills, pt does have some questions today.        Patient has been advised of split billing requirements and indicates understanding: Yes  Are you in the first 12 months of your Medicare coverage?  No    Healthy Habits:     In general, how would you rate your overall health?  Good    Frequency of exercise:  6-7 days/week    Duration of exercise:  45-60 minutes    Do you usually eat at least 4 servings of fruit and vegetables a day, include whole grains    & fiber and avoid regularly eating high fat or \"junk\" foods?  No    Taking medications regularly:  Yes    Barriers to taking medications:  None    Medication side effects:  None    Ability to successfully perform activities of daily living:  No assistance needed    Home Safety:  No safety concerns identified    Hearing Impairment:  No hearing concerns    In the past 6 months, have you been bothered by leaking of urine?  No    In general, how would you rate your overall mental or emotional health?  Good      PHQ-2 Total Score: 0    Additional concerns today:  No      Have you ever done Advance Care Planning? (For example, a Health Directive, POLST, or a discussion with a medical " provider or your loved ones about your wishes): Yes, patient states has an Advance Care Planning document and will bring a copy to the clinic.       Fall risk  Fallen 2 or more times in the past year?: No  Any fall with injury in the past year?: No    Cognitive Screening   1) Repeat 3 items (Leader, Season, Table)      2) Clock draw:   NORMAL  3) 3 item recall:   Recalls 3 objects  Results: 3 items recalled: COGNITIVE IMPAIRMENT LESS LIKELY    Mini-CogTM Copyright BENJAMIN [place]. Licensed by the author for use in [facility]; reprinted with permission ([email or web address]). All rights reserved.      Do you have sleep apnea, excessive snoring or daytime drowsiness?: no    Reviewed and updated as needed this visit by clinical staff   Tobacco  Allergies  Meds  Problems  Med Hx  Surg Hx  Fam Hx          Reviewed and updated as needed this visit by Provider   Tobacco  Allergies  Meds  Problems  Med Hx  Surg Hx  Fam Hx         Social History     Tobacco Use     Smoking status: Never     Smokeless tobacco: Never   Substance Use Topics     Alcohol use: No         Alcohol Use 3/13/2023   Prescreen: >3 drinks/day or >7 drinks/week? Not Applicable   No flowsheet data found.      Current providers sharing in care for this patient include:   Patient Care Team:  Flex Bain MD as PCP - General  Flex Bain MD as Assigned PCP  Clinton Pavon MD as Assigned Surgical Provider  Radha Contreras MD as Assigned Heart and Vascular Provider    The following health maintenance items are reviewed in Epic and correct as of today:  Health Maintenance   Topic Date Due     TSH W/FREE T4 REFLEX  03/31/2023     DTAP/TDAP/TD IMMUNIZATION (2 - Td or Tdap) 04/15/2023     MEDICARE ANNUAL WELLNESS VISIT  03/13/2024     ANNUAL REVIEW OF HM ORDERS  03/13/2024     FALL RISK ASSESSMENT  03/13/2024     ADVANCE CARE PLANNING  03/13/2028     PHQ-2 (once per calendar year)  Completed     INFLUENZA VACCINE  Completed      Pneumococcal Vaccine: 65+ Years  Completed     ZOSTER IMMUNIZATION  Completed     COVID-19 Vaccine  Completed     IPV IMMUNIZATION  Aged Out     MENINGITIS IMMUNIZATION  Aged Out     Labs reviewed in EPIC  BP Readings from Last 3 Encounters:   03/13/23 122/70   12/13/22 122/78   10/05/22 128/80    Wt Readings from Last 3 Encounters:   03/13/23 56.9 kg (125 lb 6.4 oz)   12/13/22 59 kg (130 lb)   10/05/22 57.2 kg (126 lb)                  Patient Active Problem List   Diagnosis     Fatigue     Temperature Intolerance To Cold   (Consistent)     Abnormal Blood Chemistry     Osteoarthritis Of The Ankle/Foot (Multiple Joints)     Mixed hyperlipidemia     Generalized Osteoarthritis Of Multiple Sites     Edema     Osteoporosis Senile     Vaginal Discharge (Symptom)     Back Pain     Automatic Atrial Tachycardia     SVT (supraventricular tachycardia) (H)     Hypotension, unspecified hypotension type     Actinic keratosis     Hair thinning     Free intraperitoneal air     Duodenal ulcer with perforation (H)     Hiatal hernia     S/P exploratory laparotomy     Perforated duodenal ulcer (H)     Hypothyroidism     Furuncle     Lightheadedness     Personal history of other drug therapy     Past Surgical History:   Procedure Laterality Date     EP ABLATION SVT Right 6/26/2018    Procedure: EP Ablation Supra Ventricular;  Surgeon: Dany Renae MD;  Location: [facility];  Service:      ESOPHAGOSCOPY, GASTROSCOPY, DUODENOSCOPY (EGD), COMBINED N/A 4/4/2022    Procedure: ESOPHAGOGASTRODUODENOSCOPY with esophageal biopsy;  Surgeon: Ashlyn Acuna MD;  Location: [facility] GI     GASTROSTOMY, INSERT TUBE, COMBINED N/A 2/9/2021    Procedure: PLACEMENT OF GASTROSTOMY TUBE,;  Surgeon: Clinton Pavon MD;  Location: [facility] OR;  Service: General     HC REMOVAL ADENOIDS,PRIMARY,<13 Y/O      Description: Adenoidectomy;  Recorded: 12/31/2007;     HC REMOVAL OF TONSILS,<13 Y/O      Description: Tonsillectomy;  Recorded:  12/31/2007;     HYSTERECTOMY       LAPAROTOMY EXPLORATORY N/A 2/9/2021    Procedure: EXPLORATORY LAPAROTOMY,  REPAIR GRAHM PATCH OF DUODENAL ULCER;  Surgeon: Clinton Pavon MD;  Location: [facility];  Service: General     OOPHORECTOMY       [facility] APPENDECTOMY      Description: Appendectomy;  Recorded: 12/31/2007;     [facility] TOTAL KNEE ARTHROPLASTY      Description: Total Knee Arthroplasty;  Recorded: 12/31/2007;     [facility] VAG HYST,RMV TUBE/OVARY,FIX ENTEROCE      Description: Vag Hysterect Uterus <250g Remov Both Ovaries Rep Enterocele;  Recorded: 12/31/2007;       Social History     Tobacco Use     Smoking status: Never     Smokeless tobacco: Never   Substance Use Topics     Alcohol use: No     Family History   Problem Relation Age of Onset     Cerebrovascular Disease Father          Current Outpatient Medications   Medication Sig Dispense Refill     calcium carbonate (OS-DOMONIQUE) 600 mg (1,500 mg) tablet [CALCIUM CARBONATE (OS-DOMONIQUE) 600 MG (1,500 MG) TABLET] Take 600 mg by mouth every other day.              cholecalciferol, vitamin D3, 5,000 unit Tab Take 125 mcg by mouth daily        ciprofloxacin (CIPRO) 500 MG tablet Take 1 tablet (500 mg) by mouth 2 times daily 6 tablet 0     levothyroxine (SYNTHROID/LEVOTHROID) 25 MCG tablet [LEVOTHYROXINE (SYNTHROID, LEVOTHROID) 25 MCG TABLET] TAKE 1 TABLET DAILY AT 6:00 A.M. 90 tablet 3     metoprolol succinate ER (TOPROL XL) 50 MG 24 hr tablet Take 1 tablet (50 mg) by mouth At Bedtime 90 tablet 3     OMEGA-3/DHA/EPA/FISH OIL (FISH OIL-OMEGA-3 FATTY ACIDS) 300-1,000 mg capsule [OMEGA-3/DHA/EPA/FISH OIL (FISH OIL-OMEGA-3 FATTY ACIDS) 300-1,000 MG CAPSULE] Take 2,000 mg by mouth daily.        pantoprazole (PROTONIX) 40 MG EC tablet Take 1 tablet (40 mg) by mouth daily 90 tablet 3     simvastatin (ZOCOR) 20 MG tablet [SIMVASTATIN (ZOCOR) 20 MG TABLET] TAKE 1 TABLET DAILY AT BEDTIME 90 tablet 3     VIT C/E/ZN/COPPR/LUTEIN/ZEAXAN (PRESERVISION AREDS 2 ORAL) [VIT  "C/E/ZN/COPPR/LUTEIN/ZEAXAN (PRESERVISION AREDS 2 ORAL)] Take 1 tablet by mouth 2 (two) times a day.           Mammogram Screening - Patient over age 75, has elected to continue with screening.  Pertinent mammograms are reviewed under the imaging tab.    Review of Systems   Constitutional: Negative for chills and fever.   HENT: Negative for congestion, ear pain, hearing loss and sore throat.    Eyes: Negative for pain and visual disturbance.   Respiratory: Negative for cough and shortness of breath.    Cardiovascular: Negative for chest pain, palpitations and peripheral edema.   Gastrointestinal: Negative for abdominal pain, constipation, diarrhea, heartburn, hematochezia and nausea.   Breasts:  Negative for tenderness, breast mass and discharge.   Genitourinary: Negative for dysuria, frequency, genital sores, hematuria, pelvic pain, urgency, vaginal bleeding and vaginal discharge.   Musculoskeletal: Negative for arthralgias, joint swelling and myalgias.   Skin: Negative for rash.   Neurological: Negative for dizziness, weakness, headaches and paresthesias.   Psychiatric/Behavioral: Negative for mood changes. The patient is not nervous/anxious.          OBJECTIVE:   /70 (BP Location: Left arm, Patient Position: Sitting, Cuff Size: Adult Regular)   Pulse 66   Resp 14   Ht 1.549 m (5' 1\")   Wt 56.9 kg (125 lb 6.4 oz)   SpO2 97%   BMI 23.69 kg/m   Estimated body mass index is 23.69 kg/m  as calculated from the following:    Height as of this encounter: 1.549 m (5' 1\").    Weight as of this encounter: 56.9 kg (125 lb 6.4 oz).  Physical Exam  GENERAL: healthy, alert and no distress  EYES: Eyes grossly normal to inspection, PERRL and conjunctivae and sclerae normal  HENT: ear canals and TM's normal, nose and mouth without ulcers or lesions  NECK: no adenopathy, no asymmetry, masses, or scars and thyroid normal to palpation  RESP: lungs clear to auscultation - no rales, rhonchi or wheezes  CV: regular rate and " rhythm, normal S1 S2, no S3 or S4, no murmur, click or rub, no peripheral edema and peripheral pulses strong  ABDOMEN: soft, nontender, no hepatosplenomegaly, no masses and bowel sounds normal  MS: no gross musculoskeletal defects noted, no edema    Diagnostic Test Results:  Labs reviewed in Epic  Results for orders placed or performed in visit on 03/13/23 (from the past 24 hour(s))   CBC with platelets and differential    Narrative    The following orders were created for panel order CBC with platelets and differential.  Procedure                               Abnormality         Status                     ---------                               -----------         ------                     CBC with platelets and d...[[id number]]                                                   Please view results for these tests on the individual orders.         Flex Bain MD  [facility]    Identified Health Risks:

## 2023-03-13 NOTE — PATIENT INSTRUCTIONS
Patient Education   Personalized Prevention Plan  You are due for the preventive services outlined below.  Your care team is available to assist you in scheduling these services.  If you have already completed any of these items, please share that information with your care team to update in your medical record.  Health Maintenance Due   Topic Date Due    Thyroid Function Lab  03/31/2023    ANNUAL REVIEW OF HM ORDERS  03/31/2023       Understanding USDA MyPlate  The USDA has guidelines to help you make healthy food choices. These are called MyPlate. MyPlate shows the food groups that make up healthy meals using the image of a place setting. Before you eat, think about the healthiest choices for what to put on your plate or in your cup or bowl. To learn more about building a healthy plate, visit www.choosemyplate.gov.    The food groups  Fruits. Any fruit or 100% fruit juice counts as part of the Fruit Group. Fruits may be fresh, canned, frozen, or dried, and may be whole, cut-up, or pureed. Make 1/2 of your plate fruits and vegetables.  Vegetables. Any vegetable or 100% vegetable juice counts as a member of the Vegetable Group. Vegetables may be fresh, frozen, canned, or dried. They can be served raw or cooked and may be whole, cut-up, or mashed. Make 1/2 of your plate fruits and vegetables.  Grains. All foods made from grains are part of the Grains Group. These include wheat, rice, oats, cornmeal, and barley. Grains are often used to make foods such as bread, pasta, oatmeal, cereal, tortillas, and grits. Grains should be no more than 1/4 of your plate. At least half of your grains should be whole grains.  Protein. This group includes meat, poultry, seafood, beans and peas, eggs, processed soy products (such as tofu), nuts (including nut butters), and seeds. Make protein choices no more than 1/4 of your plate. Meat and poultry choices should be lean or low fat.  Dairy. The Dairy Group includes all fluid milk  products and foods made from milk that contain calcium, such as yogurt and cheese. (Foods that have little calcium, such as cream, butter, and cream cheese, are not part of this group.) Most dairy choices should be low-fat or fat-free.  Oils. Oils aren't a food group, but they do contain essential nutrients. However it's important to watch your intake of oils. These are fats that are liquid at room temperature. They include canola, corn, olive, soybean, vegetable, and sunflower oil. Foods that are mainly oil include mayonnaise, certain salad dressings, and soft margarines. You likely already get your daily oil allowance from the foods you eat.  Things to limit  Eating healthy also means limiting these things in your diet:     Salt (sodium). Many processed foods have a lot of sodium. To keep sodium intake down, eat fresh vegetables, meats, poultry, and seafood when possible. Purchase low-sodium, reduced-sodium, or no-salt-added food products at the store. And don't add salt to your meals at home. Instead, season them with herbs and spices such as dill, oregano, cumin, and paprika. Or try adding flavor with lemon or lime zest and juice.  Saturated fat. Saturated fats are most often found in animal products such as beef, pork, and chicken. They are often solid at room temperature, such as butter. To reduce your saturated fat intake, choose leaner cuts of meat and poultry. And try healthier cooking methods such as grilling, broiling, roasting, or baking. For a simple lower-fat swap, use plain nonfat yogurt instead of mayonnaise when making potato salad or macaroni salad.  Added sugars. These are sugars added to foods. They are in foods such as ice cream, candy, soda, fruit drinks, sports drinks, energy drinks, cookies, pastries, jams, and syrups. Cut down on added sugars by sharing sweet treats with a family member or friend. You can also choose fruit for dessert, and drink water or other unsweetened beverages.      [employer] last reviewed this educational content on 6/1/2020 2000-2021 [employer]. All rights reserved. This information is not intended as a substitute for professional medical care. Always follow your healthcare professional's instructions.      Ciprofloxacin (Cipro) 500 mg twice daily for one to three days Other quinolones (e.g., ofloxacin [Floxin], norfloxacin [Noroxin], and levofloxacin [Levaquin]) are presumed to be effective as well.

## 2023-03-14 ENCOUNTER — TELEPHONE (OUTPATIENT)
Dept: FAMILY MEDICINE | Facility: CLINIC | Age: 88
End: 2023-03-14
Payer: COMMERCIAL

## 2023-03-14 NOTE — TELEPHONE ENCOUNTER
Incoming call from patient stating she has a full bottle of Vitamin D 5,000 units. She noticed her Vitamin D level was high. She would like to use the 5,000 mcg dosage up before buying a lower dose.  Wondering what frequency Dr. Bain recommends she take the 5,000 units? Every other day? Weekly?    Component      Latest Ref Rng & Units 3/13/2023   Vitamin D Deficiency screening      20 - 75 ug/L 86 (H)

## 2023-03-14 NOTE — TELEPHONE ENCOUNTER
-Vitamin D is high.  I would advise 1000 international unit of vitamin D daily.  Currently she has the 5000 IU pills and she can use 1 every 5 days.    Rest of the results are released with message      Flex Bain MD

## 2023-04-19 ENCOUNTER — OFFICE VISIT (OUTPATIENT)
Dept: FAMILY MEDICINE | Facility: CLINIC | Age: 88
End: 2023-04-19
Payer: COMMERCIAL

## 2023-04-19 VITALS
OXYGEN SATURATION: 98 % | SYSTOLIC BLOOD PRESSURE: 116 MMHG | TEMPERATURE: 97.8 F | HEART RATE: 62 BPM | DIASTOLIC BLOOD PRESSURE: 78 MMHG | RESPIRATION RATE: 20 BRPM | WEIGHT: 125.4 LBS | BODY MASS INDEX: 23.69 KG/M2

## 2023-04-19 DIAGNOSIS — A09 TRAVELER'S DIARRHEA: ICD-10-CM

## 2023-04-19 DIAGNOSIS — L03.011 PARONYCHIA OF FINGER, RIGHT: Primary | ICD-10-CM

## 2023-04-19 PROCEDURE — 99214 OFFICE O/P EST MOD 30 MIN: CPT | Performed by: PHYSICIAN ASSISTANT

## 2023-04-19 RX ORDER — DOXYCYCLINE 100 MG/1
100 CAPSULE ORAL 2 TIMES DAILY
Qty: 14 CAPSULE | Refills: 0 | Status: SHIPPED | OUTPATIENT
Start: 2023-04-19 | End: 2023-04-26

## 2023-04-19 RX ORDER — CIPROFLOXACIN 500 MG/1
500 TABLET, FILM COATED ORAL 2 TIMES DAILY
Qty: 6 TABLET | Refills: 0 | Status: SHIPPED | OUTPATIENT
Start: 2023-04-19 | End: 2023-04-22

## 2023-04-19 NOTE — PROGRESS NOTES
"URGENT CARE VISIT:    SUBJECTIVE:   Chief Complaint   Patient presents with     Derm Problem     Rt middle finger x 1 month. \"Nail coming off', yellow drainage, soreness at cuticle, tenderness. Possibly new nail growing in per pt.      Maria D Moore is a 88 year old female who presents with a chief complaint of right middle finger pain, swelling and redness.  Symptoms began 1 month(s) ago, are mild and gradual onset  Started off as injury to nail now saw some drainage.  Pain exacerbated by touch. Relieved by rest.  She treated it initially with no therapy. This is the first time this type of injury has occurred to this patient.     She is also here for travel consult. She is going to [place] and is wondering if she can get travelers diarrhea medicine prior to going.     PMH:   Past Medical History:   Diagnosis Date     Back pain      Furuncle      Hyperlipidemia      Macular degeneration      Macular degeneration      Osteoarthritis      Osteoporosis      SVT (supraventricular tachycardia) (H)      SVT (supraventricular tachycardia) (H)      Allergies: Cephalexin, Erythromycin base [erythromycin], Penicillins, Sulfa (sulfonamide antibiotics) [sulfa drugs], and Vancomycin   Medications:   Current Outpatient Medications   Medication Sig Dispense Refill     calcium carbonate (OS-DOMONIQUE) 600 mg (1,500 mg) tablet [CALCIUM CARBONATE (OS-DOMONIQUE) 600 MG (1,500 MG) TABLET] Take 600 mg by mouth every other day.              cholecalciferol, vitamin D3, 5,000 unit Tab Take 125 mcg by mouth daily        ciprofloxacin (CIPRO) 500 MG tablet Take 1 tablet (500 mg) by mouth 2 times daily for 3 days 6 tablet 0     ciprofloxacin (CIPRO) 500 MG tablet Take 1 tablet (500 mg) by mouth 2 times daily 6 tablet 0     doxycycline hyclate (VIBRAMYCIN) 100 MG capsule Take 1 capsule (100 mg) by mouth 2 times daily for 7 days 14 capsule 0     levothyroxine (SYNTHROID/LEVOTHROID) 25 MCG tablet [LEVOTHYROXINE (SYNTHROID, LEVOTHROID) 25 MCG " TABLET] TAKE 1 TABLET DAILY AT 6:00 A.M. 90 tablet 3     metoprolol succinate ER (TOPROL XL) 50 MG 24 hr tablet Take 1 tablet (50 mg) by mouth At Bedtime 90 tablet 3     OMEGA-3/DHA/EPA/FISH OIL (FISH OIL-OMEGA-3 FATTY ACIDS) 300-1,000 mg capsule [OMEGA-3/DHA/EPA/FISH OIL (FISH OIL-OMEGA-3 FATTY ACIDS) 300-1,000 MG CAPSULE] Take 2,000 mg by mouth daily.        pantoprazole (PROTONIX) 40 MG EC tablet Take 1 tablet (40 mg) by mouth daily 90 tablet 3     simvastatin (ZOCOR) 20 MG tablet [SIMVASTATIN (ZOCOR) 20 MG TABLET] TAKE 1 TABLET DAILY AT BEDTIME 90 tablet 3     VIT C/E/ZN/COPPR/LUTEIN/ZEAXAN (PRESERVISION AREDS 2 ORAL) [VIT C/E/ZN/COPPR/LUTEIN/ZEAXAN (PRESERVISION AREDS 2 ORAL)] Take 1 tablet by mouth 2 (two) times a day.       Social History:   Social History     Tobacco Use     Smoking status: Never     Passive exposure: Never     Smokeless tobacco: Never   Vaping Use     Vaping status: Not on file   Substance Use Topics     Alcohol use: No       ROS:  Review of systems negative except as stated above.    OBJECTIVE:  /78 (BP Location: Left arm, Patient Position: Sitting, Cuff Size: Adult Regular)   Pulse 62   Temp 97.8  F (36.6  C) (Oral)   Resp 20   Wt 56.9 kg (125 lb 6.4 oz)   SpO2 98%   BMI 23.69 kg/m    GENERAL APPEARANCE: healthy, alert and no distress  MUSCULOSKELETAL: moderate TTP over right middle finger. FROM.   EXTREMITIES: peripheral pulses normal  SKIN: mild erythema and edema over proximal nail fold of right middle finger. Mild drainage. New nail is growing in and pushing old one out.   NEURO: sensation intact       ASSESSMENT:    ICD-10-CM    1. Paronychia of finger, right  L03.011 doxycycline hyclate (VIBRAMYCIN) 100 MG capsule      2. Traveler's diarrhea  A09 ciprofloxacin (CIPRO) 500 MG tablet          PLAN:  30 minutes spent by me on the date of the encounter doing chart review, review of outside records, review of test results, interpretation of tests, patient visit and  documentation   Patient Instructions   Patient was educated on the natural course of infection. Conservative measures discussed including warm compresses, epson salt soaks for 5-10 minutes twice daily, and over-the-counter analgesics (Tylenol or Ibuprofen). Observe carefully for any signs of increased redness or pain in the affected area. See your primary care provider if symptoms worsen or do not improve in 3 days. Seek emergency care if you develop severe pain, rapidly spreading redness, streaking, or fever.     Patient verbalized understanding and is agreeable to plan. The patient was discharged ambulatory and in stable condition.    Tonia Garcia PA-C on 4/19/2023 at 12:12 PM

## 2023-04-19 NOTE — PATIENT INSTRUCTIONS
Patient was educated on the natural course of infection. Conservative measures discussed including warm compresses, epson salt soaks for 5-10 minutes twice daily, and over-the-counter analgesics (Tylenol or Ibuprofen). Observe carefully for any signs of increased redness or pain in the affected area. See your primary care provider if symptoms worsen or do not improve in 3 days. Seek emergency care if you develop severe pain, rapidly spreading redness, streaking, or fever.

## 2023-05-04 ENCOUNTER — OFFICE VISIT (OUTPATIENT)
Dept: CARDIOLOGY | Facility: CLINIC | Age: 88
End: 2023-05-04
Attending: INTERNAL MEDICINE
Payer: COMMERCIAL

## 2023-05-04 VITALS
OXYGEN SATURATION: 98 % | HEART RATE: 63 BPM | BODY MASS INDEX: 23.62 KG/M2 | SYSTOLIC BLOOD PRESSURE: 104 MMHG | RESPIRATION RATE: 12 BRPM | DIASTOLIC BLOOD PRESSURE: 69 MMHG | WEIGHT: 125 LBS

## 2023-05-04 DIAGNOSIS — I47.10 SVT (SUPRAVENTRICULAR TACHYCARDIA) (H): Primary | ICD-10-CM

## 2023-05-04 DIAGNOSIS — I34.0 NONRHEUMATIC MITRAL VALVE REGURGITATION: ICD-10-CM

## 2023-05-04 PROCEDURE — 99214 OFFICE O/P EST MOD 30 MIN: CPT | Performed by: INTERNAL MEDICINE

## 2023-05-04 NOTE — LETTER
5/4/2023    Flex Bain MD  [address]  [facility] [address]    RE: Maria D Moore       Dear Colleague,     I had the pleasure of seeing Maria D Moore in the [facility].         [facility]   [address], [place] [address]   [email or web address]   OFFICE: [phone]          Thank you Flex Lott for asking the [facility] team to participate in the care of your patient, Maria D Moore.     Impression and Plan     1. Supraventricular tachycardia. Maria D underwent successful ablation of slow AV node pathway with cryoablation 26 June 2018.  This denies any subjective recurrence of SVT since my last visit with her.  Overall, she is very pleased with how she is performing from a cardiac standpoint.     2.  Mitral insufficiency.  This was felt moderate in degree on last echocardiogram on 1 June 2018.  More recent echocardiogram 28 April 2022 suggested only mild-moderate mitral insufficiency (see Cardiac Diagnostics section below).  Significant progression is not suggested on exam or clinically     Plan on follow-up in one year.       35 minutes spent reviewing prior records (including documentation, laboratory studies, cardiac testing/imaging), interview with patient along with physical exam, planning, and subsequent documentation/crafting of note).           History of Present Illness    Once again I would like to thank you again for asking me to participate in the care of your patient, Maria D Moore.  As you know, but to reiterate for my own records, Maria D Moore is a 88 year old female with history of SVT.  Maria D underwent successful ablation of slow AV node pathway with cryoablation 26 June 2018.     In follow-up today, Maria D denies any recurrent subjective SVT since my last visit with her.  She denies any chest pain.  Her breathing has been comfortable.  She does feel  "somewhat \"tired\".  Denies lightheadedness.    Further review of systems is otherwise negative/noncontributory (medical record and 13 point review of systems reviewed as well and pertinent positives noted).         Cardiac Diagnostics      Echocardiogram 28 April 2022:  Normal left ventricular size and systolic performance with ejection fraction of 60 to 65%.  Trace aortic insufficiency.  Mild-moderate mitral insufficiency.  Moderate tricuspid insufficiency.  Normal right ventricular size and systolic performance.  Normal atrial dimensions.    Echocardiogram 1 June 2018 (personally reviewed):  Normal left ventricular size and systolic performance with a visually estimated ejection fraction of 65%.   There is moderate mitral insufficiency.   There is moderate tricuspid insufficiency.   Normal right ventricular size and systolic performance.   There is moderate biatrial enlargement.  When compared to the prior real-time echocardiogram dated 14 August 2010, the degree of mitral insufficiency has increased from mild to now moderate.    Echocardiogram 14 August 2010:  Normal left ventricular size and systolic performance with ejection fraction of 55%.  Moderate tricuspid insufficiency.  Mild left atrial enlargement.    Nuclear perfusion imaging study 1 August 2018:  Small reversible defect in the anterior/apical segments.  Significant splanchnic artifact reducing sensitivity of finding.  Normal left ventricular systolic performance with ejection fraction of 81%.    Event recorder 6 April 2016 through 2 May 2016:  Largely normal 30-day event monitor.  Symptoms correlating with sinus rhythm and occasional atrial ectopy.    Holter monitor 2 March 2015:  Moderate amount of atrial ectopy is present but most of the PACs are singular or short runs at modest rates.   No atrial fibrillation identified.             Physical Examination       /69 (BP Location: Left arm, Patient Position: Sitting, Cuff Size: Adult Regular)   " Pulse 63   Resp 12   Wt 56.7 kg (125 lb)   SpO2 98%   BMI 23.62 kg/m          Wt Readings from Last 3 Encounters:   05/04/23 56.7 kg (125 lb)   04/19/23 56.9 kg (125 lb 6.4 oz)   03/13/23 56.9 kg (125 lb 6.4 oz)       The patient is alert and oriented times three. Sclerae are anicteric. Mucosal membranes are moist. Jugular venous pressure is normal. No significant adenopathy/thyromegally appreciated. Lungs are clear with good expansion. On cardiovascular exam, the patient has a regular S1 and S2. Abdomen is soft and non-tender. Extremities reveal no clubbing, cyanosis, or edema.       Medications  Allergies   Current Outpatient Medications   Medication Sig Dispense Refill    calcium carbonate (OS-DOMONIQUE) 600 mg (1,500 mg) tablet [CALCIUM CARBONATE (OS-DOMONIQUE) 600 MG (1,500 MG) TABLET] Take 600 mg by mouth every other day.             Cholecalciferol (VITAMIN D3) 25 MCG (1000 UT) CAPS Take 1 capsule by mouth daily      cholecalciferol, vitamin D3, 5,000 unit Tab Take 125 mcg by mouth daily Takes every 5 days      levothyroxine (SYNTHROID/LEVOTHROID) 25 MCG tablet [LEVOTHYROXINE (SYNTHROID, LEVOTHROID) 25 MCG TABLET] TAKE 1 TABLET DAILY AT 6:00 A.M. 90 tablet 3    metoprolol succinate ER (TOPROL XL) 50 MG 24 hr tablet Take 1 tablet (50 mg) by mouth At Bedtime 90 tablet 3    OMEGA-3/DHA/EPA/FISH OIL (FISH OIL-OMEGA-3 FATTY ACIDS) 300-1,000 mg capsule [OMEGA-3/DHA/EPA/FISH OIL (FISH OIL-OMEGA-3 FATTY ACIDS) 300-1,000 MG CAPSULE] Take 2,000 mg by mouth daily.       pantoprazole (PROTONIX) 40 MG EC tablet Take 1 tablet (40 mg) by mouth daily (Patient taking differently: Take 40 mg by mouth 2 times daily) 90 tablet 3    simvastatin (ZOCOR) 20 MG tablet [SIMVASTATIN (ZOCOR) 20 MG TABLET] TAKE 1 TABLET DAILY AT BEDTIME 90 tablet 3    VIT C/E/ZN/COPPR/LUTEIN/ZEAXAN (PRESERVISION AREDS 2 ORAL) [VIT C/E/ZN/COPPR/LUTEIN/ZEAXAN (PRESERVISION AREDS 2 ORAL)] Take 1 tablet by mouth 2 (two) times a day.      ciprofloxacin (CIPRO) 500  MG tablet Take 1 tablet (500 mg) by mouth 2 times daily (Patient not taking: Reported on 5/4/2023) 6 tablet 0       Allergies   Allergen Reactions    Cephalexin Shortness Of Breath    Erythromycin Base [Erythromycin] Rash    Penicillins Unknown    Sulfa (Sulfonamide Antibiotics) [Sulfa Antibiotics] Rash    Vancomycin Itching          Lab Results    Chemistry/lipid CBC Cardiac Enzymes/BNP/TSH/INR   Recent Labs   Lab Test 03/13/23  1023   CHOL 197   HDL 93   LDL 90   TRIG 71     Recent Labs   Lab Test 03/13/23  1023 03/31/22  0802 04/08/21  1036   LDL 90 89 99     Recent Labs   Lab Test 03/13/23  1023      POTASSIUM 4.4   CHLORIDE 103   CO2 24   GLC 92   BUN 18.9   CR 0.93   GFRESTIMATED 59*   DOMONIQUE 9.7     Recent Labs   Lab Test 03/13/23  1023 03/31/22  0802 04/08/21  1036   CR 0.93 0.91 0.86     No results for input(s): A1C in the last 40521 hours.       Recent Labs   Lab Test 03/13/23  1023   WBC 6.0   HGB 13.3   HCT 39.9   MCV 95        Recent Labs   Lab Test 03/13/23  1023 03/31/22  0802 04/08/21  1036   HGB 13.3 13.6 12.9    Recent Labs   Lab Test 06/01/18  0706 05/31/18  2305 05/31/18  1727   TROPONINI 0.31* 0.41* 0.28     Recent Labs   Lab Test 04/08/21  1036 02/10/21  0438   * 518*     Recent Labs   Lab Test 03/13/23  1023   TSH 1.48     Recent Labs   Lab Test 06/01/18  0011 05/31/18  1225   INR 1.16* 1.12*        Medical History  Surgical History Family History Social History   Past Medical History:   Diagnosis Date    Back pain     Furuncle     Hyperlipidemia     Macular degeneration     Macular degeneration     Osteoarthritis     Osteoporosis     SVT (supraventricular tachycardia) (H)     SVT (supraventricular tachycardia) (H)      Past Surgical History:   Procedure Laterality Date    EP ABLATION SVT Right 6/26/2018    Procedure: EP Ablation Supra Ventricular;  Surgeon: Dany Renae MD;  Location: [facility];  Service:     ESOPHAGOSCOPY, GASTROSCOPY, DUODENOSCOPY (EGD),  COMBINED N/A 4/4/2022    Procedure: ESOPHAGOGASTRODUODENOSCOPY with esophageal biopsy;  Surgeon: Ashlyn Acuna MD;  Location: [facility] GI    GASTROSTOMY, INSERT TUBE, COMBINED N/A 2/9/2021    Procedure: PLACEMENT OF GASTROSTOMY TUBE,;  Surgeon: Clinton Pavon MD;  [place] [facility]  Service: General    HC REMOVAL ADENOIDS,PRIMARY,<13 Y/O      Description: Adenoidectomy;  Recorded: 12/31/2007;    HC REMOVAL OF TONSILS,<13 Y/O      Description: Tonsillectomy;  Recorded: 12/31/2007;    HYSTERECTOMY      LAPAROTOMY EXPLORATORY N/A 2/9/2021    Procedure: EXPLORATORY LAPAROTOMY,  REPAIR GRAHM PATCH OF DUODENAL ULCER;  Surgeon: Clinton Pavon MD;  [place] [facility]  Service: General    OOPHORECTOMY      ZZC APPENDECTOMY      Description: Appendectomy;  Recorded: 12/31/2007;    ZZC TOTAL KNEE ARTHROPLASTY      Description: Total Knee Arthroplasty;  Recorded: 12/31/2007;    ZZC VAG HYST,RMV TUBE/OVARY,FIX ENTEROCE      Description: Vag Hysterect Uterus <250g Remov Both Ovaries Rep Enterocele;  Recorded: 12/31/2007;     Family History   Problem Relation Age of Onset    Cerebrovascular Disease Father         Social History     Socioeconomic History    Marital status:      Spouse name: Not on file    Number of children: Not on file    Years of education: Not on file    Highest education level: Not on file   Occupational History    Not on file   Tobacco Use    Smoking status: Never     Passive exposure: Never    Smokeless tobacco: Never   Vaping Use    Vaping status: Not on file   Substance and Sexual Activity    Alcohol use: No    Drug use: No    Sexual activity: Not on file   Other Topics Concern    Parent/sibling w/ CABG, MI or angioplasty before 65F 55M? 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           Social Determinants of Health     Financial Resource Strain: Not on file   Food Insecurity: Not on file   Transportation Needs: Not on  file   Physical Activity: Not on file   Stress: Not on file   Social Connections: Not on file   Intimate Partner Violence: Not on file   Housing Stability: Not on file                      Thank you for allowing me to participate in the care of your patient.      Sincerely,     Radha Contreras MD   [facility]  cc:   Radha Contreras MD  [address]  [place] [address]

## 2023-05-04 NOTE — PROGRESS NOTES
"       [facility]   [address], [place] [address]   [email or web address]   OFFICE: [phone]          Thank you Flex Lott for asking the [facility] team to participate in the care of your patient, Maria D Moore.     Impression and Plan     1. Supraventricular tachycardia. Maria D underwent successful ablation of slow AV node pathway with cryoablation 26 June 2018.  This denies any subjective recurrence of SVT since my last visit with her.  Overall, she is very pleased with how she is performing from a cardiac standpoint.     2.  Mitral insufficiency.  This was felt moderate in degree on last echocardiogram on 1 June 2018.  More recent echocardiogram 28 April 2022 suggested only mild-moderate mitral insufficiency (see Cardiac Diagnostics section below).  Significant progression is not suggested on exam or clinically     Plan on follow-up in one year.       35 minutes spent reviewing prior records (including documentation, laboratory studies, cardiac testing/imaging), interview with patient along with physical exam, planning, and subsequent documentation/crafting of note).           History of Present Illness    Once again I would like to thank you again for asking me to participate in the care of your patient, Maria D Moore.  As you know, but to reiterate for my own records, Maria D Moore is a 88 year old female with history of SVT.  Maria D underwent successful ablation of slow AV node pathway with cryoablation 26 June 2018.     In follow-up today, Maria D denies any recurrent subjective SVT since my last visit with her.  She denies any chest pain.  Her breathing has been comfortable.  She does feel somewhat \"tired\".  Denies lightheadedness.    Further review of systems is otherwise negative/noncontributory (medical record and 13 point review of systems reviewed as well and pertinent positives noted).         Cardiac " Diagnostics      Echocardiogram 28 April 2022:  1. Normal left ventricular size and systolic performance with ejection fraction of 60 to 65%.  2. Trace aortic insufficiency.  3. Mild-moderate mitral insufficiency.  4. Moderate tricuspid insufficiency.  5. Normal right ventricular size and systolic performance.  6. Normal atrial dimensions.    Echocardiogram 1 June 2018 (personally reviewed):  1. Normal left ventricular size and systolic performance with a visually estimated ejection fraction of 65%.   2. There is moderate mitral insufficiency.   3. There is moderate tricuspid insufficiency.   4. Normal right ventricular size and systolic performance.   5. There is moderate biatrial enlargement.    When compared to the prior real-time echocardiogram dated 14 August 2010, the degree of mitral insufficiency has increased from mild to now moderate.    Echocardiogram 14 August 2010:  1. Normal left ventricular size and systolic performance with ejection fraction of 55%.  2. Moderate tricuspid insufficiency.  3. Mild left atrial enlargement.    Nuclear perfusion imaging study 1 August 2018:  1. Small reversible defect in the anterior/apical segments.  Significant splanchnic artifact reducing sensitivity of finding.  2. Normal left ventricular systolic performance with ejection fraction of 81%.    Event recorder 6 April 2016 through 2 May 2016:  1. Largely normal 30-day event monitor.  2. Symptoms correlating with sinus rhythm and occasional atrial ectopy.    Holter monitor 2 March 2015:  1. Moderate amount of atrial ectopy is present but most of the PACs are singular or short runs at modest rates.   2. No atrial fibrillation identified.             Physical Examination       /69 (BP Location: Left arm, Patient Position: Sitting, Cuff Size: Adult Regular)   Pulse 63   Resp 12   Wt 56.7 kg (125 lb)   SpO2 98%   BMI 23.62 kg/m          Wt Readings from Last 3 Encounters:   05/04/23 56.7 kg (125 lb)   04/19/23 56.9  kg (125 lb 6.4 oz)   03/13/23 56.9 kg (125 lb 6.4 oz)       The patient is alert and oriented times three. Sclerae are anicteric. Mucosal membranes are moist. Jugular venous pressure is normal. No significant adenopathy/thyromegally appreciated. Lungs are clear with good expansion. On cardiovascular exam, the patient has a regular S1 and S2. Abdomen is soft and non-tender. Extremities reveal no clubbing, cyanosis, or edema.       Medications  Allergies   Current Outpatient Medications   Medication Sig Dispense Refill     calcium carbonate (OS-DOMONIQUE) 600 mg (1,500 mg) tablet [CALCIUM CARBONATE (OS-DOMONIQUE) 600 MG (1,500 MG) TABLET] Take 600 mg by mouth every other day.              Cholecalciferol (VITAMIN D3) 25 MCG (1000 UT) CAPS Take 1 capsule by mouth daily       cholecalciferol, vitamin D3, 5,000 unit Tab Take 125 mcg by mouth daily Takes every 5 days       levothyroxine (SYNTHROID/LEVOTHROID) 25 MCG tablet [LEVOTHYROXINE (SYNTHROID, LEVOTHROID) 25 MCG TABLET] TAKE 1 TABLET DAILY AT 6:00 A.M. 90 tablet 3     metoprolol succinate ER (TOPROL XL) 50 MG 24 hr tablet Take 1 tablet (50 mg) by mouth At Bedtime 90 tablet 3     OMEGA-3/DHA/EPA/FISH OIL (FISH OIL-OMEGA-3 FATTY ACIDS) 300-1,000 mg capsule [OMEGA-3/DHA/EPA/FISH OIL (FISH OIL-OMEGA-3 FATTY ACIDS) 300-1,000 MG CAPSULE] Take 2,000 mg by mouth daily.        pantoprazole (PROTONIX) 40 MG EC tablet Take 1 tablet (40 mg) by mouth daily (Patient taking differently: Take 40 mg by mouth 2 times daily) 90 tablet 3     simvastatin (ZOCOR) 20 MG tablet [SIMVASTATIN (ZOCOR) 20 MG TABLET] TAKE 1 TABLET DAILY AT BEDTIME 90 tablet 3     VIT C/E/ZN/COPPR/LUTEIN/ZEAXAN (PRESERVISION AREDS 2 ORAL) [VIT C/E/ZN/COPPR/LUTEIN/ZEAXAN (PRESERVISION AREDS 2 ORAL)] Take 1 tablet by mouth 2 (two) times a day.       ciprofloxacin (CIPRO) 500 MG tablet Take 1 tablet (500 mg) by mouth 2 times daily (Patient not taking: Reported on 5/4/2023) 6 tablet 0       Allergies   Allergen Reactions      Cephalexin Shortness Of Breath     Erythromycin Base [Erythromycin] Rash     Penicillins Unknown     Sulfa (Sulfonamide Antibiotics) [Sulfa Antibiotics] Rash     Vancomycin Itching          Lab Results    Chemistry/lipid CBC Cardiac Enzymes/BNP/TSH/INR   Recent Labs   Lab Test 03/13/23  1023   CHOL 197   HDL 93   LDL 90   TRIG 71     Recent Labs   Lab Test 03/13/23  1023 03/31/22  0802 04/08/21  1036   LDL 90 89 99     Recent Labs   Lab Test 03/13/23  1023      POTASSIUM 4.4   CHLORIDE 103   CO2 24   GLC 92   BUN 18.9   CR 0.93   GFRESTIMATED 59*   DOMONIQUE 9.7     Recent Labs   Lab Test 03/13/23  1023 03/31/22  0802 04/08/21  1036   CR 0.93 0.91 0.86     No results for input(s): A1C in the last 39641 hours.       Recent Labs   Lab Test 03/13/23  1023   WBC 6.0   HGB 13.3   HCT 39.9   MCV 95        Recent Labs   Lab Test 03/13/23  1023 03/31/22  0802 04/08/21  1036   HGB 13.3 13.6 12.9    Recent Labs   Lab Test 06/01/18  0706 05/31/18  2305 05/31/18  1727   TROPONINI 0.31* 0.41* 0.28     Recent Labs   Lab Test 04/08/21  1036 02/10/21  0438   * 518*     Recent Labs   Lab Test 03/13/23  1023   TSH 1.48     Recent Labs   Lab Test 06/01/18  0011 05/31/18  1225   INR 1.16* 1.12*        Medical History  Surgical History Family History Social History   Past Medical History:   Diagnosis Date     Back pain      Furuncle      Hyperlipidemia      Macular degeneration      Macular degeneration      Osteoarthritis      Osteoporosis      SVT (supraventricular tachycardia) (H)      SVT (supraventricular tachycardia) (H)      Past Surgical History:   Procedure Laterality Date     EP ABLATION SVT Right 6/26/2018    Procedure: EP Ablation Supra Ventricular;  Surgeon: Dany Renae MD;  Location: [facility];  Service:      ESOPHAGOSCOPY, GASTROSCOPY, DUODENOSCOPY (EGD), COMBINED N/A 4/4/2022    Procedure: ESOPHAGOGASTRODUODENOSCOPY with esophageal biopsy;  Surgeon: Ashlyn Acuna MD;  Location: [facility]      GASTROSTOMY, INSERT TUBE, COMBINED N/A 2/9/2021    Procedure: PLACEMENT OF GASTROSTOMY TUBE,;  Surgeon: Clinton Pavon MD;  Location: [facility];  Service: General     HC REMOVAL ADENOIDS,PRIMARY,<11 Y/O      Description: Adenoidectomy;  Recorded: 12/31/2007;     HC REMOVAL OF TONSILS,<11 Y/O      Description: Tonsillectomy;  Recorded: 12/31/2007;     HYSTERECTOMY       LAPAROTOMY EXPLORATORY N/A 2/9/2021    Procedure: EXPLORATORY LAPAROTOMY,  REPAIR GRAHM PATCH OF DUODENAL ULCER;  Surgeon: Clinton Pavon MD;  Location: [facility];  Service: General     OOPHORECTOMY       ZC APPENDECTOMY      Description: Appendectomy;  Recorded: 12/31/2007;     [facility] TOTAL KNEE ARTHROPLASTY      Description: Total Knee Arthroplasty;  Recorded: 12/31/2007;     C VAG HYST,RMV TUBE/OVARY,FIX ENTEROCE      Description: Vag Hysterect Uterus <250g Remov Both Ovaries Rep Enterocele;  Recorded: 12/31/2007;     Family History   Problem Relation Age of Onset     Cerebrovascular Disease Father         Social History     Socioeconomic History     Marital status:      Spouse name: Not on file     Number of children: Not on file     Years of education: Not on file     Highest education level: Not on file   Occupational History     Not on file   Tobacco Use     Smoking status: Never     Passive exposure: Never     Smokeless tobacco: Never   Vaping Use     Vaping status: Not on file   Substance and Sexual Activity     Alcohol use: No     Drug use: No     Sexual activity: Not on file   Other Topics Concern     Parent/sibling w/ CABG, MI or angioplasty before 65F 55M? 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           Social Determinants of Health     Financial Resource Strain: Not on file   Food Insecurity: Not on file   Transportation Needs: Not on file   Physical Activity: Not on file   Stress: Not on file   Social Connections: Not on file   Intimate Partner  Violence: Not on file   Housing Stability: Not on file

## 2023-05-27 ENCOUNTER — NURSE TRIAGE (OUTPATIENT)
Dept: NURSING | Facility: CLINIC | Age: 88
End: 2023-05-27
Payer: COMMERCIAL

## 2023-05-27 DIAGNOSIS — Z23 NEED FOR DIPHTHERIA-TETANUS-PERTUSSIS (TDAP) VACCINE: ICD-10-CM

## 2023-05-27 NOTE — TELEPHONE ENCOUNTER
Nurse Triage SBAR    Is this a 2nd Level Triage? YES, LICENSED PRACTITIONER REVIEW IS REQUIRED    Situation: Patient calling regarding vaccine info.    Background: Patient asking about status of TDAP and COVID vaccinations. Updated patient. Patient asked to schedule TDAP.    Assessment: Vaccination inquiry.    Protocol Recommended Disposition:   Home Care - Information or Advice Only Call    Recommendation: Transfer to scheduling for nurse only vaccine visit.     Vaccine scheduling.    Does the patient meet one of the following criteria for ADS visit consideration? 16+ years old, with an [facility] PCP     TIP  Providers, please consider if this condition is appropriate for management at one of our Acute and Diagnostic Services sites.     If patient is a good candidate, please use dotphrase <dot>triageresponse and select Refer to ADS to document.     Reason for Disposition    Health Information question, no triage required and triager able to answer question    Additional Information    Negative: Requesting regular office appointment    Negative: [1] Caller requesting NON-URGENT health information AND [2] PCP's office is the best resource    Negative: RN needs further essential information from caller in order to complete triage    Negative: [1] Caller is not with the adult (patient) AND [2] reporting urgent symptoms    Negative: Lab result questions    Negative: Medication questions    Negative: Caller can't be reached by phone    Negative: Caller has already spoken to PCP or another triager    Protocols used: INFORMATION ONLY CALL - NO TRIAGE-LARRY Sanchez RN, BSN, MSN  [facility] Triage 8:34 AM

## 2023-05-30 DIAGNOSIS — Z23 NEED FOR DIPHTHERIA-TETANUS-PERTUSSIS (TDAP) VACCINE: ICD-10-CM

## 2023-05-30 PROCEDURE — 91312 COVID-19 BIVALENT 12+ (PFIZER): CPT | Performed by: FAMILY MEDICINE

## 2023-05-30 NOTE — PROGRESS NOTES
Patient is due to having COVID..  These have been ordered.  While signing off orders, there is an alert mentioning getting Tdap vaccine at pharmacy per Medicare benefits.  Patient to check with her insurance.    Flex Bain MD

## 2023-05-30 NOTE — TELEPHONE ENCOUNTER
Immunization ordered for her.  Medicare alert mentions getting these shots at pharmacy.  Patient to discuss with insurance.  She is due for COVID-19 and Tdap booster.    Flex Bain MD

## 2023-06-15 ENCOUNTER — OFFICE VISIT (OUTPATIENT)
Dept: CARDIOLOGY | Facility: CLINIC | Age: 88
End: 2023-06-15
Attending: INTERNAL MEDICINE
Payer: COMMERCIAL

## 2023-06-15 VITALS
WEIGHT: 126 LBS | RESPIRATION RATE: 16 BRPM | BODY MASS INDEX: 23.79 KG/M2 | HEIGHT: 61 IN | HEART RATE: 66 BPM | DIASTOLIC BLOOD PRESSURE: 60 MMHG | SYSTOLIC BLOOD PRESSURE: 108 MMHG

## 2023-06-15 DIAGNOSIS — I34.0 MITRAL VALVE INSUFFICIENCY, UNSPECIFIED ETIOLOGY: ICD-10-CM

## 2023-06-15 DIAGNOSIS — R60.0 BILATERAL LOWER EXTREMITY EDEMA: Primary | ICD-10-CM

## 2023-06-15 DIAGNOSIS — I47.10 SVT (SUPRAVENTRICULAR TACHYCARDIA) (H): ICD-10-CM

## 2023-06-15 PROCEDURE — 99214 OFFICE O/P EST MOD 30 MIN: CPT | Performed by: INTERNAL MEDICINE

## 2023-06-15 RX ORDER — FUROSEMIDE 20 MG
20 TABLET ORAL DAILY
Qty: 90 TABLET | Refills: 3 | Status: SHIPPED | OUTPATIENT
Start: 2023-06-15 | End: 2024-03-28

## 2023-06-15 NOTE — PROGRESS NOTES
[facility] HE[facility]   [address], [place] [address]   [email or web address]   OFFICE: [phone]          Thank you Dr. Contreras for asking the [facility] team to participate in the care of your patient, Maria D Moore.     Impression and Plan     1. Supraventricular tachycardia. Maria D underwent successful ablation of slow AV node pathway with cryoablation 26 June 2018.  This denies any subjective recurrence of SVT since my last visit with her.       2.  Mitral insufficiency.  This was felt moderate in degree on last echocardiogram on 1 June 2018.  More recent echocardiogram 28 April 2022 suggested only mild-moderate mitral insufficiency (see Cardiac Diagnostics section below).  Significant progression is not suggested on exam or clinically.    Nonetheless, will obtain repeat echocardiogram as per problem #3.    3.  Bilateral lower extremity edema.  Maria D has noted some bothersome lower extremity edema, albeit fairly mild.  She has chronic edema involving the left ankle after suffering an injury in her teenage years.  She reports it is somewhat worse toward the end of the day particularly if she has been up on her feet for extended periods of time.  Suspect that this is most likely dependent in nature and related to venous insufficiency (she has had bilateral knee surgeries as well).  Plan:     Will pursue a trial of low-dose furosemide at 20 mg.    Will obtain echocardiogram to exclude any structural heart disease that may be a contributor though not strongly suspected.     Tentatively plan on follow-up in 3 months.    35 minutes spent reviewing prior records (including documentation, laboratory studies, cardiac testing/imaging), interview with patient along with physical exam, planning, and subsequent documentation/crafting of note).           History of Present Illness    Once again I would like to thank you again for asking me to participate  "in the care of your patient, Maria D Moore.  As you know, but to reiterate for my own records, Maria D Moore is a 88 year old female with history of SVT.  Maria D underwent successful ablation of slow AV node pathway with cryoablation 26 June 2018.     In follow-up today, Maria D denies any recurrent subjective SVT since my last visit with her.  She denies any chest pain.  Her breathing has been comfortable.  She does feel somewhat \"tired\".  Denies lightheadedness.    Maria D does report that she has recently been having some issues with lower extremity edema.  This seems to be worse if she has been up on her feet for extended periods of times.  She did try being more judicious with limiting her sodium intake which helps some.  She still has some mild bothersome edema.  She has perhaps had some slight increase in shortness of breath though feels this may be related to the poor air quality from the [place].  Denies chest pain.  No palpitations.    Further review of systems is otherwise negative/noncontributory (medical record and 13 point review of systems reviewed as well and pertinent positives noted).         Cardiac Diagnostics      Echocardiogram 28 April 2022:  1. Normal left ventricular size and systolic performance with ejection fraction of 60 to 65%.  2. Trace aortic insufficiency.  3. Mild-moderate mitral insufficiency.  4. Moderate tricuspid insufficiency.  5. Normal right ventricular size and systolic performance.  6. Normal atrial dimensions.    Echocardiogram 1 June 2018 (personally reviewed):  1. Normal left ventricular size and systolic performance with a visually estimated ejection fraction of 65%.   2. There is moderate mitral insufficiency.   3. There is moderate tricuspid insufficiency.   4. Normal right ventricular size and systolic performance.   5. There is moderate bi-atrial enlargement.    When compared to the prior real-time echocardiogram dated 14 August 2010, the degree " "of mitral insufficiency has increased from mild to now moderate.    Echocardiogram 14 August 2010:  1. Normal left ventricular size and systolic performance with ejection fraction of 55%.  2. Moderate tricuspid insufficiency.  3. Mild left atrial enlargement.    Nuclear perfusion imaging study 1 August 2018:  1. Small reversible defect in the anterior/apical segments.  Significant splanchnic artifact reducing sensitivity of finding.  2. Normal left ventricular systolic performance with ejection fraction of 81%.    Event recorder 6 April 2016 through 2 May 2016:  1. Largely normal 30-day event monitor.  2. Symptoms correlating with sinus rhythm and occasional atrial ectopy.    Holter monitor 2 March 2015:  1. Moderate amount of atrial ectopy is present but most of the PACs are singular or short runs at modest rates.   2. No atrial fibrillation identified.                Physical Examination       /60 (BP Location: Right arm, Patient Position: Sitting, Cuff Size: Adult Regular)   Pulse 66   Resp 16   Ht 1.549 m (5' 1\")   Wt 57.2 kg (126 lb)   BMI 23.81 kg/m          Wt Readings from Last 3 Encounters:   06/15/23 57.2 kg (126 lb)   05/04/23 56.7 kg (125 lb)   04/19/23 56.9 kg (125 lb 6.4 oz)       The patient is alert and oriented times three. Sclerae are anicteric. Mucosal membranes are moist. Jugular venous pressure is normal. No significant adenopathy/thyromegally appreciated. Lungs are clear with good expansion. On cardiovascular exam, the patient has a regular S1 and S2. Abdomen is soft and non-tender. Extremities reveal no clubbing, cyanosis, mild bilateral lower extremity edema.         Family History/Social History/Risk Factors   Patient does not smoke.  Family history reviewed, and family history includes Cerebrovascular Disease in her father.          Medical History  Surgical History Family History Social History   Past Medical History:   Diagnosis Date     Back pain      Furuncle      " Hyperlipidemia      Macular degeneration      Macular degeneration      Osteoarthritis      Osteoporosis      SVT (supraventricular tachycardia) (H)      SVT (supraventricular tachycardia) (H)      Past Surgical History:   Procedure Laterality Date     EP ABLATION SVT Right 6/26/2018    Procedure: EP Ablation Supra Ventricular;  Surgeon: Dany Renae MD;  Location: [facility];  Service:      ESOPHAGOSCOPY, GASTROSCOPY, DUODENOSCOPY (EGD), COMBINED N/A 4/4/2022    Procedure: ESOPHAGOGASTRODUODENOSCOPY with esophageal biopsy;  Surgeon: Ashlyn Acuna MD;  Location: [facility] GI     GASTROSTOMY, INSERT TUBE, COMBINED N/A 2/9/2021    Procedure: PLACEMENT OF GASTROSTOMY TUBE,;  Surgeon: Clinton Pavon MD;  Location: [facility] [place];  Service: General     HC REMOVAL ADENOIDS,PRIMARY,<11 Y/O      Description: Adenoidectomy;  Recorded: 12/31/2007;     HC REMOVAL OF TONSILS,<11 Y/O      Description: Tonsillectomy;  Recorded: 12/31/2007;     HYSTERECTOMY       LAPAROTOMY EXPLORATORY N/A 2/9/2021    Procedure: EXPLORATORY LAPAROTOMY,  REPAIR GRAHM PATCH OF DUODENAL ULCER;  Surgeon: Clinton Pavon MD;  Location: [facility] [place];  Service: General     OOPHORECTOMY       Z APPENDECTOMY      Description: Appendectomy;  Recorded: 12/31/2007;     [facility] TOTAL KNEE ARTHROPLASTY      Description: Total Knee Arthroplasty;  Recorded: 12/31/2007;     [facility] VAG HYST,RMV TUBE/OVARY,FIX ENTEROCE      Description: Vag Hysterect Uterus <250g Remov Both Ovaries Rep Enterocele;  Recorded: 12/31/2007;     Family History   Problem Relation Age of Onset     Cerebrovascular Disease Father         Social History     Socioeconomic History     Marital status:      Spouse name: Not on file     Number of children: Not on file     Years of education: Not on file     Highest education level: Not on file   Occupational History     Not on file   Tobacco Use     Smoking status: Never     Passive exposure: Never     Smokeless  tobacco: Never   Vaping Use     Vaping status: Not on file   Substance and Sexual Activity     Alcohol use: No     Drug use: No     Sexual activity: Not on file   Other Topics Concern     Parent/sibling w/ CABG, MI or angioplasty before 65F 55M? 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           Social Determinants of Health     Financial Resource Strain: Not on file   Food Insecurity: Not on file   Transportation Needs: Not on file   Physical Activity: Not on file   Stress: Not on file   Social Connections: Not on file   Intimate Partner Violence: Not on file   Housing Stability: Not on file           Medications  Allergies   Current Outpatient Medications   Medication Sig Dispense Refill     calcium carbonate (OS-DOMONIQUE) 600 mg (1,500 mg) tablet [CALCIUM CARBONATE (OS-DOMONIQUE) 600 MG (1,500 MG) TABLET] Take 600 mg by mouth every other day.              Cholecalciferol (VITAMIN D3) 25 MCG (1000 UT) CAPS Take 1 capsule by mouth daily       cholecalciferol, vitamin D3, 5,000 unit Tab Take 125 mcg by mouth daily Takes every 5 days       furosemide (LASIX) 20 MG tablet Take 1 tablet (20 mg) by mouth daily 90 tablet 3     levothyroxine (SYNTHROID/LEVOTHROID) 25 MCG tablet [LEVOTHYROXINE (SYNTHROID, LEVOTHROID) 25 MCG TABLET] TAKE 1 TABLET DAILY AT 6:00 A.M. 90 tablet 3     metoprolol succinate ER (TOPROL XL) 50 MG 24 hr tablet Take 1 tablet (50 mg) by mouth At Bedtime 90 tablet 3     OMEGA-3/DHA/EPA/FISH OIL (FISH OIL-OMEGA-3 FATTY ACIDS) 300-1,000 mg capsule [OMEGA-3/DHA/EPA/FISH OIL (FISH OIL-OMEGA-3 FATTY ACIDS) 300-1,000 MG CAPSULE] Take 2,000 mg by mouth daily.        pantoprazole (PROTONIX) 40 MG EC tablet Take 1 tablet (40 mg) by mouth daily (Patient taking differently: Take 40 mg by mouth 2 times daily) 90 tablet 3     simvastatin (ZOCOR) 20 MG tablet [SIMVASTATIN (ZOCOR) 20 MG TABLET] TAKE 1 TABLET DAILY AT BEDTIME 90 tablet 3     VIT C/E/ZN/COPPR/LUTEIN/ZEAXAN  (PRESERVISION AREDS 2 ORAL) [VIT C/E/ZN/COPPR/LUTEIN/ZEAXAN (PRESERVISION AREDS 2 ORAL)] Take 1 tablet by mouth 2 (two) times a day.         Allergies   Allergen Reactions     Cephalexin Shortness Of Breath     Erythromycin Base [Erythromycin] Rash     Penicillins Unknown     Sulfa (Sulfonamide Antibiotics) [Sulfa Antibiotics] Rash     Vancomycin Itching          Lab Results    Chemistry/lipid CBC Cardiac Enzymes/BNP/TSH/INR   Recent Labs   Lab Test 03/13/23  1023   CHOL 197   HDL 93   LDL 90   TRIG 71     Recent Labs   Lab Test 03/13/23  1023 03/31/22  0802 04/08/21  1036   LDL 90 89 99     Recent Labs   Lab Test 03/13/23  1023      POTASSIUM 4.4   CHLORIDE 103   CO2 24   GLC 92   BUN 18.9   CR 0.93   GFRESTIMATED 59*   DOMONIQUE 9.7     Recent Labs   Lab Test 03/13/23  1023 03/31/22  0802 04/08/21  1036   CR 0.93 0.91 0.86     No results for input(s): A1C in the last 04865 hours.       Recent Labs   Lab Test 03/13/23  1023   WBC 6.0   HGB 13.3   HCT 39.9   MCV 95        Recent Labs   Lab Test 03/13/23  1023 03/31/22  0802 04/08/21  1036   HGB 13.3 13.6 12.9    Recent Labs   Lab Test 06/01/18  0706 05/31/18  2305 05/31/18  1727   TROPONINI 0.31* 0.41* 0.28     Recent Labs   Lab Test 04/08/21  1036 02/10/21  0438   * 518*     Recent Labs   Lab Test 03/13/23  1023   TSH 1.48     Recent Labs   Lab Test 06/01/18  0011 05/31/18  1225   INR 1.16* 1.12*          Medications  Allergies   Current Outpatient Medications   Medication Sig Dispense Refill     calcium carbonate (OS-DOMONIQUE) 600 mg (1,500 mg) tablet [CALCIUM CARBONATE (OS-DOMONIQUE) 600 MG (1,500 MG) TABLET] Take 600 mg by mouth every other day.              Cholecalciferol (VITAMIN D3) 25 MCG (1000 UT) CAPS Take 1 capsule by mouth daily       cholecalciferol, vitamin D3, 5,000 unit Tab Take 125 mcg by mouth daily Takes every 5 days       furosemide (LASIX) 20 MG tablet Take 1 tablet (20 mg) by mouth daily 90 tablet 3     levothyroxine (SYNTHROID/LEVOTHROID)  25 MCG tablet [LEVOTHYROXINE (SYNTHROID, LEVOTHROID) 25 MCG TABLET] TAKE 1 TABLET DAILY AT 6:00 A.M. 90 tablet 3     metoprolol succinate ER (TOPROL XL) 50 MG 24 hr tablet Take 1 tablet (50 mg) by mouth At Bedtime 90 tablet 3     OMEGA-3/DHA/EPA/FISH OIL (FISH OIL-OMEGA-3 FATTY ACIDS) 300-1,000 mg capsule [OMEGA-3/DHA/EPA/FISH OIL (FISH OIL-OMEGA-3 FATTY ACIDS) 300-1,000 MG CAPSULE] Take 2,000 mg by mouth daily.        pantoprazole (PROTONIX) 40 MG EC tablet Take 1 tablet (40 mg) by mouth daily (Patient taking differently: Take 40 mg by mouth 2 times daily) 90 tablet 3     simvastatin (ZOCOR) 20 MG tablet [SIMVASTATIN (ZOCOR) 20 MG TABLET] TAKE 1 TABLET DAILY AT BEDTIME 90 tablet 3     VIT C/E/ZN/COPPR/LUTEIN/ZEAXAN (PRESERVISION AREDS 2 ORAL) [VIT C/E/ZN/COPPR/LUTEIN/ZEAXAN (PRESERVISION AREDS 2 ORAL)] Take 1 tablet by mouth 2 (two) times a day.        Allergies   Allergen Reactions     Cephalexin Shortness Of Breath     Erythromycin Base [Erythromycin] Rash     Penicillins Unknown     Sulfa (Sulfonamide Antibiotics) [Sulfa Antibiotics] Rash     Vancomycin Itching          Lab Results   Lab Results   Component Value Date     03/13/2023    CO2 24 03/13/2023    CO2 24 03/31/2022    BUN 18.9 03/13/2023    BUN 14 03/31/2022     Lab Results   Component Value Date    WBC 6.0 03/13/2023    HGB 13.3 03/13/2023    HCT 39.9 03/13/2023    MCV 95 03/13/2023     03/13/2023     Lab Results   Component Value Date    CHOL 197 03/13/2023    TRIG 71 03/13/2023    HDL 93 03/13/2023     Lab Results   Component Value Date    INR 1.16 06/01/2018     Lab Results   Component Value Date     04/08/2021     Lab Results   Component Value Date    TROPONINI 0.31 06/01/2018    TROPONINI 0.41 05/31/2018    TROPONINI 0.28 05/31/2018     Lab Results   Component Value Date    TSH 1.48 03/13/2023    TSH 2.62 03/31/2022

## 2023-06-15 NOTE — LETTER
6/15/2023    Flex Bain MD  [address]  [facility] [address]    RE: Maria D Moore       Dear Colleague,     I had the pleasure of seeing Maria D Moore in the [facility].         [facility]   [address], [place] [address]   [email or web address]   OFFICE: [phone]          Thank you Dr. Contreras for asking the [facility] team to participate in the care of your patient, Maria D Moore.     Impression and Plan     1. Supraventricular tachycardia. Maria D underwent successful ablation of slow AV node pathway with cryoablation 26 June 2018.  This denies any subjective recurrence of SVT since my last visit with her.       2.  Mitral insufficiency.  This was felt moderate in degree on last echocardiogram on 1 June 2018.  More recent echocardiogram 28 April 2022 suggested only mild-moderate mitral insufficiency (see Cardiac Diagnostics section below).  Significant progression is not suggested on exam or clinically.  Nonetheless, will obtain repeat echocardiogram as per problem #3.    3.  Bilateral lower extremity edema.  Maria D has noted some bothersome lower extremity edema, albeit fairly mild.  She has chronic edema involving the left ankle after suffering an injury in her teenage years.  She reports it is somewhat worse toward the end of the day particularly if she has been up on her feet for extended periods of time.  Suspect that this is most likely dependent in nature and related to venous insufficiency (she has had bilateral knee surgeries as well).  Plan:   Will pursue a trial of low-dose furosemide at 20 mg.  Will obtain echocardiogram to exclude any structural heart disease that may be a contributor though not strongly suspected.     Tentatively plan on follow-up in 3 months.    35 minutes spent reviewing prior records (including documentation, laboratory studies, cardiac testing/imaging),  "interview with patient along with physical exam, planning, and subsequent documentation/crafting of note).           History of Present Illness    Once again I would like to thank you again for asking me to participate in the care of your patient, Maria D Moore.  As you know, but to reiterate for my own records, Maria D Moore is a 88 year old female with history of SVT.  Maria D underwent successful ablation of slow AV node pathway with cryoablation 26 June 2018.     In follow-up today, Maria D denies any recurrent subjective SVT since my last visit with her.  She denies any chest pain.  Her breathing has been comfortable.  She does feel somewhat \"tired\".  Denies lightheadedness.    Maria D does report that she has recently been having some issues with lower extremity edema.  This seems to be worse if she has been up on her feet for extended periods of times.  She did try being more judicious with limiting her sodium intake which helps some.  She still has some mild bothersome edema.  She has perhaps had some slight increase in shortness of breath though feels this may be related to the poor air quality from the [employer].  Denies chest pain.  No palpitations.    Further review of systems is otherwise negative/noncontributory (medical record and 13 point review of systems reviewed as well and pertinent positives noted).         Cardiac Diagnostics      Echocardiogram 28 April 2022:  Normal left ventricular size and systolic performance with ejection fraction of 60 to 65%.  Trace aortic insufficiency.  Mild-moderate mitral insufficiency.  Moderate tricuspid insufficiency.  Normal right ventricular size and systolic performance.  Normal atrial dimensions.    Echocardiogram 1 June 2018 (personally reviewed):  Normal left ventricular size and systolic performance with a visually estimated ejection fraction of 65%.   There is moderate mitral insufficiency.   There is moderate tricuspid insufficiency. " "  Normal right ventricular size and systolic performance.   There is moderate bi-atrial enlargement.  When compared to the prior real-time echocardiogram dated 14 August 2010, the degree of mitral insufficiency has increased from mild to now moderate.    Echocardiogram 14 August 2010:  Normal left ventricular size and systolic performance with ejection fraction of 55%.  Moderate tricuspid insufficiency.  Mild left atrial enlargement.    Nuclear perfusion imaging study 1 August 2018:  Small reversible defect in the anterior/apical segments.  Significant splanchnic artifact reducing sensitivity of finding.  Normal left ventricular systolic performance with ejection fraction of 81%.    Event recorder 6 April 2016 through 2 May 2016:  Largely normal 30-day event monitor.  Symptoms correlating with sinus rhythm and occasional atrial ectopy.    Holter monitor 2 March 2015:  Moderate amount of atrial ectopy is present but most of the PACs are singular or short runs at modest rates.   No atrial fibrillation identified.                Physical Examination       /60 (BP Location: Right arm, Patient Position: Sitting, Cuff Size: Adult Regular)   Pulse 66   Resp 16   Ht 1.549 m (5' 1\")   Wt 57.2 kg (126 lb)   BMI 23.81 kg/m          Wt Readings from Last 3 Encounters:   06/15/23 57.2 kg (126 lb)   05/04/23 56.7 kg (125 lb)   04/19/23 56.9 kg (125 lb 6.4 oz)       The patient is alert and oriented times three. Sclerae are anicteric. Mucosal membranes are moist. Jugular venous pressure is normal. No significant adenopathy/thyromegally appreciated. Lungs are clear with good expansion. On cardiovascular exam, the patient has a regular S1 and S2. Abdomen is soft and non-tender. Extremities reveal no clubbing, cyanosis, mild bilateral lower extremity edema.         Family History/Social History/Risk Factors   Patient does not smoke.  Family history reviewed, and family history includes Cerebrovascular Disease in her " father.          Medical History  Surgical History Family History Social History   Past Medical History:   Diagnosis Date    Back pain     Furuncle     Hyperlipidemia     Macular degeneration     Macular degeneration     Osteoarthritis     Osteoporosis     SVT (supraventricular tachycardia) (H)     SVT (supraventricular tachycardia) (H)      Past Surgical History:   Procedure Laterality Date    EP ABLATION SVT Right 6/26/2018    Procedure: EP Ablation Supra Ventricular;  Surgeon: Dany Renae MD;  Location: [facility];  Service:     ESOPHAGOSCOPY, GASTROSCOPY, DUODENOSCOPY (EGD), COMBINED N/A 4/4/2022    Procedure: ESOPHAGOGASTRODUODENOSCOPY with esophageal biopsy;  Surgeon: Ashlyn Acuna MD;  Location: [facility] GI    GASTROSTOMY, INSERT TUBE, COMBINED N/A 2/9/2021    Procedure: PLACEMENT OF GASTROSTOMY TUBE,;  Surgeon: Clinton Pavon MD;  Location: [facility];  Service: General    HC REMOVAL ADENOIDS,PRIMARY,<11 Y/O      Description: Adenoidectomy;  Recorded: 12/31/2007;    HC REMOVAL OF TONSILS,<11 Y/O      Description: Tonsillectomy;  Recorded: 12/31/2007;    HYSTERECTOMY      LAPAROTOMY EXPLORATORY N/A 2/9/2021    Procedure: EXPLORATORY LAPAROTOMY,  REPAIR GRAHM PATCH OF DUODENAL ULCER;  Surgeon: Clinton Pavon MD;  Location: [facility];  Service: General    OOPHORECTOMY      [facility] APPENDECTOMY      Description: Appendectomy;  Recorded: 12/31/2007;    [facility] TOTAL KNEE ARTHROPLASTY      Description: Total Knee Arthroplasty;  Recorded: 12/31/2007;    [facility] VAG HYST,RMV TUBE/OVARY,FIX ENTEROCE      Description: Vag Hysterect Uterus <250g Remov Both Ovaries Rep Enterocele;  Recorded: 12/31/2007;     Family History   Problem Relation Age of Onset    Cerebrovascular Disease Father         Social History     Socioeconomic History    Marital status:      Spouse name: Not on file    Number of children: Not on file    Years of education: Not on file    Highest education level: Not on  file   Occupational History    Not on file   Tobacco Use    Smoking status: Never     Passive exposure: Never    Smokeless tobacco: Never   Vaping Use    Vaping status: Not on file   Substance and Sexual Activity    Alcohol use: No    Drug use: No    Sexual activity: Not on file   Other Topics Concern    Parent/sibling w/ CABG, MI or angioplasty before 65F 55M? 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           Social Determinants of Health     Financial Resource Strain: Not on file   Food Insecurity: Not on file   Transportation Needs: Not on file   Physical Activity: Not on file   Stress: Not on file   Social Connections: Not on file   Intimate Partner Violence: Not on file   Housing Stability: Not on file           Medications  Allergies   Current Outpatient Medications   Medication Sig Dispense Refill    calcium carbonate (OS-DOMONIQUE) 600 mg (1,500 mg) tablet [CALCIUM CARBONATE (OS-DOMONIQUE) 600 MG (1,500 MG) TABLET] Take 600 mg by mouth every other day.             Cholecalciferol (VITAMIN D3) 25 MCG (1000 UT) CAPS Take 1 capsule by mouth daily      cholecalciferol, vitamin D3, 5,000 unit Tab Take 125 mcg by mouth daily Takes every 5 days      furosemide (LASIX) 20 MG tablet Take 1 tablet (20 mg) by mouth daily 90 tablet 3    levothyroxine (SYNTHROID/LEVOTHROID) 25 MCG tablet [LEVOTHYROXINE (SYNTHROID, LEVOTHROID) 25 MCG TABLET] TAKE 1 TABLET DAILY AT 6:00 A.M. 90 tablet 3    metoprolol succinate ER (TOPROL XL) 50 MG 24 hr tablet Take 1 tablet (50 mg) by mouth At Bedtime 90 tablet 3    OMEGA-3/DHA/EPA/FISH OIL (FISH OIL-OMEGA-3 FATTY ACIDS) 300-1,000 mg capsule [OMEGA-3/DHA/EPA/FISH OIL (FISH OIL-OMEGA-3 FATTY ACIDS) 300-1,000 MG CAPSULE] Take 2,000 mg by mouth daily.       pantoprazole (PROTONIX) 40 MG EC tablet Take 1 tablet (40 mg) by mouth daily (Patient taking differently: Take 40 mg by mouth 2 times daily) 90 tablet 3    simvastatin (ZOCOR) 20 MG tablet [SIMVASTATIN  (ZOCOR) 20 MG TABLET] TAKE 1 TABLET DAILY AT BEDTIME 90 tablet 3    VIT C/E/ZN/COPPR/LUTEIN/ZEAXAN (PRESERVISION AREDS 2 ORAL) [VIT C/E/ZN/COPPR/LUTEIN/ZEAXAN (PRESERVISION AREDS 2 ORAL)] Take 1 tablet by mouth 2 (two) times a day.         Allergies   Allergen Reactions    Cephalexin Shortness Of Breath    Erythromycin Base [Erythromycin] Rash    Penicillins Unknown    Sulfa (Sulfonamide Antibiotics) [Sulfa Antibiotics] Rash    Vancomycin Itching          Lab Results    Chemistry/lipid CBC Cardiac Enzymes/BNP/TSH/INR   Recent Labs   Lab Test 03/13/23  1023   CHOL 197   HDL 93   LDL 90   TRIG 71     Recent Labs   Lab Test 03/13/23  1023 03/31/22  0802 04/08/21  1036   LDL 90 89 99     Recent Labs   Lab Test 03/13/23  1023      POTASSIUM 4.4   CHLORIDE 103   CO2 24   GLC 92   BUN 18.9   CR 0.93   GFRESTIMATED 59*   DOMONIQUE 9.7     Recent Labs   Lab Test 03/13/23  1023 03/31/22  0802 04/08/21  1036   CR 0.93 0.91 0.86     No results for input(s): A1C in the last 10523 hours.       Recent Labs   Lab Test 03/13/23  1023   WBC 6.0   HGB 13.3   HCT 39.9   MCV 95        Recent Labs   Lab Test 03/13/23  1023 03/31/22  0802 04/08/21  1036   HGB 13.3 13.6 12.9    Recent Labs   Lab Test 06/01/18  0706 05/31/18  2305 05/31/18  1727   TROPONINI 0.31* 0.41* 0.28     Recent Labs   Lab Test 04/08/21  1036 02/10/21  0438   * 518*     Recent Labs   Lab Test 03/13/23  1023   TSH 1.48     Recent Labs   Lab Test 06/01/18  0011 05/31/18  1225   INR 1.16* 1.12*          Medications  Allergies   Current Outpatient Medications   Medication Sig Dispense Refill    calcium carbonate (OS-DOMONIQUE) 600 mg (1,500 mg) tablet [CALCIUM CARBONATE (OS-DOMONIQUE) 600 MG (1,500 MG) TABLET] Take 600 mg by mouth every other day.             Cholecalciferol (VITAMIN D3) 25 MCG (1000 UT) CAPS Take 1 capsule by mouth daily      cholecalciferol, vitamin D3, 5,000 unit Tab Take 125 mcg by mouth daily Takes every 5 days      furosemide (LASIX) 20 MG tablet  Take 1 tablet (20 mg) by mouth daily 90 tablet 3    levothyroxine (SYNTHROID/LEVOTHROID) 25 MCG tablet [LEVOTHYROXINE (SYNTHROID, LEVOTHROID) 25 MCG TABLET] TAKE 1 TABLET DAILY AT 6:00 A.M. 90 tablet 3    metoprolol succinate ER (TOPROL XL) 50 MG 24 hr tablet Take 1 tablet (50 mg) by mouth At Bedtime 90 tablet 3    OMEGA-3/DHA/EPA/FISH OIL (FISH OIL-OMEGA-3 FATTY ACIDS) 300-1,000 mg capsule [OMEGA-3/DHA/EPA/FISH OIL (FISH OIL-OMEGA-3 FATTY ACIDS) 300-1,000 MG CAPSULE] Take 2,000 mg by mouth daily.       pantoprazole (PROTONIX) 40 MG EC tablet Take 1 tablet (40 mg) by mouth daily (Patient taking differently: Take 40 mg by mouth 2 times daily) 90 tablet 3    simvastatin (ZOCOR) 20 MG tablet [SIMVASTATIN (ZOCOR) 20 MG TABLET] TAKE 1 TABLET DAILY AT BEDTIME 90 tablet 3    VIT C/E/ZN/COPPR/LUTEIN/ZEAXAN (PRESERVISION AREDS 2 ORAL) [VIT C/E/ZN/COPPR/LUTEIN/ZEAXAN (PRESERVISION AREDS 2 ORAL)] Take 1 tablet by mouth 2 (two) times a day.        Allergies   Allergen Reactions    Cephalexin Shortness Of Breath    Erythromycin Base [Erythromycin] Rash    Penicillins Unknown    Sulfa (Sulfonamide Antibiotics) [Sulfa Antibiotics] Rash    Vancomycin Itching          Lab Results   Lab Results   Component Value Date     03/13/2023    CO2 24 03/13/2023    CO2 24 03/31/2022    BUN 18.9 03/13/2023    BUN 14 03/31/2022     Lab Results   Component Value Date    WBC 6.0 03/13/2023    HGB 13.3 03/13/2023    HCT 39.9 03/13/2023    MCV 95 03/13/2023     03/13/2023     Lab Results   Component Value Date    CHOL 197 03/13/2023    TRIG 71 03/13/2023    HDL 93 03/13/2023     Lab Results   Component Value Date    INR 1.16 06/01/2018     Lab Results   Component Value Date     04/08/2021     Lab Results   Component Value Date    TROPONINI 0.31 06/01/2018    TROPONINI 0.41 05/31/2018    TROPONINI 0.28 05/31/2018     Lab Results   Component Value Date    TSH 1.48 03/13/2023    TSH 2.62 03/31/2022                    Thank you for  allowing me to participate in the care of your patient.      Sincerely,     Radha Contreras MD     [facility]  cc:   Radha Contreras MD  [address]  [place] [address]

## 2023-06-19 ENCOUNTER — TELEPHONE (OUTPATIENT)
Dept: FAMILY MEDICINE | Facility: CLINIC | Age: 88
End: 2023-06-19
Payer: COMMERCIAL

## 2023-06-19 NOTE — TELEPHONE ENCOUNTER
Negative mammo - repeat 1 year  Patient is scheduled on Thursday June 22nd at [facility] for TDAP and COVID vaccine.     Please sign pended orders

## 2023-07-13 DIAGNOSIS — M81.0 SENILE OSTEOPOROSIS: Primary | ICD-10-CM

## 2023-07-18 ENCOUNTER — TELEPHONE (OUTPATIENT)
Dept: FAMILY MEDICINE | Facility: CLINIC | Age: 88
End: 2023-07-18
Payer: COMMERCIAL

## 2023-07-18 NOTE — TELEPHONE ENCOUNTER
Pt called back, stated that she had not received a message but she is scheduled for an appointment August 7th

## 2023-07-18 NOTE — TELEPHONE ENCOUNTER
Attempted to call patient, left message for return call to clinic. Please assist with scheduling an appointment to discuss symptoms further.    Tessie Zee RN
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
## 2023-07-27 ENCOUNTER — HOSPITAL ENCOUNTER (OUTPATIENT)
Dept: CARDIOLOGY | Facility: HOSPITAL | Age: 88
Discharge: HOME | End: 2023-07-27
Attending: INTERNAL MEDICINE | Admitting: INTERNAL MEDICINE
Payer: COMMERCIAL

## 2023-07-27 DIAGNOSIS — R60.0 BILATERAL LOWER EXTREMITY EDEMA: ICD-10-CM

## 2023-07-27 DIAGNOSIS — I47.10 SVT (SUPRAVENTRICULAR TACHYCARDIA) (H): ICD-10-CM

## 2023-07-27 DIAGNOSIS — I34.0 MITRAL VALVE INSUFFICIENCY, UNSPECIFIED ETIOLOGY: ICD-10-CM

## 2023-07-27 LAB — LVEF ECHO: NORMAL

## 2023-07-27 PROCEDURE — 93306 TTE W/DOPPLER COMPLETE: CPT

## 2023-07-27 PROCEDURE — 93306 TTE W/DOPPLER COMPLETE: CPT | Mod: 26 | Performed by: INTERNAL MEDICINE

## 2023-07-28 ENCOUNTER — LAB (OUTPATIENT)
Dept: LAB | Facility: CLINIC | Age: 88
End: 2023-07-28
Payer: COMMERCIAL

## 2023-07-28 DIAGNOSIS — M81.0 SENILE OSTEOPOROSIS: ICD-10-CM

## 2023-07-28 LAB
CALCIUM SERPL-MCNC: 9.3 MG/DL (ref 8.8–10.2)
CREAT SERPL-MCNC: 0.96 MG/DL (ref 0.51–0.95)
GFR SERPL CREATININE-BSD FRML MDRD: 57 ML/MIN/1.73M2

## 2023-07-28 PROCEDURE — 82310 ASSAY OF CALCIUM: CPT

## 2023-07-28 PROCEDURE — 36415 COLL VENOUS BLD VENIPUNCTURE: CPT

## 2023-07-28 PROCEDURE — 82565 ASSAY OF CREATININE: CPT

## 2023-08-03 ENCOUNTER — ALLIED HEALTH/NURSE VISIT (OUTPATIENT)
Dept: ENDOCRINOLOGY | Facility: CLINIC | Age: 88
End: 2023-08-03
Payer: COMMERCIAL

## 2023-08-03 DIAGNOSIS — M81.0 SENILE OSTEOPOROSIS: Primary | ICD-10-CM

## 2023-08-03 PROCEDURE — 99207 PR NO CHARGE NURSE ONLY: CPT

## 2023-08-03 PROCEDURE — 96372 THER/PROPH/DIAG INJ SC/IM: CPT | Performed by: NURSE PRACTITIONER

## 2023-08-03 NOTE — PROGRESS NOTES
Clinic Administered Medication Documentation      Prolia Documentation    Indication: Prolia  (denosumab) is a prescription medicine used to treat osteoporosis in patients who:   Are at high risk for fracture, meaning patients who have had a fracture related to osteoporosis, or who have multiple risk factors for fracture.  Cannot use another osteoporosis medicine or other osteoporosis medicines did not work well.  The timeline for early/late injections would be 4 weeks early and any time after the 6 month nomi. If a patient receives their injection late, then the subsequent injection would be 6 months from the date that they actually received the injection.    When was the last injection?  23  Was the last injection at least 6 months ago? Yes  Has the prior authorization been completed?  Yes  Is there an active order (written within the past 365 days, with administrations remaining, not ) in the chart?  Yes  Patient denies any dental work involving the bone (e.g. tooth extraction or dental implants) in the past 4 weeks?  Yes  Patient denies plans for any dental work involving the bone (e.g. tooth extraction or dental implants) in the next 4 weeks? Yes    The following steps were completed to comply with the REMS program for Prolia:  Reviewed information in the Medication Guide and Patient Counseling Chart, including the serious risks of Prolia  and the symptoms of each risk.  Advised patient to seek prompt medical attention if they have signs or symptoms of any of the serious risks.  Provided each patient a copy of the Medication Guide and Patient Brochure.    Prior to injection, verified patient identity using patient's name and date of birth. Medication was administered. Please see MAR and medication order for additional information. Patient instructed to remain in clinic for 15 minutes and report any adverse reaction to staff immediately.    Vial/Syringe: Syringe  Was this medication supplied by the  patient? No  Verified that the patient has refills remaining in their prescription.

## 2023-08-07 ENCOUNTER — OFFICE VISIT (OUTPATIENT)
Dept: FAMILY MEDICINE | Facility: CLINIC | Age: 88
End: 2023-08-07
Payer: COMMERCIAL

## 2023-08-07 VITALS
SYSTOLIC BLOOD PRESSURE: 118 MMHG | HEART RATE: 71 BPM | BODY MASS INDEX: 23.79 KG/M2 | HEIGHT: 61 IN | RESPIRATION RATE: 16 BRPM | DIASTOLIC BLOOD PRESSURE: 67 MMHG | WEIGHT: 126 LBS | OXYGEN SATURATION: 96 %

## 2023-08-07 DIAGNOSIS — L98.9 SKIN LESION: Primary | ICD-10-CM

## 2023-08-07 DIAGNOSIS — E67.3 HIGH VITAMIN D LEVEL: ICD-10-CM

## 2023-08-07 DIAGNOSIS — M81.0 SENILE OSTEOPOROSIS: ICD-10-CM

## 2023-08-07 DIAGNOSIS — N18.31 STAGE 3A CHRONIC KIDNEY DISEASE (H): ICD-10-CM

## 2023-08-07 DIAGNOSIS — M79.642 PAIN OF LEFT HAND: ICD-10-CM

## 2023-08-07 PROCEDURE — 82306 VITAMIN D 25 HYDROXY: CPT | Performed by: FAMILY MEDICINE

## 2023-08-07 PROCEDURE — 36415 COLL VENOUS BLD VENIPUNCTURE: CPT | Performed by: FAMILY MEDICINE

## 2023-08-07 PROCEDURE — 99214 OFFICE O/P EST MOD 30 MIN: CPT | Performed by: FAMILY MEDICINE

## 2023-08-07 NOTE — PROGRESS NOTES
Assessment & Plan     ICD-10-CM    1. Skin lesion  L98.9       2. Pain of left hand  M79.642       3. High vitamin D level  E67.3 Vitamin D Deficiency     Vitamin D Deficiency      4. Stage 3a chronic kidney disease (H)  N18.31       5. Osteoporosis Senile  M81.0         Patient comes for a follow-up.  Following issues addressed according to her concern  1: Skin lesion on chest.  This is cherry angioma.  No intervention is needed  2: Pain of the left hand.  Signs and symptoms consistent with carpal tunnel.  She is due to see hand surgeon.  Discussed about doing an EMG.  Patient defers it to visit with hand surgeon  3: High vitamin D level.  She has cut down on intake.  Check vitamin D level today  4: Osteoporosis: Due for Prolia injection.  Discussed that she has had testing done.  5: Stage III CKD: Monitor GFR every 3 to 6 months.     MEDICATIONS:  Continue current medications without change  See Patient Instructions    Flex Bain MD  [facility]    Donovan Killian is a 88 year old, presenting for the following health issues:  RECHECK (Follow up, pt requesting vit d to be checked. Wanting the blood work to be done that needs to be done to get prolia injections. Pt had a recent echo done. Pt has a red spot on her upper chest that she wants looked at. Right hand pain that comes and goes- pt does have an appt on Friday with hand specialist. )        8/7/2023     4:35 PM   Additional Questions   Roomed by Josselyn Maddox CMA   Accompanied by N/A       History of Present Illness       Reason for visit:  Wrist pain  Symptom onset:  3-4 weeks ago  Symptoms include:  Pain  Symptom intensity:  Moderate  Symptom progression:  Staying the same  Had these symptoms before:  Yes  Has tried/received treatment for these symptoms:  No  What makes it worse:  No  What makes it better:  No    She eats 0-1 servings of fruits and vegetables daily.She exercises with enough effort to increase her  "heart rate 30 to 60 minutes per day.  She exercises with enough effort to increase her heart rate 5 days per week.   She is taking medications regularly.     Patient Active Problem List   Diagnosis    Fatigue    Temperature Intolerance To Cold   (Consistent)    Abnormal Blood Chemistry    Osteoarthritis Of The Ankle/Foot (Multiple Joints)    Mixed hyperlipidemia    Generalized Osteoarthritis Of Multiple Sites    Edema    Osteoporosis Senile    Vaginal Discharge (Symptom)    Back Pain    Automatic Atrial Tachycardia    SVT (supraventricular tachycardia) (H)    Hypotension, unspecified hypotension type    Actinic keratosis    Hair thinning    Free intraperitoneal air    Duodenal ulcer with perforation (H)    Hiatal hernia    S/P exploratory laparotomy    Perforated duodenal ulcer (H)    Hypothyroidism    Furuncle    Lightheadedness    Personal history of other drug therapy     Current Outpatient Medications   Medication    calcium carbonate (OS-DOMONIQUE) 600 mg (1,500 mg) tablet    Cholecalciferol (VITAMIN D3) 25 MCG (1000 UT) CAPS    cholecalciferol, vitamin D3, 5,000 unit Tab    furosemide (LASIX) 20 MG tablet    levothyroxine (SYNTHROID/LEVOTHROID) 25 MCG tablet    metoprolol succinate ER (TOPROL XL) 50 MG 24 hr tablet    OMEGA-3/DHA/EPA/FISH OIL (FISH OIL-OMEGA-3 FATTY ACIDS) 300-1,000 mg capsule    pantoprazole (PROTONIX) 40 MG EC tablet    simvastatin (ZOCOR) 20 MG tablet    VIT C/E/ZN/COPPR/LUTEIN/ZEAXAN (PRESERVISION AREDS 2 ORAL)     Current Facility-Administered Medications   Medication    denosumab (PROLIA) injection 60 mg    denosumab (PROLIA) injection 60 mg           Review of Systems   Constitutional, HEENT, cardiovascular, pulmonary, gi and gu systems are negative, except as otherwise noted.      Objective    /67 (BP Location: Left arm, Patient Position: Sitting, Cuff Size: Adult Regular)   Pulse 71   Resp 16   Ht 1.549 m (5' 1\")   Wt 57.2 kg (126 lb)   SpO2 96%   BMI 23.81 kg/m    Body mass " index is 23.81 kg/m .  Physical Exam   GENERAL: healthy, alert and no distress    Lab on 07/28/2023   Component Date Value Ref Range Status    Creatinine 07/28/2023 0.96 (H)  0.51 - 0.95 mg/dL Final    GFR Estimate 07/28/2023 57 (L)  >60 mL/min/1.73m2 Final    Calcium 07/28/2023 9.3  8.8 - 10.2 mg/dL Final

## 2023-08-09 LAB — DEPRECATED CALCIDIOL+CALCIFEROL SERPL-MC: 76 UG/L (ref 20–75)

## 2023-08-11 DIAGNOSIS — K26.5 DUODENAL ULCER WITH PERFORATION (H): ICD-10-CM

## 2023-08-11 RX ORDER — PANTOPRAZOLE SODIUM 40 MG/1
40 TABLET, DELAYED RELEASE ORAL 2 TIMES DAILY
Qty: 60 TABLET | Refills: 3 | Status: SHIPPED | OUTPATIENT
Start: 2023-08-11 | End: 2023-08-11

## 2023-08-11 RX ORDER — PANTOPRAZOLE SODIUM 40 MG/1
40 TABLET, DELAYED RELEASE ORAL 2 TIMES DAILY
Qty: 180 TABLET | Refills: 3 | Status: SHIPPED | OUTPATIENT
Start: 2023-08-11 | End: 2024-03-28

## 2023-08-11 NOTE — TELEPHONE ENCOUNTER
Patient called in and stated she is taking this two times a day and almost out of her Protonix. She stated she NEEDS TO BE CALLED back once this is called in so that she is not worried that this was sent off or not.

## 2023-08-11 NOTE — TELEPHONE ENCOUNTER
Pt called back, stated that the prescription is written wrong. She wants each fill to be for 90 days instead of 60 days as it is cheaper for her with insurance. Pt requests an updated prescription sent in and she would like a call back when that is done, please.

## 2023-09-27 ENCOUNTER — OFFICE VISIT (OUTPATIENT)
Dept: CARDIOLOGY | Facility: CLINIC | Age: 88
End: 2023-09-27
Attending: INTERNAL MEDICINE
Payer: COMMERCIAL

## 2023-09-27 VITALS
DIASTOLIC BLOOD PRESSURE: 60 MMHG | RESPIRATION RATE: 16 BRPM | BODY MASS INDEX: 24 KG/M2 | SYSTOLIC BLOOD PRESSURE: 110 MMHG | OXYGEN SATURATION: 96 % | WEIGHT: 127 LBS | HEART RATE: 62 BPM

## 2023-09-27 DIAGNOSIS — I47.10 SVT (SUPRAVENTRICULAR TACHYCARDIA) (H): ICD-10-CM

## 2023-09-27 DIAGNOSIS — I34.0 MITRAL VALVE INSUFFICIENCY, UNSPECIFIED ETIOLOGY: ICD-10-CM

## 2023-09-27 DIAGNOSIS — R60.0 BILATERAL LOWER EXTREMITY EDEMA: Primary | ICD-10-CM

## 2023-09-27 PROCEDURE — 99214 OFFICE O/P EST MOD 30 MIN: CPT | Performed by: INTERNAL MEDICINE

## 2023-09-27 NOTE — PROGRESS NOTES
[facility] [address], [place] [address]   [email or web address]   OFFICE: [phone]          Impression and Plan     1. Supraventricular tachycardia. Maria D underwent successful ablation of slow AV node pathway with cryoablation 26 June 2018.  This denies any subjective recurrence of SVT since my last visit with her.       2.  Mitral insufficiency.  This was felt moderate in degree on last echocardiogram on 1 June 2018.  Most recent echocardiogram 27 July 2023 suggested only trace-mild mitral insufficiency.     3.  Bilateral lower extremity edema.  Maria D has noted some bothersome lower extremity edema, albeit fairly mild.  She has chronic edema involving the left ankle after suffering an injury in her teenage years.  She reports it is somewhat worse toward the end of the day particularly if she has been up on her feet for extended periods of time.  This has been fairly stable.  She has been using low-dose furosemide intermittently and is comfortable with how she is doing in this regard.    Tentatively plan on follow-up in 1 year.    35 minutes spent reviewing prior records (including documentation, laboratory studies, cardiac testing/imaging), interview with patient along with physical exam, planning, and subsequent documentation/crafting of note).           History of Present Illness    Once again I would like to thank you again for asking me to participate in the care of your patient, Maria D Moore.  As you know, but to reiterate for my own records, Maria D Moore is a 88 year old female with history of SVT.  Maria D underwent successful ablation of slow AV node pathway with cryoablation 26 June 2018.     In follow-up today, Maria D denies any recurrent subjective SVT since my last visit with her.  She denies any chest pain.  Her breathing has been comfortable.      She has had some mild lower extremity edema though this has been fairly  stable.    Further review of systems is otherwise negative/noncontributory (medical record and 13 point review of systems reviewed as well and pertinent positives noted).         Cardiac Diagnostics      Echocardiogram 27 July 2023:  Normal left ventricular size and systolic performance with ejection fraction of 60-65%.  Mild-moderate tricuspid insufficiency.  Normal right ventricular size and systolic performance.  Normal atrial dimensions.    Echocardiogram 28 April 2022:  Normal left ventricular size and systolic performance with ejection fraction of 60 to 65%.  Trace aortic insufficiency.  Mild-moderate mitral insufficiency.  Moderate tricuspid insufficiency.  Normal right ventricular size and systolic performance.  Normal atrial dimensions.    Echocardiogram 1 June 2018 (personally reviewed):  Normal left ventricular size and systolic performance with a visually estimated ejection fraction of 65%.   There is moderate mitral insufficiency.   There is moderate tricuspid insufficiency.   Normal right ventricular size and systolic performance.   There is moderate bi-atrial enlargement.  When compared to the prior real-time echocardiogram dated 14 August 2010, the degree of mitral insufficiency has increased from mild to now moderate.    Echocardiogram 14 August 2010:  Normal left ventricular size and systolic performance with ejection fraction of 55%.  Moderate tricuspid insufficiency.  Mild left atrial enlargement.    Nuclear perfusion imaging study 1 August 2018:  Small reversible defect in the anterior/apical segments.  Significant splanchnic artifact reducing sensitivity of finding.  Normal left ventricular systolic performance with ejection fraction of 81%.    Event recorder 6 April 2016 through 2 May 2016:  Largely normal 30-day event monitor.  Symptoms correlating with sinus rhythm and occasional atrial ectopy.    Holter monitor 2 March 2015:  Moderate amount of atrial ectopy is present but most of the PACs  are singular or short runs at modest rates.   No atrial fibrillation identified.             Physical Examination       /60 (BP Location: Right arm, Patient Position: Sitting, Cuff Size: Adult Regular)   Pulse 62   Resp 16   Wt 57.6 kg (127 lb)   SpO2 96%   BMI 24.00 kg/m          Wt Readings from Last 3 Encounters:   09/27/23 57.6 kg (127 lb)   08/07/23 57.2 kg (126 lb)   06/15/23 57.2 kg (126 lb)     The patient is alert and oriented times three. Sclerae are anicteric. Mucosal membranes are moist. Jugular venous pressure is normal. No significant adenopathy/thyromegally appreciated. Lungs are clear with good expansion. On cardiovascular exam, the patient has a regular S1 and S2. Abdomen is soft and non-tender. Extremities reveal no clubbing, cyanosis, or edema.         Family History/Social History/Risk Factors   Patient does not smoke.  Family history reviewed, and family history includes Cerebrovascular Disease in her father.          Medical History  Surgical History Family History Social History   Past Medical History:   Diagnosis Date    Back pain     Furuncle     Hyperlipidemia     Macular degeneration     Macular degeneration     Osteoarthritis     Osteoporosis     SVT (supraventricular tachycardia) (H)     SVT (supraventricular tachycardia) (H)      Past Surgical History:   Procedure Laterality Date    EP ABLATION SVT Right 6/26/2018    Procedure: EP Ablation Supra Ventricular;  Surgeon: Dany Renae MD;  Location: [facility];  Service:     ESOPHAGOSCOPY, GASTROSCOPY, DUODENOSCOPY (EGD), COMBINED N/A 4/4/2022    Procedure: ESOPHAGOGASTRODUODENOSCOPY with esophageal biopsy;  Surgeon: Ashlyn Acuna MD;  Location: [facility]    GASTROSTOMY, INSERT TUBE, COMBINED N/A 2/9/2021    Procedure: PLACEMENT OF GASTROSTOMY TUBE,;  Surgeon: Clinton Pavon MD;  Location: [facility] OR;  Service: General    HC REMOVAL ADENOIDS,PRIMARY,<13 Y/O      Description: Adenoidectomy;  Recorded:  12/31/2007;     REMOVAL OF TONSILS,<11 Y/O      Description: Tonsillectomy;  Recorded: 12/31/2007;    HYSTERECTOMY      LAPAROTOMY EXPLORATORY N/A 2/9/2021    Procedure: EXPLORATORY LAPAROTOMY,  REPAIR GRAHM PATCH OF DUODENAL ULCER;  Surgeon: Clinton Pavon MD;  Location: [facility];  Service: General    OOPHORECTOMY      [facility] APPENDECTOMY      Description: Appendectomy;  Recorded: 12/31/2007;    [facility] TOTAL KNEE ARTHROPLASTY      Description: Total Knee Arthroplasty;  Recorded: 12/31/2007;    [facility] VAG HYST,RMV TUBE/OVARY,FIX ENTEROCE      Description: Vag Hysterect Uterus <250g Remov Both Ovaries Rep Enterocele;  Recorded: 12/31/2007;     Family History   Problem Relation Age of Onset    Cerebrovascular Disease Father         Social History     Socioeconomic History    Marital status:      Spouse name: Not on file    Number of children: Not on file    Years of education: Not on file    Highest education level: Not on file   Occupational History    Not on file   Tobacco Use    Smoking status: Never     Passive exposure: Never    Smokeless tobacco: Never   Substance and Sexual Activity    Alcohol use: No    Drug use: No    Sexual activity: Not on file   Other Topics Concern    Parent/sibling w/ CABG, MI or angioplasty before 65F 55M? 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           Social Determinants of Health     Financial Resource Strain: Not on file   Food Insecurity: Not on file   Transportation Needs: Not on file   Physical Activity: Not on file   Stress: Not on file   Social Connections: Not on file   Interpersonal Safety: Not on file   Housing Stability: Not on file           Medications  Allergies   Current Outpatient Medications   Medication Sig Dispense Refill    calcium carbonate (OS-DOMONIQUE) 600 mg (1,500 mg) tablet [CALCIUM CARBONATE (OS-DOMONIQUE) 600 MG (1,500 MG) TABLET] Take 600 mg by mouth every other day.             Cholecalciferol  (VITAMIN D3) 25 MCG (1000 UT) CAPS Take 1 capsule by mouth daily      cholecalciferol, vitamin D3, 5,000 unit Tab Take 125 mcg by mouth daily Takes every 5 days      furosemide (LASIX) 20 MG tablet Take 1 tablet (20 mg) by mouth daily 90 tablet 3    levothyroxine (SYNTHROID/LEVOTHROID) 25 MCG tablet [LEVOTHYROXINE (SYNTHROID, LEVOTHROID) 25 MCG TABLET] TAKE 1 TABLET DAILY AT 6:00 A.M. 90 tablet 3    metoprolol succinate ER (TOPROL XL) 50 MG 24 hr tablet Take 1 tablet (50 mg) by mouth At Bedtime 90 tablet 3    OMEGA-3/DHA/EPA/FISH OIL (FISH OIL-OMEGA-3 FATTY ACIDS) 300-1,000 mg capsule [OMEGA-3/DHA/EPA/FISH OIL (FISH OIL-OMEGA-3 FATTY ACIDS) 300-1,000 MG CAPSULE] Take 2,000 mg by mouth daily.       pantoprazole (PROTONIX) 40 MG EC tablet Take 1 tablet (40 mg) by mouth 2 times daily 180 tablet 3    simvastatin (ZOCOR) 20 MG tablet [SIMVASTATIN (ZOCOR) 20 MG TABLET] TAKE 1 TABLET DAILY AT BEDTIME 90 tablet 3    VIT C/E/ZN/COPPR/LUTEIN/ZEAXAN (PRESERVISION AREDS 2 ORAL) [VIT C/E/ZN/COPPR/LUTEIN/ZEAXAN (PRESERVISION AREDS 2 ORAL)] Take 1 tablet by mouth 2 (two) times a day.         Allergies   Allergen Reactions    Cephalexin Shortness Of Breath    Erythromycin Base [Erythromycin] Rash    Penicillins Unknown    Sulfa (Sulfonamide Antibiotics) [Sulfa Antibiotics] Rash    Vancomycin Itching          Lab Results    Chemistry/lipid CBC Cardiac Enzymes/BNP/TSH/INR   Recent Labs   Lab Test 03/13/23  1023   CHOL 197   HDL 93   LDL 90   TRIG 71     Recent Labs   Lab Test 03/13/23  1023 03/31/22  0802 04/08/21  1036   LDL 90 89 99     Recent Labs   Lab Test 07/28/23  0702 03/13/23  1023   NA  --  140   POTASSIUM  --  4.4   CHLORIDE  --  103   CO2  --  24   GLC  --  92   BUN  --  18.9   CR 0.96* 0.93   GFRESTIMATED 57* 59*   DOMONIQUE 9.3 9.7     Recent Labs   Lab Test 07/28/23  0702 03/13/23  1023 03/31/22  0802   CR 0.96* 0.93 0.91     No results for input(s): A1C in the last 42511 hours.       Recent Labs   Lab Test 03/13/23  1023    WBC 6.0   HGB 13.3   HCT 39.9   MCV 95        Recent Labs   Lab Test 03/13/23  1023 03/31/22  0802 04/08/21  1036   HGB 13.3 13.6 12.9    Recent Labs   Lab Test 06/01/18  0706 05/31/18  2305 05/31/18  1727   TROPONINI 0.31* 0.41* 0.28     Recent Labs   Lab Test 04/08/21  1036 02/10/21  0438   * 518*     Recent Labs   Lab Test 03/13/23  1023   TSH 1.48     Recent Labs   Lab Test 06/01/18  0011 05/31/18  1225   INR 1.16* 1.12*          Medications  Allergies   Current Outpatient Medications   Medication Sig Dispense Refill    calcium carbonate (OS-DOMONIQUE) 600 mg (1,500 mg) tablet [CALCIUM CARBONATE (OS-DOMONIQUE) 600 MG (1,500 MG) TABLET] Take 600 mg by mouth every other day.             Cholecalciferol (VITAMIN D3) 25 MCG (1000 UT) CAPS Take 1 capsule by mouth daily      cholecalciferol, vitamin D3, 5,000 unit Tab Take 125 mcg by mouth daily Takes every 5 days      furosemide (LASIX) 20 MG tablet Take 1 tablet (20 mg) by mouth daily 90 tablet 3    levothyroxine (SYNTHROID/LEVOTHROID) 25 MCG tablet [LEVOTHYROXINE (SYNTHROID, LEVOTHROID) 25 MCG TABLET] TAKE 1 TABLET DAILY AT 6:00 A.M. 90 tablet 3    metoprolol succinate ER (TOPROL XL) 50 MG 24 hr tablet Take 1 tablet (50 mg) by mouth At Bedtime 90 tablet 3    OMEGA-3/DHA/EPA/FISH OIL (FISH OIL-OMEGA-3 FATTY ACIDS) 300-1,000 mg capsule [OMEGA-3/DHA/EPA/FISH OIL (FISH OIL-OMEGA-3 FATTY ACIDS) 300-1,000 MG CAPSULE] Take 2,000 mg by mouth daily.       pantoprazole (PROTONIX) 40 MG EC tablet Take 1 tablet (40 mg) by mouth 2 times daily 180 tablet 3    simvastatin (ZOCOR) 20 MG tablet [SIMVASTATIN (ZOCOR) 20 MG TABLET] TAKE 1 TABLET DAILY AT BEDTIME 90 tablet 3    VIT C/E/ZN/COPPR/LUTEIN/ZEAXAN (PRESERVISION AREDS 2 ORAL) [VIT C/E/ZN/COPPR/LUTEIN/ZEAXAN (PRESERVISION AREDS 2 ORAL)] Take 1 tablet by mouth 2 (two) times a day.        Allergies   Allergen Reactions    Cephalexin Shortness Of Breath    Erythromycin Base [Erythromycin] Rash    Penicillins Unknown    Sulfa  (Sulfonamide Antibiotics) [Sulfa Antibiotics] Rash    Vancomycin Itching          Lab Results   Lab Results   Component Value Date     03/13/2023    CO2 24 03/13/2023    CO2 24 03/31/2022    BUN 18.9 03/13/2023    BUN 14 03/31/2022     Lab Results   Component Value Date    WBC 6.0 03/13/2023    HGB 13.3 03/13/2023    HCT 39.9 03/13/2023    MCV 95 03/13/2023     03/13/2023     Lab Results   Component Value Date    CHOL 197 03/13/2023    TRIG 71 03/13/2023    HDL 93 03/13/2023     Lab Results   Component Value Date    INR 1.16 06/01/2018     Lab Results   Component Value Date     04/08/2021     Lab Results   Component Value Date    TROPONINI 0.31 06/01/2018    TROPONINI 0.41 05/31/2018    TROPONINI 0.28 05/31/2018     Lab Results   Component Value Date    TSH 1.48 03/13/2023    TSH 2.62 03/31/2022

## 2023-09-27 NOTE — LETTER
9/27/2023    Flex Bain MD  [address]  [facility] [address]    RE: Maria D Moore       Dear Colleague,     I had the pleasure of seeing Maria D Moore in the [facility].         [facility] [address], [place] [address]   [email or web address]   OFFICE: [phone]          Impression and Plan     1. Supraventricular tachycardia. Maria D underwent successful ablation of slow AV node pathway with cryoablation 26 June 2018.  This denies any subjective recurrence of SVT since my last visit with her.       2.  Mitral insufficiency.  This was felt moderate in degree on last echocardiogram on 1 June 2018.  Most recent echocardiogram 27 July 2023 suggested only trace-mild mitral insufficiency.     3.  Bilateral lower extremity edema.  Maria D has noted some bothersome lower extremity edema, albeit fairly mild.  She has chronic edema involving the left ankle after suffering an injury in her teenage years.  She reports it is somewhat worse toward the end of the day particularly if she has been up on her feet for extended periods of time.  This has been fairly stable.  She has been using low-dose furosemide intermittently and is comfortable with how she is doing in this regard.    Tentatively plan on follow-up in 1 year.    35 minutes spent reviewing prior records (including documentation, laboratory studies, cardiac testing/imaging), interview with patient along with physical exam, planning, and subsequent documentation/crafting of note).           History of Present Illness    Once again I would like to thank you again for asking me to participate in the care of your patient, Maria D Moore.  As you know, but to reiterate for my own records, Maria D Moore is a 88 year old female with history of SVT.  Maria D underwent successful ablation of slow AV node pathway with cryoablation 26 June 2018.     In follow-up today, Maria D denies  any recurrent subjective SVT since my last visit with her.  She denies any chest pain.  Her breathing has been comfortable.      She has had some mild lower extremity edema though this has been fairly stable.    Further review of systems is otherwise negative/noncontributory (medical record and 13 point review of systems reviewed as well and pertinent positives noted).         Cardiac Diagnostics      Echocardiogram 27 July 2023:  Normal left ventricular size and systolic performance with ejection fraction of 60-65%.  Mild-moderate tricuspid insufficiency.  Normal right ventricular size and systolic performance.  Normal atrial dimensions.    Echocardiogram 28 April 2022:  Normal left ventricular size and systolic performance with ejection fraction of 60 to 65%.  Trace aortic insufficiency.  Mild-moderate mitral insufficiency.  Moderate tricuspid insufficiency.  Normal right ventricular size and systolic performance.  Normal atrial dimensions.    Echocardiogram 1 June 2018 (personally reviewed):  Normal left ventricular size and systolic performance with a visually estimated ejection fraction of 65%.   There is moderate mitral insufficiency.   There is moderate tricuspid insufficiency.   Normal right ventricular size and systolic performance.   There is moderate bi-atrial enlargement.  When compared to the prior real-time echocardiogram dated 14 August 2010, the degree of mitral insufficiency has increased from mild to now moderate.    Echocardiogram 14 August 2010:  Normal left ventricular size and systolic performance with ejection fraction of 55%.  Moderate tricuspid insufficiency.  Mild left atrial enlargement.    Nuclear perfusion imaging study 1 August 2018:  Small reversible defect in the anterior/apical segments.  Significant splanchnic artifact reducing sensitivity of finding.  Normal left ventricular systolic performance with ejection fraction of 81%.    Event recorder 6 April 2016 through 2 May  2016:  Largely normal 30-day event monitor.  Symptoms correlating with sinus rhythm and occasional atrial ectopy.    Holter monitor 2 March 2015:  Moderate amount of atrial ectopy is present but most of the PACs are singular or short runs at modest rates.   No atrial fibrillation identified.             Physical Examination       /60 (BP Location: Right arm, Patient Position: Sitting, Cuff Size: Adult Regular)   Pulse 62   Resp 16   Wt 57.6 kg (127 lb)   SpO2 96%   BMI 24.00 kg/m          Wt Readings from Last 3 Encounters:   09/27/23 57.6 kg (127 lb)   08/07/23 57.2 kg (126 lb)   06/15/23 57.2 kg (126 lb)     The patient is alert and oriented times three. Sclerae are anicteric. Mucosal membranes are moist. Jugular venous pressure is normal. No significant adenopathy/thyromegally appreciated. Lungs are clear with good expansion. On cardiovascular exam, the patient has a regular S1 and S2. Abdomen is soft and non-tender. Extremities reveal no clubbing, cyanosis, or edema.         Family History/Social History/Risk Factors   Patient does not smoke.  Family history reviewed, and family history includes Cerebrovascular Disease in her father.          Medical History  Surgical History Family History Social History   Past Medical History:   Diagnosis Date    Back pain     Furuncle     Hyperlipidemia     Macular degeneration     Macular degeneration     Osteoarthritis     Osteoporosis     SVT (supraventricular tachycardia) (H)     SVT (supraventricular tachycardia) (H)      Past Surgical History:   Procedure Laterality Date    EP ABLATION SVT Right 6/26/2018    Procedure: EP Ablation Supra Ventricular;  Surgeon: Dany Renae MD;  Location: [facility];  Service:     ESOPHAGOSCOPY, GASTROSCOPY, DUODENOSCOPY (EGD), COMBINED N/A 4/4/2022    Procedure: ESOPHAGOGASTRODUODENOSCOPY with esophageal biopsy;  Surgeon: Ashlyn Acuna MD;  Location: [facility] GI    GASTROSTOMY, INSERT TUBE, COMBINED N/A 2/9/2021     Procedure: PLACEMENT OF GASTROSTOMY TUBE,;  Surgeon: Clinton Pavon MD;  Location: [facility] OR;  Service: General    HC REMOVAL ADENOIDS,PRIMARY,<11 Y/O      Description: Adenoidectomy;  Recorded: 12/31/2007;    HC REMOVAL OF TONSILS,<11 Y/O      Description: Tonsillectomy;  Recorded: 12/31/2007;    HYSTERECTOMY      LAPAROTOMY EXPLORATORY N/A 2/9/2021    Procedure: EXPLORATORY LAPAROTOMY,  REPAIR GRAHM PATCH OF DUODENAL ULCER;  Surgeon: Clinton Pavon MD;  Location: [facility];  Service: General    OOPHORECTOMY      Z APPENDECTOMY      Description: Appendectomy;  Recorded: 12/31/2007;    [facility] TOTAL KNEE ARTHROPLASTY      Description: Total Knee Arthroplasty;  Recorded: 12/31/2007;    [facility] VAG HYST,RMV TUBE/OVARY,FIX ENTEROCE      Description: Vag Hysterect Uterus <250g Remov Both Ovaries Rep Enterocele;  Recorded: 12/31/2007;     Family History   Problem Relation Age of Onset    Cerebrovascular Disease Father         Social History     Socioeconomic History    Marital status:      Spouse name: Not on file    Number of children: Not on file    Years of education: Not on file    Highest education level: Not on file   Occupational History    Not on file   Tobacco Use    Smoking status: Never     Passive exposure: Never    Smokeless tobacco: Never   Substance and Sexual Activity    Alcohol use: No    Drug use: No    Sexual activity: Not on file   Other Topics Concern    Parent/sibling w/ CABG, MI or angioplasty before 65F 55M? 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           Social Determinants of Health     Financial Resource Strain: Not on file   Food Insecurity: Not on file   Transportation Needs: Not on file   Physical Activity: Not on file   Stress: Not on file   Social Connections: Not on file   Interpersonal Safety: Not on file   Housing Stability: Not on file           Medications  Allergies   Current Outpatient Medications    Medication Sig Dispense Refill    calcium carbonate (OS-DOMONIQUE) 600 mg (1,500 mg) tablet [CALCIUM CARBONATE (OS-DOMONIQUE) 600 MG (1,500 MG) TABLET] Take 600 mg by mouth every other day.             Cholecalciferol (VITAMIN D3) 25 MCG (1000 UT) CAPS Take 1 capsule by mouth daily      cholecalciferol, vitamin D3, 5,000 unit Tab Take 125 mcg by mouth daily Takes every 5 days      furosemide (LASIX) 20 MG tablet Take 1 tablet (20 mg) by mouth daily 90 tablet 3    levothyroxine (SYNTHROID/LEVOTHROID) 25 MCG tablet [LEVOTHYROXINE (SYNTHROID, LEVOTHROID) 25 MCG TABLET] TAKE 1 TABLET DAILY AT 6:00 A.M. 90 tablet 3    metoprolol succinate ER (TOPROL XL) 50 MG 24 hr tablet Take 1 tablet (50 mg) by mouth At Bedtime 90 tablet 3    OMEGA-3/DHA/EPA/FISH OIL (FISH OIL-OMEGA-3 FATTY ACIDS) 300-1,000 mg capsule [OMEGA-3/DHA/EPA/FISH OIL (FISH OIL-OMEGA-3 FATTY ACIDS) 300-1,000 MG CAPSULE] Take 2,000 mg by mouth daily.       pantoprazole (PROTONIX) 40 MG EC tablet Take 1 tablet (40 mg) by mouth 2 times daily 180 tablet 3    simvastatin (ZOCOR) 20 MG tablet [SIMVASTATIN (ZOCOR) 20 MG TABLET] TAKE 1 TABLET DAILY AT BEDTIME 90 tablet 3    VIT C/E/ZN/COPPR/LUTEIN/ZEAXAN (PRESERVISION AREDS 2 ORAL) [VIT C/E/ZN/COPPR/LUTEIN/ZEAXAN (PRESERVISION AREDS 2 ORAL)] Take 1 tablet by mouth 2 (two) times a day.         Allergies   Allergen Reactions    Cephalexin Shortness Of Breath    Erythromycin Base [Erythromycin] Rash    Penicillins Unknown    Sulfa (Sulfonamide Antibiotics) [Sulfa Antibiotics] Rash    Vancomycin Itching          Lab Results    Chemistry/lipid CBC Cardiac Enzymes/BNP/TSH/INR   Recent Labs   Lab Test 03/13/23  1023   CHOL 197   HDL 93   LDL 90   TRIG 71     Recent Labs   Lab Test 03/13/23  1023 03/31/22  0802 04/08/21  1036   LDL 90 89 99     Recent Labs   Lab Test 07/28/23  0702 03/13/23  1023   NA  --  140   POTASSIUM  --  4.4   CHLORIDE  --  103   CO2  --  24   GLC  --  92   BUN  --  18.9   CR 0.96* 0.93   GFRESTIMATED 57* 59*    DOMONIQUE 9.3 9.7     Recent Labs   Lab Test 07/28/23  0702 03/13/23  1023 03/31/22  0802   CR 0.96* 0.93 0.91     No results for input(s): A1C in the last 43793 hours.       Recent Labs   Lab Test 03/13/23  1023   WBC 6.0   HGB 13.3   HCT 39.9   MCV 95        Recent Labs   Lab Test 03/13/23  1023 03/31/22  0802 04/08/21  1036   HGB 13.3 13.6 12.9    Recent Labs   Lab Test 06/01/18  0706 05/31/18  2305 05/31/18  1727   TROPONINI 0.31* 0.41* 0.28     Recent Labs   Lab Test 04/08/21  1036 02/10/21  0438   * 518*     Recent Labs   Lab Test 03/13/23  1023   TSH 1.48     Recent Labs   Lab Test 06/01/18  0011 05/31/18  1225   INR 1.16* 1.12*          Medications  Allergies   Current Outpatient Medications   Medication Sig Dispense Refill    calcium carbonate (OS-DOMONIQUE) 600 mg (1,500 mg) tablet [CALCIUM CARBONATE (OS-DOMONIQUE) 600 MG (1,500 MG) TABLET] Take 600 mg by mouth every other day.             Cholecalciferol (VITAMIN D3) 25 MCG (1000 UT) CAPS Take 1 capsule by mouth daily      cholecalciferol, vitamin D3, 5,000 unit Tab Take 125 mcg by mouth daily Takes every 5 days      furosemide (LASIX) 20 MG tablet Take 1 tablet (20 mg) by mouth daily 90 tablet 3    levothyroxine (SYNTHROID/LEVOTHROID) 25 MCG tablet [LEVOTHYROXINE (SYNTHROID, LEVOTHROID) 25 MCG TABLET] TAKE 1 TABLET DAILY AT 6:00 A.M. 90 tablet 3    metoprolol succinate ER (TOPROL XL) 50 MG 24 hr tablet Take 1 tablet (50 mg) by mouth At Bedtime 90 tablet 3    OMEGA-3/DHA/EPA/FISH OIL (FISH OIL-OMEGA-3 FATTY ACIDS) 300-1,000 mg capsule [OMEGA-3/DHA/EPA/FISH OIL (FISH OIL-OMEGA-3 FATTY ACIDS) 300-1,000 MG CAPSULE] Take 2,000 mg by mouth daily.       pantoprazole (PROTONIX) 40 MG EC tablet Take 1 tablet (40 mg) by mouth 2 times daily 180 tablet 3    simvastatin (ZOCOR) 20 MG tablet [SIMVASTATIN (ZOCOR) 20 MG TABLET] TAKE 1 TABLET DAILY AT BEDTIME 90 tablet 3    VIT C/E/ZN/COPPR/LUTEIN/ZEAXAN (PRESERVISION AREDS 2 ORAL) [VIT C/E/ZN/COPPR/LUTEIN/ZEAXAN  (PRESERVISION AREDS 2 ORAL)] Take 1 tablet by mouth 2 (two) times a day.        Allergies   Allergen Reactions    Cephalexin Shortness Of Breath    Erythromycin Base [Erythromycin] Rash    Penicillins Unknown    Sulfa (Sulfonamide Antibiotics) [Sulfa Antibiotics] Rash    Vancomycin Itching          Lab Results   Lab Results   Component Value Date     03/13/2023    CO2 24 03/13/2023    CO2 24 03/31/2022    BUN 18.9 03/13/2023    BUN 14 03/31/2022     Lab Results   Component Value Date    WBC 6.0 03/13/2023    HGB 13.3 03/13/2023    HCT 39.9 03/13/2023    MCV 95 03/13/2023     03/13/2023     Lab Results   Component Value Date    CHOL 197 03/13/2023    TRIG 71 03/13/2023    HDL 93 03/13/2023     Lab Results   Component Value Date    INR 1.16 06/01/2018     Lab Results   Component Value Date     04/08/2021     Lab Results   Component Value Date    TROPONINI 0.31 06/01/2018    TROPONINI 0.41 05/31/2018    TROPONINI 0.28 05/31/2018     Lab Results   Component Value Date    TSH 1.48 03/13/2023    TSH 2.62 03/31/2022                      Thank you for allowing me to participate in the care of your patient.      Sincerely,     Radha Contreras MD     [facility]  cc:   Radha Contreras MD  [address]  [place] [address]

## 2023-10-24 ENCOUNTER — TELEPHONE (OUTPATIENT)
Dept: ENDOCRINOLOGY | Facility: CLINIC | Age: 88
End: 2023-10-24

## 2023-10-24 NOTE — TELEPHONE ENCOUNTER
[facility] Call Center    Phone Message    May a detailed message be left on voicemail: yes     Reason for Call: Other: Maria D called and is requesting a call back please no specific question provided.      Action Taken: Other: Endo    Travel Screening: Not Applicable

## 2023-12-07 ENCOUNTER — ANCILLARY PROCEDURE (OUTPATIENT)
Dept: MAMMOGRAPHY | Facility: CLINIC | Age: 88
End: 2023-12-07
Attending: FAMILY MEDICINE
Payer: COMMERCIAL

## 2023-12-07 DIAGNOSIS — Z12.31 VISIT FOR SCREENING MAMMOGRAM: ICD-10-CM

## 2023-12-07 PROCEDURE — 77067 SCR MAMMO BI INCL CAD: CPT

## 2023-12-13 ENCOUNTER — TELEPHONE (OUTPATIENT)
Dept: ENDOCRINOLOGY | Facility: CLINIC | Age: 88
End: 2023-12-13
Payer: COMMERCIAL

## 2023-12-13 DIAGNOSIS — M81.0 SENILE OSTEOPOROSIS: ICD-10-CM

## 2023-12-13 DIAGNOSIS — Z92.29 PERSONAL HISTORY OF OTHER DRUG THERAPY: Primary | ICD-10-CM

## 2023-12-13 NOTE — TELEPHONE ENCOUNTER
[facility] Call Center    Phone Message    May a detailed message be left on voicemail: yes     Reason for Call: Other: Pt requesting call back from a nurse, pt would not provided a specific question. Pt just requested the call back be before 10am if possible today pt will be leaving the house at 10.

## 2024-02-07 ENCOUNTER — OFFICE VISIT (OUTPATIENT)
Dept: ENDOCRINOLOGY | Facility: CLINIC | Age: 89
End: 2024-02-07
Payer: COMMERCIAL

## 2024-02-07 VITALS
SYSTOLIC BLOOD PRESSURE: 108 MMHG | DIASTOLIC BLOOD PRESSURE: 70 MMHG | BODY MASS INDEX: 23.6 KG/M2 | HEART RATE: 65 BPM | HEIGHT: 61 IN | WEIGHT: 125 LBS | OXYGEN SATURATION: 95 %

## 2024-02-07 DIAGNOSIS — M81.0 SENILE OSTEOPOROSIS: Primary | ICD-10-CM

## 2024-02-07 PROCEDURE — 99214 OFFICE O/P EST MOD 30 MIN: CPT | Performed by: NURSE PRACTITIONER

## 2024-02-07 NOTE — PROGRESS NOTES
[facility]  ENDOCRINOLOGY    Osteoporosis Follow Up 2/7/2024    Maria D Moore, 11/24/1934, [id number]          Reason for visit      1. Osteoporosis Senile        History     Maria D Moore is a very pleasant 89 year old old female who presents for follow up.   SUMMARY:  As you know, she took a fall when she became syncopal with atrial fibrillation on August 13th, 2010. Initially plain radiographs were negative but with persistent pain. A subsequent CT scan on September 26th, 2010, did show the sacral insufficiency fractures both sacral wings extending into the S2 sacral body and nondisplaced fracture of the right pubic bone. MRI in September 2010 confirmed these findings. A followup CT done on March 9 2011, indicates again a broad based bilateral sacral insufficiency fractures and ununited fracture of the right pubic bone. She is here for further recommendations. I should note that at the time she was hospitalized with her fractures, she was told that her vitamin D level was low and she was treated with 90526 International Units of vitamin D weekly for three months. Subsequently, her level chuck just above 50 ng/mL has had serious  pelvic fractures while on bisphosphonates for at least six years. A 78-year-old woman with severe osteoporosis who has completed 2 years of teriparatide.  We discussed options for following with antiresorptive agent and I do think that denosumab would be preferable for her and she was in agreement with this plan.    Maria D is here today to discuss the findings on her most recent Dexa Scan. She had a significant loss of BMD marked in her Spine, of >7%. With further inspection and assistance from Ryan Martínez, Lead Dexa Tech, Dr Nomi Miranda, Endocrinology, and Dr Minerva Painter, Endocrinology, it was found that L 3 and L 4 were included in the scoring and should not have been. Findings in bilateral hips and Forearm were stable. The question was whether pt was failing the Prolia  injections that she has been getting.        TODAY:    Maria D returns today in follow-up for Osteoporosis. She has had no falls in the last year. She continues to get Prolia injections without difficulty.  She is due for another Dexa Scan in December. She walks regularly. Her most recent Vit D level was high at 75. She is currently taking Calcium daily, which has (we think) 400 international unit(s) of Vit D with it. She is also taking 5000 international unit(s) every 5 days. Calcium level was 9.3.        Risk Factors     The following high- risk conditions have been ruled out: celiac disease, eating disorders, gastric bypass, hyperparathyroidism, inflammatory bowel disease, hyperthyroidism, rheumatoid arthritis, lupus, chronic kidney disease.     Maria D Moore has the following risk factors: Age, Female gender,  and Low BMI     She is not on high risk medications such as glucocorticoids, anti-coagulants, anti-convulsants, chemotherapy or levothyroxine.    Patient deniesHysterectomy, Oophrectomy, Breast cancer and Family history of breast cancer.      Past Medical History     Patient Active Problem List   Diagnosis    Fatigue    Temperature Intolerance To Cold   (Consistent)    Abnormal Blood Chemistry    Osteoarthritis Of The Ankle/Foot (Multiple Joints)    Mixed hyperlipidemia    Generalized Osteoarthritis Of Multiple Sites    Edema    Osteoporosis Senile    Vaginal Discharge (Symptom)    Back Pain    Automatic Atrial Tachycardia    SVT (supraventricular tachycardia)    Hypotension, unspecified hypotension type    Actinic keratosis    Hair thinning    Free intraperitoneal air    Duodenal ulcer with perforation (H)    Hiatal hernia    S/P exploratory laparotomy    Perforated duodenal ulcer (H)    Hypothyroidism    Furuncle    Lightheadedness    Personal history of other drug therapy       Family History       family history includes Cerebrovascular Disease in her father.    Social History      reports  "that she has never smoked. She has never been exposed to tobacco smoke. She has never used smokeless tobacco. She reports that she does not drink alcohol and does not use drugs.      Review of Systems     Patient denies current pain, limited mobility, fractures.   Remainder per HPI.      Vital Signs     /70 (BP Location: Right arm, Patient Position: Sitting, Cuff Size: Adult Regular)   Pulse 65   Ht 1.54 m (5' 0.63\")   Wt 56.7 kg (125 lb)   SpO2 95%   BMI 23.91 kg/m      Physical Exam     Constitutional:  Well developed, Well nourished  HENT:  Normocephalic,   Neck: normal in appearance  Eyes:  PERRL, Conjunctiva pink  Respiratory:  No respiratory distress  Skin: No acanthosis nigricans, lipoatrophy or lipodystrophy  Neurologic:  Alert & oriented x 3, nonfocal  Psychiatric:  Affect, Mood, Insight appropriate      Assessment     1. Osteoporosis Senile        Plan     She will continue on the Prolia injections. She will get another Dexa Scan done in December of this year.         Deisy Mckeon NP  [facility]  2/7/2024  12:28 PM    Current Medications     Outpatient Medications Prior to Visit   Medication Sig Dispense Refill    calcium carbonate (OS-DOMONIQUE) 600 mg (1,500 mg) tablet [CALCIUM CARBONATE (OS-DOMONIQUE) 600 MG (1,500 MG) TABLET] Take 600 mg by mouth every other day.             cholecalciferol, vitamin D3, 5,000 unit Tab Take 125 mcg by mouth daily Takes every 5 days      furosemide (LASIX) 20 MG tablet Take 1 tablet (20 mg) by mouth daily 90 tablet 3    levothyroxine (SYNTHROID/LEVOTHROID) 25 MCG tablet [LEVOTHYROXINE (SYNTHROID, LEVOTHROID) 25 MCG TABLET] TAKE 1 TABLET DAILY AT 6:00 A.M. 90 tablet 3    metoprolol succinate ER (TOPROL XL) 50 MG 24 hr tablet Take 1 tablet (50 mg) by mouth At Bedtime 90 tablet 3    OMEGA-3/DHA/EPA/FISH OIL (FISH OIL-OMEGA-3 FATTY ACIDS) 300-1,000 mg capsule [OMEGA-3/DHA/EPA/FISH OIL (FISH OIL-OMEGA-3 FATTY ACIDS) 300-1,000 MG CAPSULE] Take 2,000 mg by mouth daily.   "     pantoprazole (PROTONIX) 40 MG EC tablet Take 1 tablet (40 mg) by mouth 2 times daily 180 tablet 3    simvastatin (ZOCOR) 20 MG tablet [SIMVASTATIN (ZOCOR) 20 MG TABLET] TAKE 1 TABLET DAILY AT BEDTIME 90 tablet 3    VIT C/E/ZN/COPPR/LUTEIN/ZEAXAN (PRESERVISION AREDS 2 ORAL) [VIT C/E/ZN/COPPR/LUTEIN/ZEAXAN (PRESERVISION AREDS 2 ORAL)] Take 1 tablet by mouth 2 (two) times a day.      Cholecalciferol (VITAMIN D3) 25 MCG (1000 UT) CAPS Take 1 capsule by mouth daily       Facility-Administered Medications Prior to Visit   Medication Dose Route Frequency Provider Last Rate Last Admin    denosumab (PROLIA) injection 60 mg  60 mg Subcutaneous Q6 Months Deisy Mckeon NP        denosumab (PROLIA) injection 60 mg  60 mg Subcutaneous Q6 Months Heather Mckeonca, NP   60 mg at 08/03/23 1017    denosumab (PROLIA) injection 60 mg  60 mg Subcutaneous Q6 Months Deisy Mckeon, NP   60 mg at 07/26/22 0856         Lab Results     TSH   Date Value Ref Range Status   03/13/2023 1.48 0.30 - 4.20 uIU/mL Final   03/31/2022 2.62 0.30 - 5.00 uIU/mL Final           Imaging Results   Last DEXA scan:  No valid procedures specified.      Study Result    Narrative & Impression   EXAM: DX HIP/PELVIS/SPINE, DX WRIST/HEEL/RADIUS  LOCATION: [facility]  DATE/TIME: 10/24/2022 11:59 AM     INDICATION: Senile osteoporosis. Postmenopausal.  COMPARISON: None.  TECHNIQUE: Dual-energy x-ray absorptiometry performed with routine technique.     FINDINGS:     Lumbar Spine: L1-L4: BMD: 1.198 g/cm2. T-score: 0.2. Z-score: 2.1  RIGHT Hip Total: BMD: 0.855 g/cm2. T-score: -1.2. Z-score: 1.2  RIGHT Hip Femoral neck: BMD: 0.844 g/cm2. T-score: -1.4. Z-score: 1.1  LEFT Hip Total: BMD: 0.804 g/cm2. T-score: -1.6. Z-score: 0.8  LEFT Hip Femoral neck: BMD: 0.712 g/cm2. T-score: -2.3. Z-score: 0.2  LEFT Radius 33%: BMD: 0.488 g/cm2. T-score: -3.1. Z-score: 0.3     WHO Criteria:  Normal: T score at or above -1 SD  Osteopenia: T score between -1  and -2.5 SD  Osteoporosis: T score at or below -2.5 SD     COMPARISON: There has been a 7.0% decrease in lumbar spine BMD. There has been a 1.2% increase in bilateral hip BMD.     FRAX Results: Not applicable due to osteoporosis.      RECOMMENDATIONS:   Consider treatment if major osteoporotic fracture score is greater than or equal to 20%. Consider treatment if hip fracture score is greater than or equal to 3%.                                                                      IMPRESSION: OSTEOPOROSIS. T score meets the World Health Organization (WHO) criteria for osteoporosis at one or more measured sites. The risk of osteoporotic fracture increased approximately two-fold for each SD decrease in T-score.

## 2024-02-07 NOTE — LETTER
2/7/2024         RE: Maria D Moore  [address]  [facility] [address]        Dear Colleague,    Thank you for referring your patient, Maria D Moore, to the [facility]. Please see a copy of my visit note below.    [facility]  ENDOCRINOLOGY    Osteoporosis Follow Up 2/7/2024    Maria D Moore, 11/24/1934, [id number]          Reason for visit      1. Osteoporosis Senile        History     Maria D Moore is a very pleasant 89 year old old female who presents for follow up.   SUMMARY:  As you know, she took a fall when she became syncopal with atrial fibrillation on August 13th, 2010. Initially plain radiographs were negative but with persistent pain. A subsequent CT scan on September 26th, 2010, did show the sacral insufficiency fractures both sacral wings extending into the S2 sacral body and nondisplaced fracture of the right pubic bone. MRI in September 2010 confirmed these findings. A followup CT done on March 9 2011, indicates again a broad based bilateral sacral insufficiency fractures and ununited fracture of the right pubic bone. She is here for further recommendations. I should note that at the time she was hospitalized with her fractures, she was told that her vitamin D level was low and she was treated with 23079 International Units of vitamin D weekly for three months. Subsequently, her level chuck just above 50 ng/mL has had serious  pelvic fractures while on bisphosphonates for at least six years. A 78-year-old woman with severe osteoporosis who has completed 2 years of teriparatide.  We discussed options for following with antiresorptive agent and I do think that denosumab would be preferable for her and she was in agreement with this plan.    Maria D is here today to discuss the findings on her most recent Dexa Scan. She had a significant loss of BMD marked in her Spine, of >7%. With further inspection and assistance from Ryan Martínez, Lead Dexa Tech,   Nomi Miranda, Endocrinology, and Dr Minerva Painter, Endocrinology, it was found that L 3 and L 4 were included in the scoring and should not have been. Findings in bilateral hips and Forearm were stable. The question was whether pt was failing the Prolia injections that she has been getting.        TODAY:    Maria D returns today in follow-up for Osteoporosis. She has had no falls in the last year. She continues to get Prolia injections without difficulty.  She is due for another Dexa Scan in December. She walks regularly. Her most recent Vit D level was high at 75. She is currently taking Calcium daily, which has (we think) 400 international unit(s) of Vit D with it. She is also taking 5000 international unit(s) every 5 days. Calcium level was 9.3.        Risk Factors     The following high- risk conditions have been ruled out: celiac disease, eating disorders, gastric bypass, hyperparathyroidism, inflammatory bowel disease, hyperthyroidism, rheumatoid arthritis, lupus, chronic kidney disease.     Maria D Moore has the following risk factors: Age, Female gender,  and Low BMI     She is not on high risk medications such as glucocorticoids, anti-coagulants, anti-convulsants, chemotherapy or levothyroxine.    Patient deniesHysterectomy, Oophrectomy, Breast cancer and Family history of breast cancer.      Past Medical History     Patient Active Problem List   Diagnosis     Fatigue     Temperature Intolerance To Cold   (Consistent)     Abnormal Blood Chemistry     Osteoarthritis Of The Ankle/Foot (Multiple Joints)     Mixed hyperlipidemia     Generalized Osteoarthritis Of Multiple Sites     Edema     Osteoporosis Senile     Vaginal Discharge (Symptom)     Back Pain     Automatic Atrial Tachycardia     SVT (supraventricular tachycardia)     Hypotension, unspecified hypotension type     Actinic keratosis     Hair thinning     Free intraperitoneal air     Duodenal ulcer with perforation (H)     Hiatal hernia  "    S/P exploratory laparotomy     Perforated duodenal ulcer (H)     Hypothyroidism     Furuncle     Lightheadedness     Personal history of other drug therapy       Family History       family history includes Cerebrovascular Disease in her father.    Social History      reports that she has never smoked. She has never been exposed to tobacco smoke. She has never used smokeless tobacco. She reports that she does not drink alcohol and does not use drugs.      Review of Systems     Patient denies current pain, limited mobility, fractures.   Remainder per HPI.      Vital Signs     /70 (BP Location: Right arm, Patient Position: Sitting, Cuff Size: Adult Regular)   Pulse 65   Ht 1.54 m (5' 0.63\")   Wt 56.7 kg (125 lb)   SpO2 95%   BMI 23.91 kg/m      Physical Exam     Constitutional:  Well developed, Well nourished  HENT:  Normocephalic,   Neck: normal in appearance  Eyes:  PERRL, Conjunctiva pink  Respiratory:  No respiratory distress  Skin: No acanthosis nigricans, lipoatrophy or lipodystrophy  Neurologic:  Alert & oriented x 3, nonfocal  Psychiatric:  Affect, Mood, Insight appropriate      Assessment     1. Osteoporosis Senile        Plan     She will continue on the Prolia injections. She will get another Dexa Scan done in December of this year.         Deisy Mckeon NP  [facility]  2/7/2024  12:28 PM    Current Medications     Outpatient Medications Prior to Visit   Medication Sig Dispense Refill     calcium carbonate (OS-DOMONIQUE) 600 mg (1,500 mg) tablet [CALCIUM CARBONATE (OS-DOMONIQUE) 600 MG (1,500 MG) TABLET] Take 600 mg by mouth every other day.              cholecalciferol, vitamin D3, 5,000 unit Tab Take 125 mcg by mouth daily Takes every 5 days       furosemide (LASIX) 20 MG tablet Take 1 tablet (20 mg) by mouth daily 90 tablet 3     levothyroxine (SYNTHROID/LEVOTHROID) 25 MCG tablet [LEVOTHYROXINE (SYNTHROID, LEVOTHROID) 25 MCG TABLET] TAKE 1 TABLET DAILY AT 6:00 A.M. 90 tablet 3     metoprolol " succinate ER (TOPROL XL) 50 MG 24 hr tablet Take 1 tablet (50 mg) by mouth At Bedtime 90 tablet 3     OMEGA-3/DHA/EPA/FISH OIL (FISH OIL-OMEGA-3 FATTY ACIDS) 300-1,000 mg capsule [OMEGA-3/DHA/EPA/FISH OIL (FISH OIL-OMEGA-3 FATTY ACIDS) 300-1,000 MG CAPSULE] Take 2,000 mg by mouth daily.        pantoprazole (PROTONIX) 40 MG EC tablet Take 1 tablet (40 mg) by mouth 2 times daily 180 tablet 3     simvastatin (ZOCOR) 20 MG tablet [SIMVASTATIN (ZOCOR) 20 MG TABLET] TAKE 1 TABLET DAILY AT BEDTIME 90 tablet 3     VIT C/E/ZN/COPPR/LUTEIN/ZEAXAN (PRESERVISION AREDS 2 ORAL) [VIT C/E/ZN/COPPR/LUTEIN/ZEAXAN (PRESERVISION AREDS 2 ORAL)] Take 1 tablet by mouth 2 (two) times a day.       Cholecalciferol (VITAMIN D3) 25 MCG (1000 UT) CAPS Take 1 capsule by mouth daily       Facility-Administered Medications Prior to Visit   Medication Dose Route Frequency Provider Last Rate Last Admin     denosumab (PROLIA) injection 60 mg  60 mg Subcutaneous Q6 Months Deisy Mckeon NP         denosumab (PROLIA) injection 60 mg  60 mg Subcutaneous Q6 Months Deisy Mckeon, NP   60 mg at 08/03/23 1017     denosumab (PROLIA) injection 60 mg  60 mg Subcutaneous Q6 Months Deisy Mckeon, NP   60 mg at 07/26/22 0856         Lab Results     TSH   Date Value Ref Range Status   03/13/2023 1.48 0.30 - 4.20 uIU/mL Final   03/31/2022 2.62 0.30 - 5.00 uIU/mL Final           Imaging Results   Last DEXA scan:  No valid procedures specified.      Study Result    Narrative & Impression   EXAM: DX HIP/PELVIS/SPINE, DX WRIST/HEEL/RADIUS  LOCATION: [facility]  DATE/TIME: 10/24/2022 11:59 AM     INDICATION: Senile osteoporosis. Postmenopausal.  COMPARISON: None.  TECHNIQUE: Dual-energy x-ray absorptiometry performed with routine technique.     FINDINGS:     Lumbar Spine: L1-L4: BMD: 1.198 g/cm2. T-score: 0.2. Z-score: 2.1  RIGHT Hip Total: BMD: 0.855 g/cm2. T-score: -1.2. Z-score: 1.2  RIGHT Hip Femoral neck: BMD: 0.844 g/cm2. T-score: -1.4.  Z-score: 1.1  LEFT Hip Total: BMD: 0.804 g/cm2. T-score: -1.6. Z-score: 0.8  LEFT Hip Femoral neck: BMD: 0.712 g/cm2. T-score: -2.3. Z-score: 0.2  LEFT Radius 33%: BMD: 0.488 g/cm2. T-score: -3.1. Z-score: 0.3     WHO Criteria:  Normal: T score at or above -1 SD  Osteopenia: T score between -1 and -2.5 SD  Osteoporosis: T score at or below -2.5 SD     COMPARISON: There has been a 7.0% decrease in lumbar spine BMD. There has been a 1.2% increase in bilateral hip BMD.     FRAX Results: Not applicable due to osteoporosis.      RECOMMENDATIONS:   Consider treatment if major osteoporotic fracture score is greater than or equal to 20%. Consider treatment if hip fracture score is greater than or equal to 3%.                                                                      IMPRESSION: OSTEOPOROSIS. T score meets the World Health Organization (WHO) criteria for osteoporosis at one or more measured sites. The risk of osteoporotic fracture increased approximately two-fold for each SD decrease in T-score.        Again, thank you for allowing me to participate in the care of your patient.        Sincerely,        Deisy Mckeon NP

## 2024-02-29 ENCOUNTER — LAB (OUTPATIENT)
Dept: LAB | Facility: CLINIC | Age: 89
End: 2024-02-29
Payer: COMMERCIAL

## 2024-02-29 DIAGNOSIS — M81.0 SENILE OSTEOPOROSIS: ICD-10-CM

## 2024-02-29 DIAGNOSIS — E78.2 MIXED HYPERLIPIDEMIA: Primary | ICD-10-CM

## 2024-02-29 DIAGNOSIS — E03.9 HYPOTHYROIDISM: ICD-10-CM

## 2024-02-29 PROCEDURE — 82565 ASSAY OF CREATININE: CPT

## 2024-02-29 PROCEDURE — 80061 LIPID PANEL: CPT

## 2024-02-29 PROCEDURE — 82310 ASSAY OF CALCIUM: CPT

## 2024-02-29 PROCEDURE — 84443 ASSAY THYROID STIM HORMONE: CPT

## 2024-02-29 PROCEDURE — 36415 COLL VENOUS BLD VENIPUNCTURE: CPT

## 2024-02-29 PROCEDURE — 82306 VITAMIN D 25 HYDROXY: CPT

## 2024-03-01 LAB
CALCIUM SERPL-MCNC: 9.2 MG/DL (ref 8.8–10.2)
CHOLEST SERPL-MCNC: 173 MG/DL
CREAT SERPL-MCNC: 1.05 MG/DL (ref 0.51–0.95)
EGFRCR SERPLBLD CKD-EPI 2021: 51 ML/MIN/1.73M2
FASTING STATUS PATIENT QL REPORTED: YES
HDLC SERPL-MCNC: 87 MG/DL
LDLC SERPL CALC-MCNC: 74 MG/DL
NONHDLC SERPL-MCNC: 86 MG/DL
TRIGL SERPL-MCNC: 62 MG/DL
TSH SERPL DL<=0.005 MIU/L-ACNC: 2.17 UIU/ML (ref 0.3–4.2)
VIT D+METAB SERPL-MCNC: 62 NG/ML (ref 20–50)

## 2024-03-05 ENCOUNTER — ALLIED HEALTH/NURSE VISIT (OUTPATIENT)
Dept: ENDOCRINOLOGY | Facility: CLINIC | Age: 89
End: 2024-03-05
Payer: COMMERCIAL

## 2024-03-05 DIAGNOSIS — M81.0 SENILE OSTEOPOROSIS: Primary | ICD-10-CM

## 2024-03-05 PROCEDURE — 96372 THER/PROPH/DIAG INJ SC/IM: CPT | Performed by: NURSE PRACTITIONER

## 2024-03-05 PROCEDURE — 99207 PR NO CHARGE NURSE ONLY: CPT

## 2024-03-05 NOTE — PROGRESS NOTES
"Prolia Injection Phone Screen      Screening questions have been asked 2-3 days prior to administration visit for Prolia. If any questions are answered with \"Yes,\" this phone encounter were will routed to ordering provider for further evaluation.     1.  When was the last injection?  8/3/23    2.  Has insurance for this injection been verified?  Yes    3.  Did you experience any new onset achiness or rashes that lasted for over a month with your previous Prolia injection?   No    4.  Do you have a fever over 101?F or a new deep cough that is unusual for you today? No    5.  Have you started any new medications in the last 6 months that you were told could affect your immune system? These may have been prescribed by oncologist, transplant, rheumatology, or dermatology.   No    6.  In the last 6 months have you have gastric bypass or parathyroid surgery?   No    7.  Do you plan dental work requiring drilling into the bone such as implants/extractions or oral surgery in the next 2-3 months?   No    8. Do you have new insurance since the last injection?    Patient informed if symptoms discussed above present prior to their administration appointment, they are to notify clinic immediately.     Minda Kim RN          The following steps were completed to comply with the REMS program for Prolia:  1. Ordering provider has previously reviewed information in the Medication Guide and Patient Counseling Chart, including the serious risks of Prolia  and the symptoms of each risk and have been advised to seek prompt medical attention if they have signs or symptoms of any of the serious risks.  2. Provided each patient a copy of the Medication Guide and Patient Brochure.  See MAR for administration details.   Indication: Prolia  (denosumab) is a prescription medicine used to treat osteoporosis in patients who:   Are at high risk for fracture, meaning patients who have had a fracture related to osteoporosis, or who have " multiple risk factors for fracture; Cannot use another osteoporosis medicine or other osteoporosis medicines did not work well.   The timeline for early/late injections would be 4 weeks early and any time after the 6 month nomi. If a patient receives their injection late, then the subsequent injection would be 6 months from the date that they actually received the injection    Have the screening questions been asked prior to this administration? Yes    Clinic Administered Medication Documentation      Prolia Documentation    Indication: Prolia  (denosumab) is a prescription medicine used to treat osteoporosis in patients who:   Are at high risk for fracture, meaning patients who have had a fracture related to osteoporosis, or who have multiple risk factors for fracture.  Cannot use another osteoporosis medicine or other osteoporosis medicines did not work well.  The timeline for early/late injections would be 4 weeks early and any time after the 6 month nomi. If a patient receives their injection late, then the subsequent injection would be 6 months from the date that they actually received the injection.    When was the last injection?  8/3/23  Was the last injection at least 6 months ago? Yes  Has the prior authorization been completed?  Yes  Is there an active order (written within the past 365 days, with administrations remaining, not ) in the chart?  Yes  Patient denies any dental work involving the bone (e.g. tooth extraction or dental implants) in the past 4 weeks?  Yes  Patient denies plans for any dental work involving the bone (e.g. tooth extraction or dental implants) in the next 4 weeks? Yes    The following steps were completed to comply with the REMS program for Prolia:  Reviewed information in the Medication Guide and Patient Counseling Chart, including the serious risks of Prolia  and the symptoms of each risk.  Advised patient to seek prompt medical attention if they have signs or symptoms of  any of the serious risks.  Provided each patient a copy of the Medication Guide and Patient Brochure.    Prior to injection, verified patient identity using patient's name and date of birth. Medication was administered. Please see MAR and medication order for additional information. Patient instructed to remain in clinic for 15 minutes and report any adverse reaction to staff immediately.    Vial/Syringe: Syringe  Was this medication supplied by the patient? No  Verified that the patient has refills remaining in their prescription.

## 2024-03-20 ENCOUNTER — TELEPHONE (OUTPATIENT)
Dept: FAMILY MEDICINE | Facility: CLINIC | Age: 89
End: 2024-03-20
Payer: COMMERCIAL

## 2024-03-20 NOTE — TELEPHONE ENCOUNTER
200- Dr Jerrica Greenberg cancelled pt case due to tube feed not being stopped. He discussed situation with anesthesia and it was decided to be scheduled for 3-14-23. Dr Jerrica Greenberg to scheduling to reschedule.    [address] [facility] in [place] informed  [id number] -rich in xray informed and Deshawn Dong in cyto informed Spoke with patient and relayed message that her appt 3/28/24 with Dr. Bain is the first available and we would not be able to get her in any sooner.     Candice Ellison RN

## 2024-03-20 NOTE — TELEPHONE ENCOUNTER
Reason for Call:  Other appointment    Detailed comments: She has an appointment  [id number], but wants a sooner appointment, He daughter is, ill and going in for surgery, She needs sooner if possible    Phone Number Patient can be reached at: Cell number on file:    Telephone Information:   Mobile [phone]       Best Time: any    Can we leave a detailed message on this number? YES    Call taken on 3/20/2024 at 8:35 AM by Lucinda Bell

## 2024-03-28 ENCOUNTER — OFFICE VISIT (OUTPATIENT)
Dept: FAMILY MEDICINE | Facility: CLINIC | Age: 89
End: 2024-03-28
Payer: COMMERCIAL

## 2024-03-28 ENCOUNTER — TELEPHONE (OUTPATIENT)
Dept: FAMILY MEDICINE | Facility: CLINIC | Age: 89
End: 2024-03-28

## 2024-03-28 VITALS
HEART RATE: 65 BPM | SYSTOLIC BLOOD PRESSURE: 105 MMHG | BODY MASS INDEX: 23.53 KG/M2 | DIASTOLIC BLOOD PRESSURE: 65 MMHG | RESPIRATION RATE: 16 BRPM | HEIGHT: 61 IN | WEIGHT: 124.6 LBS | OXYGEN SATURATION: 98 %

## 2024-03-28 DIAGNOSIS — I47.10 SUPRAVENTRICULAR TACHYCARDIA (H): ICD-10-CM

## 2024-03-28 DIAGNOSIS — E03.9 HYPOTHYROIDISM, UNSPECIFIED TYPE: ICD-10-CM

## 2024-03-28 DIAGNOSIS — Z00.00 ENCOUNTER FOR MEDICARE ANNUAL WELLNESS EXAM: Primary | ICD-10-CM

## 2024-03-28 DIAGNOSIS — K26.5 DUODENAL ULCER WITH PERFORATION (H): ICD-10-CM

## 2024-03-28 DIAGNOSIS — E67.3 HYPERVITAMINOSIS D: ICD-10-CM

## 2024-03-28 DIAGNOSIS — R60.0 BILATERAL LOWER EXTREMITY EDEMA: ICD-10-CM

## 2024-03-28 DIAGNOSIS — E78.5 HYPERLIPIDEMIA, UNSPECIFIED HYPERLIPIDEMIA TYPE: ICD-10-CM

## 2024-03-28 DIAGNOSIS — N18.31 STAGE 3A CHRONIC KIDNEY DISEASE (H): ICD-10-CM

## 2024-03-28 LAB
ALBUMIN SERPL BCG-MCNC: 4.2 G/DL (ref 3.5–5.2)
ALP SERPL-CCNC: 51 U/L (ref 40–150)
ALT SERPL W P-5'-P-CCNC: 12 U/L (ref 0–50)
ANION GAP SERPL CALCULATED.3IONS-SCNC: 12 MMOL/L (ref 7–15)
AST SERPL W P-5'-P-CCNC: 18 U/L (ref 0–45)
BASOPHILS # BLD AUTO: 0.1 10E3/UL (ref 0–0.2)
BASOPHILS NFR BLD AUTO: 1 %
BILIRUB SERPL-MCNC: 0.6 MG/DL
BUN SERPL-MCNC: 19.7 MG/DL (ref 8–23)
CALCIUM SERPL-MCNC: 10.3 MG/DL (ref 8.8–10.2)
CHLORIDE SERPL-SCNC: 104 MMOL/L (ref 98–107)
CREAT SERPL-MCNC: 0.97 MG/DL (ref 0.51–0.95)
DEPRECATED HCO3 PLAS-SCNC: 25 MMOL/L (ref 22–29)
EGFRCR SERPLBLD CKD-EPI 2021: 56 ML/MIN/1.73M2
EOSINOPHIL # BLD AUTO: 0.4 10E3/UL (ref 0–0.7)
EOSINOPHIL NFR BLD AUTO: 6 %
ERYTHROCYTE [DISTWIDTH] IN BLOOD BY AUTOMATED COUNT: 13.2 % (ref 10–15)
GLUCOSE SERPL-MCNC: 87 MG/DL (ref 70–99)
HCT VFR BLD AUTO: 38.4 % (ref 35–47)
HGB BLD-MCNC: 12.8 G/DL (ref 11.7–15.7)
IMM GRANULOCYTES # BLD: 0 10E3/UL
IMM GRANULOCYTES NFR BLD: 0 %
LYMPHOCYTES # BLD AUTO: 1.7 10E3/UL (ref 0.8–5.3)
LYMPHOCYTES NFR BLD AUTO: 25 %
MCH RBC QN AUTO: 31.1 PG (ref 26.5–33)
MCHC RBC AUTO-ENTMCNC: 33.3 G/DL (ref 31.5–36.5)
MCV RBC AUTO: 93 FL (ref 78–100)
MONOCYTES # BLD AUTO: 0.5 10E3/UL (ref 0–1.3)
MONOCYTES NFR BLD AUTO: 7 %
NEUTROPHILS # BLD AUTO: 4.1 10E3/UL (ref 1.6–8.3)
NEUTROPHILS NFR BLD AUTO: 61 %
PLATELET # BLD AUTO: 225 10E3/UL (ref 150–450)
POTASSIUM SERPL-SCNC: 4.1 MMOL/L (ref 3.4–5.3)
PROT SERPL-MCNC: 6.4 G/DL (ref 6.4–8.3)
RBC # BLD AUTO: 4.12 10E6/UL (ref 3.8–5.2)
SODIUM SERPL-SCNC: 141 MMOL/L (ref 135–145)
VIT D+METAB SERPL-MCNC: 61 NG/ML (ref 20–50)
WBC # BLD AUTO: 6.7 10E3/UL (ref 4–11)

## 2024-03-28 PROCEDURE — 85025 COMPLETE CBC W/AUTO DIFF WBC: CPT | Performed by: FAMILY MEDICINE

## 2024-03-28 PROCEDURE — 36415 COLL VENOUS BLD VENIPUNCTURE: CPT | Performed by: FAMILY MEDICINE

## 2024-03-28 PROCEDURE — 82306 VITAMIN D 25 HYDROXY: CPT | Performed by: FAMILY MEDICINE

## 2024-03-28 PROCEDURE — 80053 COMPREHEN METABOLIC PANEL: CPT | Performed by: FAMILY MEDICINE

## 2024-03-28 PROCEDURE — G0439 PPPS, SUBSEQ VISIT: HCPCS | Performed by: FAMILY MEDICINE

## 2024-03-28 PROCEDURE — 99214 OFFICE O/P EST MOD 30 MIN: CPT | Mod: 25 | Performed by: FAMILY MEDICINE

## 2024-03-28 RX ORDER — PANTOPRAZOLE SODIUM 40 MG/1
40 TABLET, DELAYED RELEASE ORAL 2 TIMES DAILY
Qty: 180 TABLET | Refills: 3 | Status: SHIPPED | OUTPATIENT
Start: 2024-03-28

## 2024-03-28 RX ORDER — METOPROLOL SUCCINATE 50 MG/1
50 TABLET, EXTENDED RELEASE ORAL AT BEDTIME
Qty: 90 TABLET | Refills: 3 | Status: SHIPPED | OUTPATIENT
Start: 2024-03-28

## 2024-03-28 RX ORDER — SIMVASTATIN 20 MG
TABLET ORAL
Qty: 90 TABLET | Refills: 3 | Status: SHIPPED | OUTPATIENT
Start: 2024-03-28

## 2024-03-28 RX ORDER — LEVOTHYROXINE SODIUM 25 UG/1
TABLET ORAL
Qty: 90 TABLET | Refills: 3 | Status: SHIPPED | OUTPATIENT
Start: 2024-03-28

## 2024-03-28 RX ORDER — FUROSEMIDE 20 MG
20 TABLET ORAL DAILY
Qty: 90 TABLET | Refills: 3 | Status: SHIPPED | OUTPATIENT
Start: 2024-03-28 | End: 2024-09-17 | Stop reason: ALTCHOICE

## 2024-03-28 SDOH — HEALTH STABILITY: PHYSICAL HEALTH: ON AVERAGE, HOW MANY DAYS PER WEEK DO YOU ENGAGE IN MODERATE TO STRENUOUS EXERCISE (LIKE A BRISK WALK)?: 4 DAYS

## 2024-03-28 ASSESSMENT — SOCIAL DETERMINANTS OF HEALTH (SDOH): HOW OFTEN DO YOU GET TOGETHER WITH FRIENDS OR RELATIVES?: THREE TIMES A WEEK

## 2024-03-28 NOTE — TELEPHONE ENCOUNTER
Spoke to patient to relay information from note below. Patient stated she did this last month and will reach out to [facility] to see what to do next.    Candice Ellison RN

## 2024-03-28 NOTE — TELEPHONE ENCOUNTER
Patient called back and told me that she called and got everything set up with [facility] pharmacy. She just would like the prescriptions sent back to them because they are still not showing anything on their end.

## 2024-03-28 NOTE — TELEPHONE ENCOUNTER
Patient called back and was notified. Voiced understanding and had no further questions or concerns.

## 2024-03-28 NOTE — TELEPHONE ENCOUNTER
General Call      Reason for Call:  patient called in VERY anxious about her medications. She stated she called them already and they told her that they do not have any medications on file that we sent in today. She wants a call back regarding this, she is very worked up about it.       Could we send this information to you in [facility] or would you prefer to receive a phone call?:   Patient would prefer a phone call   Okay to leave a detailed message?: Yes at Home number on file [phone] (home)

## 2024-03-28 NOTE — PROGRESS NOTES
Preventive Care Visit  [facility]  Flex Bain MD, Family Medicine  Mar 28, 2024      Assessment & Plan     ICD-10-CM    1. Encounter for Medicare annual wellness exam  Z00.00       2. Bilateral lower extremity edema  R60.0 furosemide (LASIX) 20 MG tablet      3. Hypothyroidism, unspecified type  E03.9 levothyroxine (SYNTHROID/LEVOTHROID) 25 MCG tablet     TSH with free T4 reflex     CANCELED: TSH with free T4 reflex      4. Duodenal ulcer with perforation (H)  K26.5 pantoprazole (PROTONIX) 40 MG EC tablet      5. Hyperlipidemia, unspecified hyperlipidemia type  E78.5 simvastatin (ZOCOR) 20 MG tablet     Lipid panel     CANCELED: Lipid panel      6. Stage 3a chronic kidney disease (H)  N18.31 Comprehensive metabolic panel (BMP + Alb, Alk Phos, ALT, AST, Total. Bili, TP)     CBC with platelets and differential     Comprehensive metabolic panel (BMP + Alb, Alk Phos, ALT, AST, Total. Bili, TP)     CBC with platelets and differential      7. Supraventricular tachycardia  I47.10       8. Hypervitaminosis D  E67.3 Vitamin D Deficiency     Vitamin D Deficiency        Medical decision making: Patient presents today for an annual exam.  She is slightly stressed due to diagnosis of possible abdominal lesion in her daughter.  Her daughter is 60 years of age status post bariatric surgery with weight loss and a lesion with recent biopsy.  Following issues addressed today  1: Hyperlipidemia: Reviewed recent lipid panel.  Continue current medication.  2: Hypothyroidism: TSH is normal.  Continue current medication  3: Noted hypervitaminosis.  She has cut down on taking 5000 unit of vitamin D every 5 days.  Recheck vitamin D today.  If still high, would advise to take it once a week.  This is due to the fact that she is also taking calcium plus vitamin D.  4: Stage IIIa CKD: Check CMP and CBC as above  5: SVT: This is stable.  On metoprolol and follows with cardiology  6: History of duodenal  ulcer: On Protonix.  Has had GI endoscopy and no further follow-up is recommended  We will check a CBC today  7: Medication refill for Lasix for bilateral leg edema.  Check electrolytes and kidney function today as above.  Patient follows with advanced dermatology and wanted to review some of the recent treatment spots.  They look stable.  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  She is a retired RN.      Counseling  Appropriate preventive services were discussed with this patient, including applicable screening as appropriate for fall prevention, nutrition, physical activity, Tobacco-use cessation, weight loss and cognition.  Checklist reviewing preventive services available has been given to the patient.  Reviewed patient's diet, addressing concerns and/or questions.   She is at risk for psychosocial distress and has been provided with information to reduce risk.   Updated plan of care.  Patient reported difficulty with activities of daily living were addressed today.    MEDICATIONS:  Continue current medications without change  See Patient Instructions    Donovan Killian is a 89 year old, presenting for the following:  Wellness Visit (Pt is fasting today, pt would like some spots checked out on her skin. )        3/28/2024     7:19 AM   Additional Questions   Roomed by Josselyn Maddox CMA         Health Care Directive  Patient does not have a Health Care Directive or Living Will:   HPI        3/28/2024   General Health   How would you rate your overall physical health? Good   Feel stress (tense, anxious, or unable to sleep) Only a little   (!) STRESS CONCERN      3/28/2024   Nutrition   Diet: Regular (no restrictions)         3/28/2024   Exercise   Days per week of moderate/strenous exercise 4 days         3/28/2024   Social Factors   Frequency of gathering with friends or relatives Three times a  week   Worry food won't last until get money to buy more No   Food not last or not have enough money for food? No   Do you have housing?  Yes   Are you worried about losing your housing? No   Lack of transportation? No   Unable to get utilities (heat,electricity)? No         3/28/2024   Activities of Daily Living- Home Safety   Needs help with the following daily activites Housework   Safety concerns in the home None of the above         3/28/2024   Dental   Dentist two times every year? Yes         3/28/2024   Hearing Screening   Hearing concerns? None of the above         3/28/2024   Driving Risk Screening   Patient/family members have concerns about driving No         3/28/2024   General Alertness/Fatigue Screening   Have you been more tired than usual lately? No         3/28/2024   Urinary Incontinence Screening   Bothered by leaking urine in past 6 months No         3/28/2024   TB Screening   Were you born outside of the [place]? No         Today's PHQ-2 Score:       3/28/2024     7:12 AM   PHQ-2 ( 1999 [employer])   Q1: Little interest or pleasure in doing things 0   Q2: Feeling down, depressed or hopeless 0   PHQ-2 Score 0   Q1: Little interest or pleasure in doing things Not at all   Q2: Feeling down, depressed or hopeless Not at all   PHQ-2 Score 0           3/28/2024   Substance Use   Alcohol more than 3/day or more than 7/wk No   Do you have a current opioid prescription? No   How severe/bad is pain from 1 to 10? 0/10 (No Pain)   Do you use any other substances recreationally? No     Social History     Tobacco Use    Smoking status: Never     Passive exposure: Never    Smokeless tobacco: Never   Substance Use Topics    Alcohol use: No    Drug use: No           12/7/2023   LAST FHS-7 RESULTS   1st degree relative breast or ovarian cancer No   Any relative bilateral breast cancer No   Any male have breast cancer No   Any ONE woman have BOTH breast AND ovarian cancer No   Any woman with breast cancer before 50yrs No    2 or more relatives with breast AND/OR ovarian cancer No   2 or more relatives with breast AND/OR bowel cancer No        Mammogram Screening - After age 74- determine frequency with patient based on health status, life expectancy and patient goals          Reviewed and updated as needed this visit by Provider      Problems               Past Medical History:   Diagnosis Date    Back pain     Furuncle     Hyperlipidemia     Macular degeneration     Macular degeneration     Osteoarthritis     Osteoporosis     SVT (supraventricular tachycardia)     SVT (supraventricular tachycardia)      Past Surgical History:   Procedure Laterality Date    EP ABLATION SVT Right 6/26/2018    Procedure: EP Ablation Supra Ventricular;  Surgeon: Dany Renae MD;  Location: [facility] Lab;  Service:     ESOPHAGOSCOPY, GASTROSCOPY, DUODENOSCOPY (EGD), COMBINED N/A 4/4/2022    Procedure: ESOPHAGOGASTRODUODENOSCOPY with esophageal biopsy;  Surgeon: Ashlyn Acuna MD;  Location: [facility] GI    GASTROSTOMY, INSERT TUBE, COMBINED N/A 2/9/2021    Procedure: PLACEMENT OF GASTROSTOMY TUBE,;  Surgeon: Clinton Pavon MD;  [place] [facility]  Service: General    HC REMOVAL ADENOIDS,PRIMARY,<13 Y/O      Description: Adenoidectomy;  Recorded: 12/31/2007;    HC REMOVAL OF TONSILS,<13 Y/O      Description: Tonsillectomy;  Recorded: 12/31/2007;    HYSTERECTOMY      LAPAROTOMY EXPLORATORY N/A 2/9/2021    Procedure: EXPLORATORY LAPAROTOMY,  REPAIR GRAHM PATCH OF DUODENAL ULCER;  Surgeon: Clinton Pavon MD;  [place] [facility]  Service: General    OOPHORECTOMY      Z APPENDECTOMY      Description: Appendectomy;  Recorded: 12/31/2007;    [facility] TOTAL KNEE ARTHROPLASTY      Description: Total Knee Arthroplasty;  Recorded: 12/31/2007;    [facility] VAG HYST,RMV TUBE/OVARY,FIX ENTEROCE      Description: Vag Hysterect Uterus <250g Remov Both Ovaries Rep Enterocele;  Recorded: 12/31/2007;     BP Readings from Last 3 Encounters:    03/28/24 105/65   02/07/24 108/70   09/27/23 110/60    Wt Readings from Last 3 Encounters:   03/28/24 56.5 kg (124 lb 9.6 oz)   02/07/24 56.7 kg (125 lb)   09/27/23 57.6 kg (127 lb)                  Patient Active Problem List   Diagnosis    Fatigue    Temperature Intolerance To Cold   (Consistent)    Abnormal Blood Chemistry    Osteoarthritis Of The Ankle/Foot (Multiple Joints)    Mixed hyperlipidemia    Generalized Osteoarthritis Of Multiple Sites    Edema    Osteoporosis Senile    Vaginal Discharge (Symptom)    Back Pain    Automatic Atrial Tachycardia    Supraventricular tachycardia    Hypotension, unspecified hypotension type    Actinic keratosis    Hair thinning    Free intraperitoneal air    Duodenal ulcer with perforation (H)    Hiatal hernia    S/P exploratory laparotomy    Perforated duodenal ulcer (H)    Hypothyroidism    Furuncle    Lightheadedness    Personal history of other drug therapy     Past Surgical History:   Procedure Laterality Date    EP ABLATION SVT Right 6/26/2018    Procedure: EP Ablation Supra Ventricular;  Surgeon: Dany Renae MD;  Location: [facility];  Service:     ESOPHAGOSCOPY, GASTROSCOPY, DUODENOSCOPY (EGD), COMBINED N/A 4/4/2022    Procedure: ESOPHAGOGASTRODUODENOSCOPY with esophageal biopsy;  Surgeon: Ashlyn Acuna MD;  Location: [facility] GI    GASTROSTOMY, INSERT TUBE, COMBINED N/A 2/9/2021    Procedure: PLACEMENT OF GASTROSTOMY TUBE,;  Surgeon: Clinton Pavon MD;  Location: [facility];  Service: General    HC REMOVAL ADENOIDS,PRIMARY,<11 Y/O      Description: Adenoidectomy;  Recorded: 12/31/2007;    HC REMOVAL OF TONSILS,<11 Y/O      Description: Tonsillectomy;  Recorded: 12/31/2007;    HYSTERECTOMY      LAPAROTOMY EXPLORATORY N/A 2/9/2021    Procedure: EXPLORATORY LAPAROTOMY,  REPAIR GRAHM PATCH OF DUODENAL ULCER;  Surgeon: Clinton Pavon MD;  Location: [facility];  Service: General    OOPHORECTOMY      ZZC APPENDECTOMY       Description: Appendectomy;  Recorded: 12/31/2007;    [facility] TOTAL KNEE ARTHROPLASTY      Description: Total Knee Arthroplasty;  Recorded: 12/31/2007;    [facility] VAG HYST,RMV TUBE/OVARY,FIX ENTEROCE      Description: Vag Hysterect Uterus <250g Remov Both Ovaries Rep Enterocele;  Recorded: 12/31/2007;       Social History     Tobacco Use    Smoking status: Never     Passive exposure: Never    Smokeless tobacco: Never   Substance Use Topics    Alcohol use: No     Family History   Problem Relation Age of Onset    Cerebrovascular Disease Father          Current Outpatient Medications   Medication Sig Dispense Refill    calcium carbonate (OS-DOMONIQUE) 600 mg (1,500 mg) tablet [CALCIUM CARBONATE (OS-DOMONIQUE) 600 MG (1,500 MG) TABLET] Take 600 mg by mouth every other day.             Cholecalciferol (VITAMIN D3) 25 MCG (1000 UT) CAPS Take 1 capsule by mouth daily      cholecalciferol, vitamin D3, 5,000 unit Tab Take 125 mcg by mouth daily Takes every 5 days      furosemide (LASIX) 20 MG tablet Take 1 tablet (20 mg) by mouth daily 90 tablet 3    levothyroxine (SYNTHROID/LEVOTHROID) 25 MCG tablet [LEVOTHYROXINE (SYNTHROID, LEVOTHROID) 25 MCG TABLET] TAKE 1 TABLET DAILY AT 6:00 A.M. 90 tablet 3    metoprolol succinate ER (TOPROL XL) 50 MG 24 hr tablet Take 1 tablet (50 mg) by mouth at bedtime 90 tablet 3    OMEGA-3/DHA/EPA/FISH OIL (FISH OIL-OMEGA-3 FATTY ACIDS) 300-1,000 mg capsule [OMEGA-3/DHA/EPA/FISH OIL (FISH OIL-OMEGA-3 FATTY ACIDS) 300-1,000 MG CAPSULE] Take 2,000 mg by mouth daily.       pantoprazole (PROTONIX) 40 MG EC tablet Take 1 tablet (40 mg) by mouth 2 times daily 180 tablet 3    simvastatin (ZOCOR) 20 MG tablet [SIMVASTATIN (ZOCOR) 20 MG TABLET] TAKE 1 TABLET DAILY AT BEDTIME 90 tablet 3    VIT C/E/ZN/COPPR/LUTEIN/ZEAXAN (PRESERVISION AREDS 2 ORAL) [VIT C/E/ZN/COPPR/LUTEIN/ZEAXAN (PRESERVISION AREDS 2 ORAL)] Take 1 tablet by mouth 2 (two) times a day.       Current providers sharing in care for this patient include:  Patient  "Care Team:  Flex Bain MD as PCP - General (Family Medicine)  Flex Bain MD as Assigned PCP  Radha Contreras MD as Assigned Heart and Vascular Provider    The following health maintenance items are reviewed in Epic and correct as of today:  Health Maintenance   Topic Date Due    COVID-19 Vaccine (7 - 2023-24 season) 09/01/2023    LIPID  02/28/2025    TSH W/FREE T4 REFLEX  02/28/2025    MEDICARE ANNUAL WELLNESS VISIT  03/28/2025    ANNUAL REVIEW OF HM ORDERS  03/28/2025    FALL RISK ASSESSMENT  03/28/2025    ADVANCE CARE PLANNING  03/13/2028    DTAP/TDAP/TD IMMUNIZATION (3 - Td or Tdap) 06/29/2033    PHQ-2 (once per calendar year)  Completed    INFLUENZA VACCINE  Completed    Pneumococcal Vaccine: 65+ Years  Completed    ZOSTER IMMUNIZATION  Completed    RSV VACCINE (Pregnancy & 60+)  Completed    IPV IMMUNIZATION  Aged Out    HPV IMMUNIZATION  Aged Out    MENINGITIS IMMUNIZATION  Aged Out    RSV MONOCLONAL ANTIBODY  Aged Out         Review of Systems  Constitutional, neuro, ENT, endocrine, pulmonary, cardiac, gastrointestinal, genitourinary, musculoskeletal, integument and psychiatric systems are negative, except as otherwise noted.     Objective    Exam  /65 (BP Location: Left arm, Patient Position: Sitting, Cuff Size: Adult Regular)   Pulse 65   Resp 16   Ht 1.54 m (5' 0.63\")   Wt 56.5 kg (124 lb 9.6 oz)   SpO2 98%   BMI 23.83 kg/m     Estimated body mass index is 23.83 kg/m  as calculated from the following:    Height as of this encounter: 1.54 m (5' 0.63\").    Weight as of this encounter: 56.5 kg (124 lb 9.6 oz).    Physical Exam  GENERAL: alert and no distress  SKIN: no suspicious lesions or rashes        3/28/2024   Mini Cog   Mini-Cog Not Completed (choose reason) Patient declines       Patient declines, there are NO concerns for cognitive deficits.           Signed Electronically by: Flex Bain MD    "

## 2024-03-28 NOTE — TELEPHONE ENCOUNTER
Called [facility]. Scripts were sent to wrong mail order pharmacy. Pharmacy states that the preferred mail order location is the [facility] mail order in [place], [place].    They will place scripts on filed and initiate an internal transfer.    Called patient and left a detailed message with this info.

## 2024-03-28 NOTE — PATIENT INSTRUCTIONS
Preventive Care Advice   This is general advice given by our system to help you stay healthy. However, your care team may have specific advice just for you. Please talk to your care team about your preventive care needs.  Nutrition  Eat 5 or more servings of fruits and vegetables each day.  Try wheat bread, brown rice and whole grain pasta (instead of white bread, rice, and pasta).  Get enough calcium and vitamin D. Check the label on foods and aim for 100% of the RDA (recommended daily allowance).  Lifestyle  Exercise at least 150 minutes each week   (30 minutes a day, 5 days a week).  Do muscle strengthening activities 2 days a week. These help control your weight and prevent disease.  No smoking.  Wear sunscreen to prevent skin cancer.  Have a dental exam and cleaning every 6 months.  Yearly exams  See your health care team every year to talk about:  Any changes in your health.  Any medicines your care team has prescribed.  Preventive care, family planning, and ways to prevent chronic diseases.  Shots (vaccines)   HPV shots (up to age 26), if you've never had them before.  Hepatitis B shots (up to age 59), if you've never had them before.  COVID-19 shot: Get this shot when it's due.  Flu shot: Get a flu shot every year.  Tetanus shot: Get a tetanus shot every 10 years.  Pneumococcal, hepatitis A, and RSV shots: Ask your care team if you need these based on your risk.  Shingles shot (for age 50 and up).  General health tests  Diabetes screening:  Starting at age 35, Get screened for diabetes at least every 3 years.  If you are younger than age 35, ask your care team if you should be screened for diabetes.  Cholesterol test: At age 39, start having a cholesterol test every 5 years, or more often if advised.  Bone density scan (DEXA): At age 50, ask your care team if you should have this scan for osteoporosis (brittle bones).  Hepatitis C: Get tested at least once in your life.  STIs (sexually transmitted  infections)  Before age 24: Ask your care team if you should be screened for STIs.  After age 24: Get screened for STIs if you're at risk. You are at risk for STIs (including HIV) if:  You are sexually active with more than one person.  You don't use condoms every time.  You or a partner was diagnosed with a sexually transmitted infection.  If you are at risk for HIV, ask about PrEP medicine to prevent HIV.  Get tested for HIV at least once in your life, whether you are at risk for HIV or not.  Cancer screening tests  Cervical cancer screening: If you have a cervix, begin getting regular cervical cancer screening tests at age 21. Most people who have regular screenings with normal results can stop after age 65. Talk about this with your provider.  Breast cancer scan (mammogram): If you've ever had breasts, begin having regular mammograms starting at age 40. This is a scan to check for breast cancer.  Colon cancer screening: It is important to start screening for colon cancer at age 45.  Have a colonoscopy test every 10 years (or more often if you're at risk) Or, ask your provider about stool tests like a FIT test every year or Cologuard test every 3 years.  To learn more about your testing options, visit: https://[email or web address]/372939.pdf.  For help making a decision, visit: https://bit.ly/tq22662.  Prostate cancer screening test: If you have a prostate and are age 55 to 69, ask your provider if you would benefit from a yearly prostate cancer screening test.  Lung cancer screening: If you are a current or former smoker age 50 to 80, ask your care team if ongoing lung cancer screenings are right for you.  For informational purposes only. Not to replace the advice of your health care provider. Copyright   2023 [employer]. All rights reserved. Clinically reviewed by the [facility]. [employer] 448450 - REV 01/24.    Learning About Activities of Daily Living  What are activities  of daily living?     Activities of daily living (ADLs) are the basic self-care tasks you do every day. These include eating, bathing, dressing, and moving around.  As you age, and if you have health problems, you may find that it's harder to do some of these tasks. If so, your doctor can suggest ideas that may help.  To measure what kind of help you may need, your doctor will ask how well you are able to do ADLs. Let your doctor know if there are any tasks that you are having trouble doing. This is an important first step to getting help. And when you have the help you need, you can stay as independent as possible.  How will a doctor assess your ADLs?  Asking about ADLs is part of a routine health checkup your doctor will likely do as you age. Your health check might be done in a doctor's office, in your home, or at a hospital. The goal is to find out if you are having any problems that could make it hard to care for yourself or that make it unsafe for you to be on your own.  To measure your ADLs, your doctor will ask how hard it is for you to do routine tasks. Your doctor may also want to know if you have changed the way you do a task because of a health problem. Your doctor may watch how you:  Walk back and forth.  Keep your balance while you stand or walk.  Move from sitting to standing or from a bed to a chair.  Button or unbutton a shirt or sweater.  Remove and put on your shoes.  It's common to feel a little worried or anxious if you find you can't do all the things you used to be able to do. Talking with your doctor about ADLs is a way to make sure you're as safe as possible and able to care for yourself as well as you can. You may want to bring a caregiver, friend, or family member to your checkup. They can help you talk to your doctor.  Follow-up care is a key part of your treatment and safety. Be sure to make and go to all appointments, and call your doctor if you are having problems. It's also a good idea  to know your test results and keep a list of the medicines you take.  Current as of: October 24, 2023               Content Version: 14.0    8818-1182 [employer].   Care instructions adapted under license by your healthcare professional. If you have questions about a medical condition or this instruction, always ask your healthcare professional. [employer] disclaims any warranty or liability for your use of this information.      Preventing Falls: Care Instructions  Injuries and health problems such as trouble walking or poor eyesight can increase your risk of falling. So can some medicines. But there are things you can do to help prevent falls. You can exercise to get stronger. You can also arrange your home to make it safer.    Talk to your doctor about the medicines you take. Ask if any of them increase the risk of falls and whether they can be changed or stopped.   Try to exercise regularly. It can help improve your strength and balance. This can help lower your risk of falling.     Practice fall safety and prevention.    Wear low-heeled shoes that fit well and give your feet good support. Talk to your doctor if you have foot problems that make this hard.  Carry a cellphone or wear a medical alert device that you can use to call for help.  Use stepladders instead of chairs to reach high objects. Don't climb if you're at risk for falls. Ask for help, if needed.  Wear the correct eyeglasses, if you need them.    Make your home safer.    Remove rugs, cords, clutter, and furniture from walkways.  Keep your house well lit. Use night-lights in hallways and bathrooms.  Install and use sturdy handrails on stairways.  Wear nonskid footwear, even inside. Don't walk barefoot or in socks without shoes.    Be safe outside.    Use handrails, curb cuts, and ramps whenever possible.  Keep your hands free by using a shoulder bag or backpack.  Try to walk in well-lit areas. Watch out for uneven ground,  "changes in pavement, and debris.  Be careful in the winter. Walk on the grass or gravel when sidewalks are slippery. Use de-icer on steps and walkways. Add non-slip devices to shoes.    Put grab bars and nonskid mats in your shower or tub and near the toilet. Try to use a shower chair or bath bench when bathing.   Get into a tub or shower by putting in your weaker leg first. Get out with your strong side first. Have a phone or medical alert device in the bathroom with you.   Where can you learn more?  Go to https://www.Dasdak.CouponCabin/patiented  Enter G117 in the search box to learn more about \"Preventing Falls: Care Instructions.\"  Current as of: July 17, 2023               Content Version: 14.0    3017-7549 [employer].   Care instructions adapted under license by your healthcare professional. If you have questions about a medical condition or this instruction, always ask your healthcare professional. [employer] disclaims any warranty or liability for your use of this information.      Learning About Stress  What is stress?     Stress is your body's response to a hard situation. Your body can have a physical, emotional, or mental response. Stress is a fact of life for most people, and it affects everyone differently. What causes stress for you may not be stressful for someone else.  A lot of things can cause stress. You may feel stress when you go on a job interview, take a test, or run a race. This kind of short-term stress is normal and even useful. It can help you if you need to work hard or react quickly. For example, stress can help you finish an important job on time.  Long-term stress is caused by ongoing stressful situations or events. Examples of long-term stress include long-term health problems, ongoing problems at work, or conflicts in your family. Long-term stress can harm your health.  How does stress affect your health?  When you are stressed, your body responds as though " you are in danger. It makes hormones that speed up your heart, make you breathe faster, and give you a burst of energy. This is called the fight-or-flight stress response. If the stress is over quickly, your body goes back to normal and no harm is done.  But if stress happens too often or lasts too long, it can have bad effects. Long-term stress can make you more likely to get sick, and it can make symptoms of some diseases worse. If you tense up when you are stressed, you may develop neck, shoulder, or low back pain. Stress is linked to high blood pressure and heart disease.  Stress also harms your emotional health. It can make you [place], tense, or depressed. Your relationships may suffer, and you may not do well at work or school.  What can you do to manage stress?  You can try these things to help manage stress:   Do something active. Exercise or activity can help reduce stress. Walking is a great way to get started. Even everyday activities such as housecleaning or yard work can help.  Try yoga or shimon chi. These techniques combine exercise and meditation. You may need some training at first to learn them.  Do something you enjoy. For example, listen to music or go to a movie. Practice your hobby or do volunteer work.  Meditate. This can help you relax, because you are not worrying about what happened before or what may happen in the future.  Do guided imagery. Imagine yourself in any setting that helps you feel calm. You can use online videos, books, or a teacher to guide you.  Do breathing exercises. For example:  From a standing position, bend forward from the waist with your knees slightly bent. Let your arms dangle close to the floor.  Breathe in slowly and deeply as you return to a standing position. Roll up slowly and lift your head last.  Hold your breath for just a few seconds in the standing position.  Breathe out slowly and bend forward from the waist.  Let your feelings out. Talk, laugh, cry, and  "express anger when you need to. Talking with supportive friends or family, a counselor, or a vanna leader about your feelings is a healthy way to relieve stress. Avoid discussing your feelings with people who make you feel worse.  Write. It may help to write about things that are bothering you. This helps you find out how much stress you feel and what is causing it. When you know this, you can find better ways to cope.  What can you do to prevent stress?  You might try some of these things to help prevent stress:  Manage your time. This helps you find time to do the things you want and need to do.  Get enough sleep. Your body recovers from the stresses of the day while you are sleeping.  Get support. Your family, friends, and community can make a difference in how you experience stress.  Limit your news feed. Avoid or limit time on social media or news that may make you feel stressed.  Do something active. Exercise or activity can help reduce stress. Walking is a great way to get started.  Where can you learn more?  Go to https://www.StyleFactory.net/patiented  Enter N032 in the search box to learn more about \"Learning About Stress.\"  Current as of: October 24, 2023               Content Version: 14.0    5892-3854 [employer].   Care instructions adapted under license by your healthcare professional. If you have questions about a medical condition or this instruction, always ask your healthcare professional. [employer] disclaims any warranty or liability for your use of this information.      "

## 2024-03-29 ENCOUNTER — TELEPHONE (OUTPATIENT)
Dept: FAMILY MEDICINE | Facility: CLINIC | Age: 89
End: 2024-03-29
Payer: COMMERCIAL

## 2024-03-29 NOTE — TELEPHONE ENCOUNTER
----- Message from Flex Bain MD sent at 3/29/2024  7:54 AM CDT -----  Please inform patient that her vitamin D remains borderline high.  She is currently taking 5000 unit of vitamin D every 5 days.  She can reduce it to taking it once a week.  Alternatively, she can hold off on it for a month or so.    Please call patient as sometimes she is unable to access her chart.    Flex Bain MD

## 2024-03-29 NOTE — TELEPHONE ENCOUNTER
Informed patient of message below from provider.   Patient will hold for a month or 2 than take once a week.

## 2024-06-26 ENCOUNTER — OFFICE VISIT (OUTPATIENT)
Dept: FAMILY MEDICINE | Facility: CLINIC | Age: 89
End: 2024-06-26
Payer: COMMERCIAL

## 2024-06-26 VITALS
HEART RATE: 63 BPM | OXYGEN SATURATION: 98 % | RESPIRATION RATE: 20 BRPM | DIASTOLIC BLOOD PRESSURE: 81 MMHG | SYSTOLIC BLOOD PRESSURE: 121 MMHG | TEMPERATURE: 98.4 F

## 2024-06-26 DIAGNOSIS — R23.4 SCAB: ICD-10-CM

## 2024-06-26 DIAGNOSIS — R35.0 URINARY FREQUENCY: Primary | ICD-10-CM

## 2024-06-26 LAB
ALBUMIN UR-MCNC: NEGATIVE MG/DL
APPEARANCE UR: CLEAR
BILIRUB UR QL STRIP: NEGATIVE
COLOR UR AUTO: YELLOW
GLUCOSE UR STRIP-MCNC: NEGATIVE MG/DL
HGB UR QL STRIP: NEGATIVE
KETONES UR STRIP-MCNC: 40 MG/DL
LEUKOCYTE ESTERASE UR QL STRIP: NEGATIVE
NITRATE UR QL: NEGATIVE
PH UR STRIP: 6 [PH] (ref 5–8)
SP GR UR STRIP: <=1.005 (ref 1–1.03)
UROBILINOGEN UR STRIP-ACNC: 0.2 E.U./DL

## 2024-06-26 PROCEDURE — 99213 OFFICE O/P EST LOW 20 MIN: CPT | Performed by: STUDENT IN AN ORGANIZED HEALTH CARE EDUCATION/TRAINING PROGRAM

## 2024-06-26 PROCEDURE — 81003 URINALYSIS AUTO W/O SCOPE: CPT | Performed by: STUDENT IN AN ORGANIZED HEALTH CARE EDUCATION/TRAINING PROGRAM

## 2024-06-26 RX ORDER — PHENAZOPYRIDINE HYDROCHLORIDE 100 MG/1
100 TABLET, FILM COATED ORAL 3 TIMES DAILY PRN
Qty: 9 TABLET | Refills: 0 | Status: SHIPPED | OUTPATIENT
Start: 2024-06-26 | End: 2024-06-29

## 2024-06-26 NOTE — PATIENT INSTRUCTIONS
Urine culture is pending. If antibiotics are indicated, someone will call you to prescribe them    Return to urgent care if you develop a fever, chills, lower back pain, vomiting.    Take Tylenol as needed for pain    Sent pyridium    Stay hydrated    If the scab does not heal in 3 weeks, follow up with PCP or dermatology

## 2024-06-26 NOTE — PROGRESS NOTES
"Assessment & Plan     Urinary frequency  - UA Macroscopic with reflex to Microscopic and Culture - Lab Collect  - Urine Culture Aerobic Bacterial - lab collect  - phenazopyridine (PYRIDIUM) 100 MG tablet  Dispense: 9 tablet; Refill: 0  - hydration, rest    Scab  R lower leg \"for a few months.\" No h/o PAD or DM. Unclear if nonhealing ulceration vs pt picking at the scab and not allowing full healing. No signs of infection, no fluctuance, tenderness. Advised her to avoid picking at scab for 3-4 weeks and still if no healing, visit PCP or dermatology.      Return for if symptoms do not improve in 2-3 days.    Eugenia Barraza, DO  she/her  [facility]    Subjective     Maria D Moore is a 89 year old female who presents to clinic today for the following health issues:    HPI    2d urinary frequency, urgency. No burning, no change in vaginal discharge  No suprapubic pain, no fever, new lower back pain, vomiting  Not sexually active    R ankle bug bite  Its been there a few months and doesn't heal  Endorses picking at the scab  Thinks it feels \"like a jellybean\" underneath and doesn't know if it's something she should worry about    Past Medical History:   Diagnosis Date    Back pain     Furuncle     Hyperlipidemia     Macular degeneration     Macular degeneration     Osteoarthritis     Osteoporosis     SVT (supraventricular tachycardia)     SVT (supraventricular tachycardia)        Allergies   Allergen Reactions    Cephalexin Shortness Of Breath    Erythromycin Base [Erythromycin] Rash    Penicillins Unknown    Sulfa (Sulfonamide Antibiotics) [Sulfa Antibiotics] Rash    Vancomycin Itching     Current Outpatient Medications   Medication Sig Dispense Refill    calcium carbonate (OS-DOMONIQUE) 600 mg (1,500 mg) tablet [CALCIUM CARBONATE (OS-DOMONIQUE) 600 MG (1,500 MG) TABLET] Take 600 mg by mouth every other day.             Cholecalciferol (VITAMIN D3) 25 MCG (1000 UT) CAPS Take 1 capsule by mouth daily      " cholecalciferol, vitamin D3, 5,000 unit Tab Take 125 mcg by mouth daily Takes every 5 days      furosemide (LASIX) 20 MG tablet Take 1 tablet (20 mg) by mouth daily 90 tablet 3    levothyroxine (SYNTHROID/LEVOTHROID) 25 MCG tablet [LEVOTHYROXINE (SYNTHROID, LEVOTHROID) 25 MCG TABLET] TAKE 1 TABLET DAILY AT 6:00 A.M. 90 tablet 3    metoprolol succinate ER (TOPROL XL) 50 MG 24 hr tablet Take 1 tablet (50 mg) by mouth at bedtime 90 tablet 3    OMEGA-3/DHA/EPA/FISH OIL (FISH OIL-OMEGA-3 FATTY ACIDS) 300-1,000 mg capsule [OMEGA-3/DHA/EPA/FISH OIL (FISH OIL-OMEGA-3 FATTY ACIDS) 300-1,000 MG CAPSULE] Take 2,000 mg by mouth daily.       pantoprazole (PROTONIX) 40 MG EC tablet Take 1 tablet (40 mg) by mouth 2 times daily 180 tablet 3    phenazopyridine (PYRIDIUM) 100 MG tablet Take 1 tablet (100 mg) by mouth 3 times daily as needed for urinary tract discomfort 9 tablet 0    simvastatin (ZOCOR) 20 MG tablet [SIMVASTATIN (ZOCOR) 20 MG TABLET] TAKE 1 TABLET DAILY AT BEDTIME 90 tablet 3    VIT C/E/ZN/COPPR/LUTEIN/ZEAXAN (PRESERVISION AREDS 2 ORAL) [VIT C/E/ZN/COPPR/LUTEIN/ZEAXAN (PRESERVISION AREDS 2 ORAL)] Take 1 tablet by mouth 2 (two) times a day.       Current Facility-Administered Medications   Medication Dose Route Frequency Provider Last Rate Last Admin    denosumab (PROLIA) injection 60 mg  60 mg Subcutaneous Q6 Months Deisy Mckeon, NP   60 mg at 03/05/24 0853          Review of Systems  Constitutional, HEENT, cardiovascular, pulmonary, gi and gu systems are negative, except as otherwise noted.      Objective    /81 (BP Location: Right arm, Patient Position: Sitting, Cuff Size: Adult Small)   Pulse 63   Temp 98.4  F (36.9  C) (Oral)   Resp 20   SpO2 98%     Physical Exam   General: Alert and oriented, in no acute distress.  Eyes: Extra-ocular muscles intact, pupils equal and reactive.  ENT: Speech intact, nasal passages open, no hearing impairment noted.  CV: No cyanosis or pallor, warm and well  perfused.  Respiratory: No respiratory distress, no accessory muscle use.  Neuro: Gait and station normal, comprehension intact. Gross and fine motor skills intact.   Psychiatric: Mood and affect appear normal.   Extremities: Warm, able to move all four extremities at will.  Skin: small scab on R lower outer leg without underlying fluctuance. Non tender to touch. No warmth          Results for orders placed or performed in visit on 06/26/24   UA Macroscopic with reflex to Microscopic and Culture - Lab Collect     Status: Abnormal    Specimen: Urine, Clean Catch   Result Value Ref Range    Color Urine Yellow Colorless, Straw, Light Yellow, Yellow    Appearance Urine Clear Clear    Glucose Urine Negative Negative mg/dL    Bilirubin Urine Negative Negative    Ketones Urine 40 (A) Negative mg/dL    Specific Gravity Urine <=1.005 1.005 - 1.030    Blood Urine Negative Negative    pH Urine 6.0 5.0 - 8.0    Protein Albumin Urine Negative Negative mg/dL    Urobilinogen Urine 0.2 0.2, 1.0 E.U./dL    Nitrite Urine Negative Negative    Leukocyte Esterase Urine Negative Negative    Narrative    Microscopic not indicated           The use of Dragon/[employer] dictation services may have been used to construct the content in this note; any grammatical or spelling errors are non-intentional. Please contact the author of this note directly if you are in need of any clarification.

## 2024-09-12 DIAGNOSIS — M81.0 SENILE OSTEOPOROSIS: Primary | ICD-10-CM

## 2024-09-17 ENCOUNTER — OFFICE VISIT (OUTPATIENT)
Dept: CARDIOLOGY | Facility: CLINIC | Age: 89
End: 2024-09-17
Attending: INTERNAL MEDICINE
Payer: COMMERCIAL

## 2024-09-17 ENCOUNTER — OFFICE VISIT (OUTPATIENT)
Dept: FAMILY MEDICINE | Facility: CLINIC | Age: 89
End: 2024-09-17
Payer: COMMERCIAL

## 2024-09-17 VITALS
WEIGHT: 117 LBS | HEART RATE: 62 BPM | BODY MASS INDEX: 22.09 KG/M2 | DIASTOLIC BLOOD PRESSURE: 69 MMHG | OXYGEN SATURATION: 97 % | HEIGHT: 61 IN | SYSTOLIC BLOOD PRESSURE: 109 MMHG | RESPIRATION RATE: 16 BRPM

## 2024-09-17 VITALS
BODY MASS INDEX: 24.19 KG/M2 | DIASTOLIC BLOOD PRESSURE: 60 MMHG | TEMPERATURE: 98.1 F | RESPIRATION RATE: 20 BRPM | HEIGHT: 59 IN | WEIGHT: 120 LBS | HEART RATE: 81 BPM | OXYGEN SATURATION: 96 % | SYSTOLIC BLOOD PRESSURE: 96 MMHG

## 2024-09-17 DIAGNOSIS — Z01.818 PREOPERATIVE EXAMINATION: Primary | ICD-10-CM

## 2024-09-17 DIAGNOSIS — G56.21 CUBITAL TUNNEL SYNDROME ON RIGHT: ICD-10-CM

## 2024-09-17 DIAGNOSIS — I47.10 SVT (SUPRAVENTRICULAR TACHYCARDIA) (H): ICD-10-CM

## 2024-09-17 DIAGNOSIS — G56.01 CARPAL TUNNEL SYNDROME OF RIGHT WRIST: ICD-10-CM

## 2024-09-17 DIAGNOSIS — M81.0 OSTEOPOROSIS, UNSPECIFIED OSTEOPOROSIS TYPE, UNSPECIFIED PATHOLOGICAL FRACTURE PRESENCE: ICD-10-CM

## 2024-09-17 DIAGNOSIS — I34.0 MITRAL VALVE INSUFFICIENCY, UNSPECIFIED ETIOLOGY: ICD-10-CM

## 2024-09-17 DIAGNOSIS — L57.0 ACTINIC KERATOSIS: ICD-10-CM

## 2024-09-17 DIAGNOSIS — R60.0 BILATERAL LOWER EXTREMITY EDEMA: Primary | ICD-10-CM

## 2024-09-17 PROCEDURE — 99214 OFFICE O/P EST MOD 30 MIN: CPT | Performed by: INTERNAL MEDICINE

## 2024-09-17 PROCEDURE — 99214 OFFICE O/P EST MOD 30 MIN: CPT | Performed by: FAMILY MEDICINE

## 2024-09-17 RX ORDER — FUROSEMIDE 40 MG
40 TABLET ORAL DAILY
Qty: 90 TABLET | Refills: 3 | Status: SHIPPED | OUTPATIENT
Start: 2024-09-17

## 2024-09-17 NOTE — PROGRESS NOTES
[facility] [address], [place] [address]   [email or web address]   OFFICE: [phone]            Impression and Plan     1. Supraventricular tachycardia. Maria D underwent successful ablation of slow AV node pathway with cryoablation 26 June 2018.  This denies any subjective recurrence of SVT since my last visit with her.       2.  Mitral insufficiency.  This was felt trace-mild on last echocardiogram 27 July 2023     3.  Bilateral lower extremity edema.  Maria D has noted some bothersome lower extremity edema, albeit fairly mild.  She has chronic edema involving the left ankle after suffering an injury in her teenage years.  She reports it is somewhat worse toward the end of the day particularly if she has been up on her feet for extended periods of time.  Her edema, however, has worsened recently.  Plan:  Will increase furosemide from 20 mg to 40 mg daily though indicated that if efficacious we could try going back to the 20 mg.  Will obtain a basic metabolic panel in approximately 5 days to reassess electrolytes in light of increasing dose of furosemide.     Tentatively plan on follow-up in 4 months primarily to follow-up on lower extremity edema though Maria D was instructed to call with updates as needed.      35 minutes spent reviewing prior records (including documentation, laboratory studies, cardiac testing/imaging), interview with patient along with physical exam, planning, and subsequent documentation/crafting of note).           History of Present Illness    Once again I would like to thank you again for asking me to participate in the care of your patient, Maria D Moore.  As you know, but to reiterate for my own records, Maria D Moore is a 89 year old female with history of SVT.  Maria D underwent successful ablation of slow AV node pathway with cryoablation 26 June 2018.     In follow-up today, Maria D denies any recurrent subjective SVT  since my last visit with her.  She denies any chest pain.  Her breathing has been comfortable.       She still has some lower extremity edema although this has increased somewhat as of late.  Has been somewhat uncomfortable for her.    Further review of systems is otherwise negative/noncontributory (medical record and 13 point review of systems reviewed as well and pertinent positives noted).         Cardiac Diagnostics      Echocardiogram 27 July 2023:  Normal left ventricular size and systolic performance with ejection fraction of 60-65%.  Mild-moderate tricuspid insufficiency.  Normal right ventricular size and systolic performance.  Normal atrial dimensions.    Echocardiogram 28 April 2022:  Normal left ventricular size and systolic performance with ejection fraction of 60 to 65%.  Trace aortic insufficiency.  Mild-moderate mitral insufficiency.  Moderate tricuspid insufficiency.  Normal right ventricular size and systolic performance.  Normal atrial dimensions.    Echocardiogram 1 June 2018 (personally reviewed):  Normal left ventricular size and systolic performance with a visually estimated ejection fraction of 65%.   There is moderate mitral insufficiency.   There is moderate tricuspid insufficiency.   Normal right ventricular size and systolic performance.   There is moderate bi-atrial enlargement.  When compared to the prior real-time echocardiogram dated 14 August 2010, the degree of mitral insufficiency has increased from mild to now moderate.    Echocardiogram 14 August 2010:  Normal left ventricular size and systolic performance with ejection fraction of 55%.  Moderate tricuspid insufficiency.  Mild left atrial enlargement.    Nuclear perfusion imaging study 1 August 2018:  Small reversible defect in the anterior/apical segments.  Significant splanchnic artifact reducing sensitivity of finding.  Normal left ventricular systolic performance with ejection fraction of 81%.    Event recorder 6 April 2016  "through 2 May 2016:  Largely normal 30-day event monitor.  Symptoms correlating with sinus rhythm and occasional atrial ectopy.    Holter monitor 2 March 2015:  Moderate amount of atrial ectopy is present but most of the PACs are singular or short runs at modest rates.   No atrial fibrillation identified.                Physical Examination       /69 (BP Location: Left arm, Patient Position: Sitting, Cuff Size: Adult Regular)   Pulse 62   Resp 16   Ht 1.549 m (5' 1\")   Wt 53.1 kg (117 lb)   SpO2 97%   BMI 22.11 kg/m          Wt Readings from Last 3 Encounters:   09/17/24 53.1 kg (117 lb)   03/28/24 56.5 kg (124 lb 9.6 oz)   02/07/24 56.7 kg (125 lb)       The patient is alert and oriented times three. Sclerae are anicteric. Mucosal membranes are moist. Jugular venous pressure is normal. No significant adenopathy/thyromegally appreciated. Lungs are clear with good expansion. On cardiovascular exam, the patient has a regular S1 and S2. Abdomen is soft and non-tender. Extremities reveal no clubbing, cyanosis, with mild-moderate bilateral lower extremity edema.         Family History/Social History/Risk Factors   Patient does not smoke.  Family history reviewed, and family history includes Cerebrovascular Disease in her father.          Medical History  Surgical History Family History Social History   Past Medical History:   Diagnosis Date    Back pain     Furuncle     Hyperlipidemia     Macular degeneration     Macular degeneration     Osteoarthritis     Osteoporosis     SVT (supraventricular tachycardia) (H24)     SVT (supraventricular tachycardia) (H24)      Past Surgical History:   Procedure Laterality Date    EP ABLATION SVT Right 6/26/2018    Procedure: EP Ablation Supra Ventricular;  Surgeon: Dany Renae MD;  Location: [facility];  Service:     ESOPHAGOSCOPY, GASTROSCOPY, DUODENOSCOPY (EGD), COMBINED N/A 4/4/2022    Procedure: ESOPHAGOGASTRODUODENOSCOPY with esophageal biopsy;  Surgeon: " Ashlyn Acuna MD;  Location: [facility] GI    GASTROSTOMY, INSERT TUBE, COMBINED N/A 2/9/2021    Procedure: PLACEMENT OF GASTROSTOMY TUBE,;  Surgeon: Clinton Pavon MD;  Location: [facility] OR;  Service: General    HC REMOVAL ADENOIDS,PRIMARY,<13 Y/O      Description: Adenoidectomy;  Recorded: 12/31/2007;    HC REMOVAL OF TONSILS,<13 Y/O      Description: Tonsillectomy;  Recorded: 12/31/2007;    HYSTERECTOMY      LAPAROTOMY EXPLORATORY N/A 2/9/2021    Procedure: EXPLORATORY LAPAROTOMY,  REPAIR GRAHM PATCH OF DUODENAL ULCER;  Surgeon: Clinton Pavon MD;  [place] [facility];  Service: General    OOPHORECTOMY      ZC APPENDECTOMY      Description: Appendectomy;  Recorded: 12/31/2007;    Z TOTAL KNEE ARTHROPLASTY      Description: Total Knee Arthroplasty;  Recorded: 12/31/2007;    ZC VAG HYST,RMV TUBE/OVARY,FIX ENTEROCE      Description: Vag Hysterect Uterus <250g Remov Both Ovaries Rep Enterocele;  Recorded: 12/31/2007;     Family History   Problem Relation Age of Onset    Cerebrovascular Disease Father         Social History     Socioeconomic History    Marital status:      Spouse name: Not on file    Number of children: Not on file    Years of education: Not on file    Highest education level: Not on file   Occupational History    Not on file   Tobacco Use    Smoking status: Never     Passive exposure: Never    Smokeless tobacco: Never   Substance and Sexual Activity    Alcohol use: No    Drug use: No    Sexual activity: Not on file   Other Topics Concern    Parent/sibling w/ CABG, MI or angioplasty before 65F 55M? 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  for troubleshooting at current residence     Social Determinants of Health     Financial Resource Strain: Low Risk  (3/28/2024)    Financial Resource Strain      Within the past 12 months, have you or your family members you live with been unable to get utilities (heat, electricity) when it was really needed?: No   Food Insecurity: Low Risk  (3/28/2024)    Food Insecurity     Within the past 12 months, did you worry that your food would run out before you got money to buy more?: No     Within the past 12 months, did the food you bought just not last and you didn t have money to get more?: No   Transportation Needs: Low Risk  (3/28/2024)    Transportation Needs     Within the past 12 months, has lack of transportation kept you from medical appointments, getting your medicines, non-medical meetings or appointments, work, or from getting things that you need?: No   Physical Activity: Unknown (3/28/2024)    Exercise Vital Sign     Days of Exercise per Week: 4 days     Minutes of Exercise per Session: Not on file   Stress: No Stress Concern Present (3/28/2024)    [employer] - Occupational Stress Questionnaire     Feeling of Stress : Only a little   Social Connections: Unknown (3/28/2024)    Social Connection and Isolation Panel [NHANES]     Frequency of Communication with Friends and Family: Not on file     Frequency of Social Gatherings with Friends and Family: Three times a week     Attends [employer] Services: Not on file     Active Member of Clubs or Organizations: Not on file     Attends Club or Organization Meetings: Not on file     Marital Status: Not on file   Interpersonal Safety: Low Risk  (3/28/2024)    Interpersonal Safety     Do you feel physically and emotionally safe where you currently live?: Yes     Within the past 12 months, have you been hit, slapped, kicked or otherwise physically hurt by someone?: No     Within the past 12 months, have you been humiliated or emotionally abused in other ways by your partner or ex-partner?: No   Housing Stability: Low Risk  (3/28/2024)    Housing Stability     Do you have housing? : Yes     Are you  worried about losing your housing?: No           Medications  Allergies   Current Outpatient Medications   Medication Sig Dispense Refill    calcium carbonate (OS-DOMONIQUE) 600 mg (1,500 mg) tablet [CALCIUM CARBONATE (OS-DOMONIQUE) 600 MG (1,500 MG) TABLET] Take 600 mg by mouth every other day.             Cholecalciferol (VITAMIN D3) 25 MCG (1000 UT) CAPS Take 1 capsule by mouth daily      furosemide (LASIX) 40 MG tablet Take 1 tablet (40 mg) by mouth daily. 90 tablet 3    levothyroxine (SYNTHROID/LEVOTHROID) 25 MCG tablet [LEVOTHYROXINE (SYNTHROID, LEVOTHROID) 25 MCG TABLET] TAKE 1 TABLET DAILY AT 6:00 A.M. 90 tablet 3    metoprolol succinate ER (TOPROL XL) 50 MG 24 hr tablet Take 1 tablet (50 mg) by mouth at bedtime 90 tablet 3    OMEGA-3/DHA/EPA/FISH OIL (FISH OIL-OMEGA-3 FATTY ACIDS) 300-1,000 mg capsule [OMEGA-3/DHA/EPA/FISH OIL (FISH OIL-OMEGA-3 FATTY ACIDS) 300-1,000 MG CAPSULE] Take 2,000 mg by mouth daily.       pantoprazole (PROTONIX) 40 MG EC tablet Take 1 tablet (40 mg) by mouth 2 times daily 180 tablet 3    simvastatin (ZOCOR) 20 MG tablet [SIMVASTATIN (ZOCOR) 20 MG TABLET] TAKE 1 TABLET DAILY AT BEDTIME 90 tablet 3    VIT C/E/ZN/COPPR/LUTEIN/ZEAXAN (PRESERVISION AREDS 2 ORAL) [VIT C/E/ZN/COPPR/LUTEIN/ZEAXAN (PRESERVISION AREDS 2 ORAL)] Take 1 tablet by mouth 2 (two) times a day.         Allergies   Allergen Reactions    Cephalexin Shortness Of Breath    Erythromycin Base [Erythromycin] Rash    Penicillins Unknown    Sulfa (Sulfonamide Antibiotics) [Sulfa Antibiotics] Rash    Vancomycin Itching          Lab Results    Chemistry/lipid CBC Cardiac Enzymes/BNP/TSH/INR   Recent Labs   Lab Test 02/29/24  0946   CHOL 173   HDL 87   LDL 74   TRIG 62     Recent Labs   Lab Test 02/29/24  0946 03/13/23  1023 03/31/22  0802   LDL 74 90 89     Recent Labs   Lab Test 03/28/24  0738      POTASSIUM 4.1   CHLORIDE 104   CO2 25   GLC 87   BUN 19.7   CR 0.97*   GFRESTIMATED 56*   DOMONIQUE 10.3*     Recent Labs   Lab Test  "03/28/24  0738 02/29/24  0946 07/28/23  0702   CR 0.97* 1.05* 0.96*     No results for input(s): \"A1C\" in the last 54759 hours.       Recent Labs   Lab Test 03/28/24  0737   WBC 6.7   HGB 12.8   HCT 38.4   MCV 93        Recent Labs   Lab Test 03/28/24  0737 03/13/23  1023 03/31/22  0802   HGB 12.8 13.3 13.6    Recent Labs   Lab Test 06/01/18  0706 05/31/18  2305 05/31/18  1727   TROPONINI 0.31* 0.41* 0.28     Recent Labs   Lab Test 04/08/21  1036 02/10/21  0438   * 518*     Recent Labs   Lab Test 02/29/24  0946   TSH 2.17     Recent Labs   Lab Test 06/01/18  0011 05/31/18  1225   INR 1.16* 1.12*          Medications  Allergies   Current Outpatient Medications   Medication Sig Dispense Refill    calcium carbonate (OS-DOMONIQUE) 600 mg (1,500 mg) tablet [CALCIUM CARBONATE (OS-DOMONIQUE) 600 MG (1,500 MG) TABLET] Take 600 mg by mouth every other day.             Cholecalciferol (VITAMIN D3) 25 MCG (1000 UT) CAPS Take 1 capsule by mouth daily      furosemide (LASIX) 40 MG tablet Take 1 tablet (40 mg) by mouth daily. 90 tablet 3    levothyroxine (SYNTHROID/LEVOTHROID) 25 MCG tablet [LEVOTHYROXINE (SYNTHROID, LEVOTHROID) 25 MCG TABLET] TAKE 1 TABLET DAILY AT 6:00 A.M. 90 tablet 3    metoprolol succinate ER (TOPROL XL) 50 MG 24 hr tablet Take 1 tablet (50 mg) by mouth at bedtime 90 tablet 3    OMEGA-3/DHA/EPA/FISH OIL (FISH OIL-OMEGA-3 FATTY ACIDS) 300-1,000 mg capsule [OMEGA-3/DHA/EPA/FISH OIL (FISH OIL-OMEGA-3 FATTY ACIDS) 300-1,000 MG CAPSULE] Take 2,000 mg by mouth daily.       pantoprazole (PROTONIX) 40 MG EC tablet Take 1 tablet (40 mg) by mouth 2 times daily 180 tablet 3    simvastatin (ZOCOR) 20 MG tablet [SIMVASTATIN (ZOCOR) 20 MG TABLET] TAKE 1 TABLET DAILY AT BEDTIME 90 tablet 3    VIT C/E/ZN/COPPR/LUTEIN/ZEAXAN (PRESERVISION AREDS 2 ORAL) [VIT C/E/ZN/COPPR/LUTEIN/ZEAXAN (PRESERVISION AREDS 2 ORAL)] Take 1 tablet by mouth 2 (two) times a day.        Allergies   Allergen Reactions    Cephalexin Shortness Of " Breath    Erythromycin Base [Erythromycin] Rash    Penicillins Unknown    Sulfa (Sulfonamide Antibiotics) [Sulfa Antibiotics] Rash    Vancomycin Itching          Lab Results   Lab Results   Component Value Date     03/28/2024    CO2 25 03/28/2024    CO2 24 03/31/2022    BUN 19.7 03/28/2024    BUN 14 03/31/2022     Lab Results   Component Value Date    WBC 6.7 03/28/2024    HGB 12.8 03/28/2024    HCT 38.4 03/28/2024    MCV 93 03/28/2024     03/28/2024     Lab Results   Component Value Date    CHOL 173 02/29/2024    TRIG 62 02/29/2024    HDL 87 02/29/2024     Lab Results   Component Value Date    INR 1.16 06/01/2018     Lab Results   Component Value Date     04/08/2021     Lab Results   Component Value Date    TROPONINI 0.31 06/01/2018    TROPONINI 0.41 05/31/2018    TROPONINI 0.28 05/31/2018     Lab Results   Component Value Date    TSH 2.17 02/29/2024    TSH 2.62 03/31/2022

## 2024-09-17 NOTE — PROGRESS NOTES
Preoperative Evaluation  [facility]  [address] [facility] [address]  Phone: [phone]  Fax: [phone]  Primary Provider: Flex Bain MD  Pre-op Performing Provider: Eduardo Trent MD  Sep 17, 2024             9/17/2024   Surgical Information   What procedure is being done? Right carpal tunnel & Cubital tunnel surgery   Facility or Hospital where procedure/surgery will be performed: [facility]   Who is doing the procedure / surgery? Dr Fuller   Date of surgery / procedure: 9/27/24   Time of surgery / procedure: center to call 24hrs before surgery   Where do you plan to recover after surgery? at home alone        Fax number for surgical facility: [phone]    Assessment & Plan     The proposed surgical procedure is considered LOW risk.    Carpal tunnel syndrome of right wrist  Due to ongoing pain in the hand and elbow patient is elected for surgical correction    Cubital tunnel syndrome on right  Ongoing pain for many years obstructing her sleep pattern at night patient is elected for surgical correction    Preoperative examination  Patient is due for lab work this Friday will obtain CBC and vitamin D levels  - CBC with platelets    Osteoporosis, unspecified osteoporosis type, unspecified pathological fracture presence  Patient with a history of osteoporosis following with endocrine  Vitamin D levels been running high so she discontinued her vitamin D supplement  Would like to check vitamin D levels  - Vitamin D Deficiency    Actinic keratosis  Patient requested liquid nitrogen freezing of an actinic keratosis right forearm this is accomplished by 6 to 8-second intervals with liquid nitrogen x 4  Patient tolerated procedure              - No identified additional risk factors other than previously addressed    Antiplatelet or Anticoagulation Medication Instructions   - Patient is on no antiplatelet or anticoagulation  medications.    Additional Medication Instructions  Patient aware of what medications to hold prior to surgery    Recommendation  Approval given to proceed with proposed procedure, without further diagnostic evaluation.    Donovan Killian is a 89 year old, presenting for the following:  Pre-Op Exam        HPI related to upcoming procedure: Patient here for surgical preop for carpal tunnel and cubital fossa release.  Due to ongoing pain and issues affecting her sleep for many years with her wrist hand and elbow patient is elected for surgical correction.  She has tolerated previous surgeries in the past.  She is due for some labs later this week due to recent change in diuretics by cardiology.  Will have her creatinine checked on Friday and added a CBC as well as she like to have her vitamin D level checked as she stopped her supplement in the last year.  She follows with endocrinology for Prolia injections.        9/17/2024   Pre-Op Questionnaire   Have you ever had a heart attack or stroke? No   Have you ever had surgery on your heart or blood vessels, such as a stent placement, a coronary artery bypass, or surgery on an artery in your head, neck, heart, or legs? No   Do you have chest pain with activity? No   Do you have a history of heart failure? No   Do you currently have a cold, bronchitis or symptoms of other infection? No   Do you have a cough, shortness of breath, or wheezing? No   Do you or anyone in your family have previous history of blood clots? No   Do you or does anyone in your family have a serious bleeding problem such as prolonged bleeding following surgeries or cuts? No   Have you ever had problems with anemia or been told to take iron pills? No   Have you had any abnormal blood loss such as black, tarry or bloody stools, or abnormal vaginal bleeding? No   Have you ever had a blood transfusion? No   Are you willing to have a blood transfusion if it is medically needed before, during, or after  your surgery? Yes   Have you or any of your relatives ever had problems with anesthesia? No   Do you have sleep apnea, excessive snoring or daytime drowsiness? No   Do you have any artifical heart valves or other implanted medical devices like a pacemaker, defibrillator, or continuous glucose monitor? No   Do you have artificial joints? (!) YES   Are you allergic to latex? No                Patient Active Problem List    Diagnosis Date Noted    Personal history of other drug therapy 07/26/2022     Priority: Medium    Furuncle 09/22/2021     Priority: Medium     Formatting of this note might be different from the original.  Created by Conversion    Replacement Utility updated for latest IMO load      Lightheadedness 09/22/2021     Priority: Medium     Formatting of this note might be different from the original.  Created by Conversion      Perforated duodenal ulcer (H) 02/15/2021     Priority: Medium    Hypothyroidism 02/15/2021     Priority: Medium    Mixed hyperlipidemia      Priority: Medium     Created by Conversion        S/P exploratory laparotomy 02/12/2021     Priority: Medium     Anesthesia Type:  Clinton Madden MD   Location: [facility] Main OR  Procedure Date:  2/9/2021   EXPLORATORY LAPAROTOMY, PLACEMENT OF GASTROSTOMY TUBE, REPAIR GRAHM PATCH   OF DUODENAL ULCER        Back Pain      Priority: Medium     Created by Conversion  Replacement Utility updated for latest IMO load        Free intraperitoneal air 02/09/2021     Priority: Medium     Added automatically from request for surgery [id number]        Duodenal ulcer with perforation (H) 02/09/2021     Priority: Medium     Anesthesia Type:  Clinton Madden MD   Location: [facility] Main OR  Procedure Date:  2/9/2021  EXPLORATORY LAPAROTOMY, PLACEMENT OF GASTROSTOMY TUBE, REPAIR GRAHM PATCH   OF DUODENAL ULCER        Hiatal hernia      Priority: Medium    Actinic keratosis 01/21/2021     Priority: Medium    Hair thinning 01/21/2021      Priority: Medium    Supraventricular tachycardia (H24) 05/31/2018     Priority: Medium    Generalized Osteoarthritis Of Multiple Sites      Priority: Medium     Created by Conversion        Hypotension, unspecified hypotension type      Priority: Medium    Abnormal Blood Chemistry      Priority: Medium     Created by Conversion  Replacement Utility updated for latest IMO load        Osteoarthritis Of The Ankle/Foot (Multiple Joints)      Priority: Medium     Created by Conversion  Replacement Utility updated for latest IMO load        Vaginal Discharge (Symptom)      Priority: Medium     Created by Conversion  Replacement Utility updated for latest IMO load        Osteoporosis Senile      Priority: Medium     Created by Conversion        Automatic Atrial Tachycardia      Priority: Medium     Created by Conversion        Fatigue      Priority: Medium     Created by Conversion        Temperature Intolerance To Cold   (Consistent)      Priority: Medium     Created by Conversion        Edema      Priority: Medium     Created by Conversion          Past Medical History:   Diagnosis Date    Back pain     Furuncle     Hyperlipidemia     Macular degeneration     Macular degeneration     Osteoarthritis     Osteoporosis     SVT (supraventricular tachycardia) (H24)     SVT (supraventricular tachycardia) (H24)      Past Surgical History:   Procedure Laterality Date    EP ABLATION SVT Right 6/26/2018    Procedure: EP Ablation Supra Ventricular;  Surgeon: Dany Renae MD;  Location: [facility];  Service:     ESOPHAGOSCOPY, GASTROSCOPY, DUODENOSCOPY (EGD), COMBINED N/A 4/4/2022    Procedure: ESOPHAGOGASTRODUODENOSCOPY with esophageal biopsy;  Surgeon: Ashlyn Acuna MD;  Location: [facility] GI    GASTROSTOMY, INSERT TUBE, COMBINED N/A 2/9/2021    Procedure: PLACEMENT OF GASTROSTOMY TUBE,;  Surgeon: Clinton Pavon MD;  Location: [facility] OR;  Service: General    HC REMOVAL ADENOIDS,PRIMARY,<13 Y/O       Description: Adenoidectomy;  Recorded: 12/31/2007;    HC REMOVAL OF TONSILS,<13 Y/O      Description: Tonsillectomy;  Recorded: 12/31/2007;    HYSTERECTOMY      LAPAROTOMY EXPLORATORY N/A 2/9/2021    Procedure: EXPLORATORY LAPAROTOMY,  REPAIR GRAHM PATCH OF DUODENAL ULCER;  Surgeon: Clinton Pavon MD;  Location: [facility];  Service: General    OOPHORECTOMY      [facility] APPENDECTOMY      Description: Appendectomy;  Recorded: 12/31/2007;    [facility] TOTAL KNEE ARTHROPLASTY      Description: Total Knee Arthroplasty;  Recorded: 12/31/2007;    [facility] VAG HYST,RMV TUBE/OVARY,FIX ENTEROCE      Description: Vag Hysterect Uterus <250g Remov Both Ovaries Rep Enterocele;  Recorded: 12/31/2007;     Current Outpatient Medications   Medication Sig Dispense Refill    calcium carbonate (OS-DOMONIQUE) 600 mg (1,500 mg) tablet [CALCIUM CARBONATE (OS-DOMONIQUE) 600 MG (1,500 MG) TABLET] Take 600 mg by mouth every other day.             furosemide (LASIX) 40 MG tablet Take 1 tablet (40 mg) by mouth daily. 90 tablet 3    levothyroxine (SYNTHROID/LEVOTHROID) 25 MCG tablet [LEVOTHYROXINE (SYNTHROID, LEVOTHROID) 25 MCG TABLET] TAKE 1 TABLET DAILY AT 6:00 A.M. 90 tablet 3    metoprolol succinate ER (TOPROL XL) 50 MG 24 hr tablet Take 1 tablet (50 mg) by mouth at bedtime 90 tablet 3    OMEGA-3/DHA/EPA/FISH OIL (FISH OIL-OMEGA-3 FATTY ACIDS) 300-1,000 mg capsule [OMEGA-3/DHA/EPA/FISH OIL (FISH OIL-OMEGA-3 FATTY ACIDS) 300-1,000 MG CAPSULE] Take 2,000 mg by mouth daily.       pantoprazole (PROTONIX) 40 MG EC tablet Take 1 tablet (40 mg) by mouth 2 times daily 180 tablet 3    simvastatin (ZOCOR) 20 MG tablet [SIMVASTATIN (ZOCOR) 20 MG TABLET] TAKE 1 TABLET DAILY AT BEDTIME 90 tablet 3    VIT C/E/ZN/COPPR/LUTEIN/ZEAXAN (PRESERVISION AREDS 2 ORAL) [VIT C/E/ZN/COPPR/LUTEIN/ZEAXAN (PRESERVISION AREDS 2 ORAL)] Take 1 tablet by mouth 2 (two) times a day.      Cholecalciferol (VITAMIN D3) 25 MCG (1000 UT) CAPS Take 1 capsule by mouth daily (Patient not taking:  "Reported on 9/17/2024)         Allergies   Allergen Reactions    Cephalexin Shortness Of Breath    Erythromycin Base [Erythromycin] Rash    Penicillins Unknown    Sulfa (Sulfonamide Antibiotics) [Sulfa Antibiotics] Rash    Vancomycin Itching        Social History     Tobacco Use    Smoking status: Never     Passive exposure: Never    Smokeless tobacco: Never   Substance Use Topics    Alcohol use: No       History   Drug Use No               Objective    BP 96/60 (BP Location: Left arm, Patient Position: Sitting, Cuff Size: Adult Regular)   Pulse 81   Temp 98.1  F (36.7  C) (Oral)   Resp 20   Ht 1.504 m (4' 11.21\")   Wt 54.4 kg (120 lb)   SpO2 96%   BMI 24.06 kg/m     Estimated body mass index is 24.06 kg/m  as calculated from the following:    Height as of this encounter: 1.504 m (4' 11.21\").    Weight as of this encounter: 54.4 kg (120 lb).  Physical Exam  GENERAL: alert and no distress  EYES: Eyes grossly normal to inspection, PERRL and conjunctivae and sclerae normal  RESP: lungs clear to auscultation - no rales, rhonchi or wheezes  CV: regular rate and rhythm, normal S1 S2, no S3 or S4, no murmur, click or rub, no peripheral edema  ABDOMEN: bowel sounds normal  MS: no gross musculoskeletal defects noted, no edema  NEURO: Normal strength and tone, mentation intact and speech normal  PSYCH: mentation appears normal, affect normal/bright    Recent Labs   Lab Test 03/28/24  0738 03/28/24  0737 02/29/24  0946   HGB  --  12.8  --    PLT  --  225  --      --   --    POTASSIUM 4.1  --   --    CR 0.97*  --  1.05*        Diagnostics  Labs pending at this time.  Results will be reviewed when available.   No EKG required for low risk surgery (cataract, skin procedure, breast biopsy, etc).    Revised Cardiac Risk Index (RCRI)  The patient has the following serious cardiovascular risks for perioperative complications:   - No serious cardiac risks = 0 points     RCRI Interpretation: 0 points: Class I (very low " risk - 0.4% complication rate)         Signed Electronically by: Eduardo Trent MD  A copy of this evaluation report is provided to the requesting physician.

## 2024-09-17 NOTE — LETTER
9/17/2024    Flex Bain MD  [address]  [facility] [address]    RE: Maria D Moore       Dear Colleague,     I had the pleasure of seeing Maria D Moore in the [facility].         [facility]   [address] [facility] [address], [place] [address]   [email or web address]   OFFICE: [phone]            Impression and Plan     1. Supraventricular tachycardia. Maria D underwent successful ablation of slow AV node pathway with cryoablation 26 June 2018.  This denies any subjective recurrence of SVT since my last visit with her.       2.  Mitral insufficiency.  This was felt trace-mild on last echocardiogram 27 July 2023     3.  Bilateral lower extremity edema.  Maria D has noted some bothersome lower extremity edema, albeit fairly mild.  She has chronic edema involving the left ankle after suffering an injury in her teenage years.  She reports it is somewhat worse toward the end of the day particularly if she has been up on her feet for extended periods of time.  Her edema, however, has worsened recently.  Plan:  Will increase furosemide from 20 mg to 40 mg daily though indicated that if efficacious we could try going back to the 20 mg.  Will obtain a basic metabolic panel in approximately 5 days to reassess electrolytes in light of increasing dose of furosemide.     Tentatively plan on follow-up in 4 months primarily to follow-up on lower extremity edema though Marai D was instructed to call with updates as needed.      35 minutes spent reviewing prior records (including documentation, laboratory studies, cardiac testing/imaging), interview with patient along with physical exam, planning, and subsequent documentation/crafting of note).           History of Present Illness    Once again I would like to thank you again for asking me to participate in the care of your patient, Maria D Moore.  As you know, but to reiterate for my own records, Maria D CHU  Oscar is a 89 year old female with history of SVT.  Maria D underwent successful ablation of slow AV node pathway with cryoablation 26 June 2018.     In follow-up today, Maria D denies any recurrent subjective SVT since my last visit with her.  She denies any chest pain.  Her breathing has been comfortable.       She still has some lower extremity edema although this has increased somewhat as of late.  Has been somewhat uncomfortable for her.    Further review of systems is otherwise negative/noncontributory (medical record and 13 point review of systems reviewed as well and pertinent positives noted).         Cardiac Diagnostics      Echocardiogram 27 July 2023:  Normal left ventricular size and systolic performance with ejection fraction of 60-65%.  Mild-moderate tricuspid insufficiency.  Normal right ventricular size and systolic performance.  Normal atrial dimensions.    Echocardiogram 28 April 2022:  Normal left ventricular size and systolic performance with ejection fraction of 60 to 65%.  Trace aortic insufficiency.  Mild-moderate mitral insufficiency.  Moderate tricuspid insufficiency.  Normal right ventricular size and systolic performance.  Normal atrial dimensions.    Echocardiogram 1 June 2018 (personally reviewed):  Normal left ventricular size and systolic performance with a visually estimated ejection fraction of 65%.   There is moderate mitral insufficiency.   There is moderate tricuspid insufficiency.   Normal right ventricular size and systolic performance.   There is moderate bi-atrial enlargement.  When compared to the prior real-time echocardiogram dated 14 August 2010, the degree of mitral insufficiency has increased from mild to now moderate.    Echocardiogram 14 August 2010:  Normal left ventricular size and systolic performance with ejection fraction of 55%.  Moderate tricuspid insufficiency.  Mild left atrial enlargement.    Nuclear perfusion imaging study 1 August 2018:  Small reversible  "defect in the anterior/apical segments.  Significant splanchnic artifact reducing sensitivity of finding.  Normal left ventricular systolic performance with ejection fraction of 81%.    Event recorder 6 April 2016 through 2 May 2016:  Largely normal 30-day event monitor.  Symptoms correlating with sinus rhythm and occasional atrial ectopy.    Holter monitor 2 March 2015:  Moderate amount of atrial ectopy is present but most of the PACs are singular or short runs at modest rates.   No atrial fibrillation identified.                Physical Examination       /69 (BP Location: Left arm, Patient Position: Sitting, Cuff Size: Adult Regular)   Pulse 62   Resp 16   Ht 1.549 m (5' 1\")   Wt 53.1 kg (117 lb)   SpO2 97%   BMI 22.11 kg/m          Wt Readings from Last 3 Encounters:   09/17/24 53.1 kg (117 lb)   03/28/24 56.5 kg (124 lb 9.6 oz)   02/07/24 56.7 kg (125 lb)       The patient is alert and oriented times three. Sclerae are anicteric. Mucosal membranes are moist. Jugular venous pressure is normal. No significant adenopathy/thyromegally appreciated. Lungs are clear with good expansion. On cardiovascular exam, the patient has a regular S1 and S2. Abdomen is soft and non-tender. Extremities reveal no clubbing, cyanosis, with mild-moderate bilateral lower extremity edema.         Family History/Social History/Risk Factors   Patient does not smoke.  Family history reviewed, and family history includes Cerebrovascular Disease in her father.          Medical History  Surgical History Family History Social History   Past Medical History:   Diagnosis Date     Back pain      Furuncle      Hyperlipidemia      Macular degeneration      Macular degeneration      Osteoarthritis      Osteoporosis      SVT (supraventricular tachycardia) (H24)      SVT (supraventricular tachycardia) (H24)      Past Surgical History:   Procedure Laterality Date     EP ABLATION SVT Right 6/26/2018    Procedure: EP Ablation Supra " Ventricular;  Surgeon: Dany Renae MD;  Location: [facility];  Service:      ESOPHAGOSCOPY, GASTROSCOPY, DUODENOSCOPY (EGD), COMBINED N/A 4/4/2022    Procedure: ESOPHAGOGASTRODUODENOSCOPY with esophageal biopsy;  Surgeon: Ashlyn Acuna MD;  Location: [facility] GI     GASTROSTOMY, INSERT TUBE, COMBINED N/A 2/9/2021    Procedure: PLACEMENT OF GASTROSTOMY TUBE,;  Surgeon: Clinton Pavon MD;  Location: [facility] OR;  Service: General     HC REMOVAL ADENOIDS,PRIMARY,<11 Y/O      Description: Adenoidectomy;  Recorded: 12/31/2007;     HC REMOVAL OF TONSILS,<11 Y/O      Description: Tonsillectomy;  Recorded: 12/31/2007;     HYSTERECTOMY       LAPAROTOMY EXPLORATORY N/A 2/9/2021    Procedure: EXPLORATORY LAPAROTOMY,  REPAIR GRAHM PATCH OF DUODENAL ULCER;  Surgeon: Clinton Pavon MD;  Location: [facility];  Service: General     OOPHORECTOMY       Z APPENDECTOMY      Description: Appendectomy;  Recorded: 12/31/2007;     [facility] TOTAL KNEE ARTHROPLASTY      Description: Total Knee Arthroplasty;  Recorded: 12/31/2007;     [facility] VAG HYST,RMV TUBE/OVARY,FIX ENTEROCE      Description: Vag Hysterect Uterus <250g Remov Both Ovaries Rep Enterocele;  Recorded: 12/31/2007;     Family History   Problem Relation Age of Onset     Cerebrovascular Disease Father         Social History     Socioeconomic History     Marital status:      Spouse name: Not on file     Number of children: Not on file     Years of education: Not on file     Highest education level: Not on file   Occupational History     Not on file   Tobacco Use     Smoking status: Never     Passive exposure: Never     Smokeless tobacco: Never   Substance and Sexual Activity     Alcohol use: No     Drug use: No     Sexual activity: Not on file   Other Topics Concern     Parent/sibling w/ CABG, MI or angioplasty before 65F 55M? 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[employer], teaching medical dispensing and kind of a  for troubleshooting at current residence     Social Determinants of Health     Financial Resource Strain: Low Risk  (3/28/2024)    Financial Resource Strain      Within the past 12 months, have you or your family members you live with been unable to get utilities (heat, electricity) when it was really needed?: No   Food Insecurity: Low Risk  (3/28/2024)    Food Insecurity      Within the past 12 months, did you worry that your food would run out before you got money to buy more?: No      Within the past 12 months, did the food you bought just not last and you didn t have money to get more?: No   Transportation Needs: Low Risk  (3/28/2024)    Transportation Needs      Within the past 12 months, has lack of transportation kept you from medical appointments, getting your medicines, non-medical meetings or appointments, work, or from getting things that you need?: No   Physical Activity: Unknown (3/28/2024)    Exercise Vital Sign      Days of Exercise per Week: 4 days      Minutes of Exercise per Session: Not on file   Stress: No Stress Concern Present (3/28/2024)    [employer] - Occupational Stress Questionnaire      Feeling of Stress : Only a little   Social Connections: Unknown (3/28/2024)    Social Connection and Isolation Panel [NHANES]      Frequency of Communication with Friends and Family: Not on file      Frequency of Social Gatherings with Friends and Family: Three times a week      Attends Quaker Services: Not on file      Active Member of Clubs or Organizations: Not on file      Attends Club or Organization Meetings: Not on file      Marital Status: Not on file   Interpersonal Safety: Low Risk  (3/28/2024)    Interpersonal Safety      Do you feel physically and emotionally safe where you currently live?: Yes      Within the past 12 months, have you been hit, slapped, kicked or  otherwise physically hurt by someone?: No      Within the past 12 months, have you been humiliated or emotionally abused in other ways by your partner or ex-partner?: No   Housing Stability: Low Risk  (3/28/2024)    Housing Stability      Do you have housing? : Yes      Are you worried about losing your housing?: No           Medications  Allergies   Current Outpatient Medications   Medication Sig Dispense Refill     calcium carbonate (OS-DOMONIQUE) 600 mg (1,500 mg) tablet [CALCIUM CARBONATE (OS-DOMONIQUE) 600 MG (1,500 MG) TABLET] Take 600 mg by mouth every other day.              Cholecalciferol (VITAMIN D3) 25 MCG (1000 UT) CAPS Take 1 capsule by mouth daily       furosemide (LASIX) 40 MG tablet Take 1 tablet (40 mg) by mouth daily. 90 tablet 3     levothyroxine (SYNTHROID/LEVOTHROID) 25 MCG tablet [LEVOTHYROXINE (SYNTHROID, LEVOTHROID) 25 MCG TABLET] TAKE 1 TABLET DAILY AT 6:00 A.M. 90 tablet 3     metoprolol succinate ER (TOPROL XL) 50 MG 24 hr tablet Take 1 tablet (50 mg) by mouth at bedtime 90 tablet 3     OMEGA-3/DHA/EPA/FISH OIL (FISH OIL-OMEGA-3 FATTY ACIDS) 300-1,000 mg capsule [OMEGA-3/DHA/EPA/FISH OIL (FISH OIL-OMEGA-3 FATTY ACIDS) 300-1,000 MG CAPSULE] Take 2,000 mg by mouth daily.        pantoprazole (PROTONIX) 40 MG EC tablet Take 1 tablet (40 mg) by mouth 2 times daily 180 tablet 3     simvastatin (ZOCOR) 20 MG tablet [SIMVASTATIN (ZOCOR) 20 MG TABLET] TAKE 1 TABLET DAILY AT BEDTIME 90 tablet 3     VIT C/E/ZN/COPPR/LUTEIN/ZEAXAN (PRESERVISION AREDS 2 ORAL) [VIT C/E/ZN/COPPR/LUTEIN/ZEAXAN (PRESERVISION AREDS 2 ORAL)] Take 1 tablet by mouth 2 (two) times a day.         Allergies   Allergen Reactions     Cephalexin Shortness Of Breath     Erythromycin Base [Erythromycin] Rash     Penicillins Unknown     Sulfa (Sulfonamide Antibiotics) [Sulfa Antibiotics] Rash     Vancomycin Itching          Lab Results    Chemistry/lipid CBC Cardiac Enzymes/BNP/TSH/INR   Recent Labs   Lab Test 02/29/24  0946   CHOL 173   HDL 87  "  LDL 74   TRIG 62     Recent Labs   Lab Test 02/29/24  0946 03/13/23  1023 03/31/22  0802   LDL 74 90 89     Recent Labs   Lab Test 03/28/24  0738      POTASSIUM 4.1   CHLORIDE 104   CO2 25   GLC 87   BUN 19.7   CR 0.97*   GFRESTIMATED 56*   DOMONIQUE 10.3*     Recent Labs   Lab Test 03/28/24  0738 02/29/24  0946 07/28/23  0702   CR 0.97* 1.05* 0.96*     No results for input(s): \"A1C\" in the last 60788 hours.       Recent Labs   Lab Test 03/28/24  0737   WBC 6.7   HGB 12.8   HCT 38.4   MCV 93        Recent Labs   Lab Test 03/28/24  0737 03/13/23  1023 03/31/22  0802   HGB 12.8 13.3 13.6    Recent Labs   Lab Test 06/01/18  0706 05/31/18  2305 05/31/18  1727   TROPONINI 0.31* 0.41* 0.28     Recent Labs   Lab Test 04/08/21  1036 02/10/21  0438   * 518*     Recent Labs   Lab Test 02/29/24  0946   TSH 2.17     Recent Labs   Lab Test 06/01/18  0011 05/31/18  1225   INR 1.16* 1.12*          Medications  Allergies   Current Outpatient Medications   Medication Sig Dispense Refill     calcium carbonate (OS-DOMONIQUE) 600 mg (1,500 mg) tablet [CALCIUM CARBONATE (OS-DOMONIQUE) 600 MG (1,500 MG) TABLET] Take 600 mg by mouth every other day.              Cholecalciferol (VITAMIN D3) 25 MCG (1000 UT) CAPS Take 1 capsule by mouth daily       furosemide (LASIX) 40 MG tablet Take 1 tablet (40 mg) by mouth daily. 90 tablet 3     levothyroxine (SYNTHROID/LEVOTHROID) 25 MCG tablet [LEVOTHYROXINE (SYNTHROID, LEVOTHROID) 25 MCG TABLET] TAKE 1 TABLET DAILY AT 6:00 A.M. 90 tablet 3     metoprolol succinate ER (TOPROL XL) 50 MG 24 hr tablet Take 1 tablet (50 mg) by mouth at bedtime 90 tablet 3     OMEGA-3/DHA/EPA/FISH OIL (FISH OIL-OMEGA-3 FATTY ACIDS) 300-1,000 mg capsule [OMEGA-3/DHA/EPA/FISH OIL (FISH OIL-OMEGA-3 FATTY ACIDS) 300-1,000 MG CAPSULE] Take 2,000 mg by mouth daily.        pantoprazole (PROTONIX) 40 MG EC tablet Take 1 tablet (40 mg) by mouth 2 times daily 180 tablet 3     simvastatin (ZOCOR) 20 MG tablet [SIMVASTATIN " (ZOCOR) 20 MG TABLET] TAKE 1 TABLET DAILY AT BEDTIME 90 tablet 3     VIT C/E/ZN/COPPR/LUTEIN/ZEAXAN (PRESERVISION AREDS 2 ORAL) [VIT C/E/ZN/COPPR/LUTEIN/ZEAXAN (PRESERVISION AREDS 2 ORAL)] Take 1 tablet by mouth 2 (two) times a day.        Allergies   Allergen Reactions     Cephalexin Shortness Of Breath     Erythromycin Base [Erythromycin] Rash     Penicillins Unknown     Sulfa (Sulfonamide Antibiotics) [Sulfa Antibiotics] Rash     Vancomycin Itching          Lab Results   Lab Results   Component Value Date     03/28/2024    CO2 25 03/28/2024    CO2 24 03/31/2022    BUN 19.7 03/28/2024    BUN 14 03/31/2022     Lab Results   Component Value Date    WBC 6.7 03/28/2024    HGB 12.8 03/28/2024    HCT 38.4 03/28/2024    MCV 93 03/28/2024     03/28/2024     Lab Results   Component Value Date    CHOL 173 02/29/2024    TRIG 62 02/29/2024    HDL 87 02/29/2024     Lab Results   Component Value Date    INR 1.16 06/01/2018     Lab Results   Component Value Date     04/08/2021     Lab Results   Component Value Date    TROPONINI 0.31 06/01/2018    TROPONINI 0.41 05/31/2018    TROPONINI 0.28 05/31/2018     Lab Results   Component Value Date    TSH 2.17 02/29/2024    TSH 2.62 03/31/2022                      Thank you for allowing me to participate in the care of your patient.      Sincerely,     Radha Contreras MD     [facility]  cc:   Radha Contreras MD  [address] [facility] [address]  [place] [address]

## 2024-09-20 ENCOUNTER — LAB (OUTPATIENT)
Dept: LAB | Facility: CLINIC | Age: 89
End: 2024-09-20
Payer: COMMERCIAL

## 2024-09-20 DIAGNOSIS — M81.0 OSTEOPOROSIS, UNSPECIFIED OSTEOPOROSIS TYPE, UNSPECIFIED PATHOLOGICAL FRACTURE PRESENCE: ICD-10-CM

## 2024-09-20 DIAGNOSIS — R73.9 ELEVATED BLOOD SUGAR: ICD-10-CM

## 2024-09-20 DIAGNOSIS — M81.0 SENILE OSTEOPOROSIS: ICD-10-CM

## 2024-09-20 DIAGNOSIS — R60.0 BILATERAL LOWER EXTREMITY EDEMA: ICD-10-CM

## 2024-09-20 DIAGNOSIS — Z01.818 PREOPERATIVE EXAMINATION: ICD-10-CM

## 2024-09-20 LAB
ANION GAP SERPL CALCULATED.3IONS-SCNC: 12 MMOL/L (ref 7–15)
BUN SERPL-MCNC: 16.3 MG/DL (ref 8–23)
CALCIUM SERPL-MCNC: 9.2 MG/DL (ref 8.8–10.4)
CALCIUM SERPL-MCNC: 9.2 MG/DL (ref 8.8–10.4)
CHLORIDE SERPL-SCNC: 101 MMOL/L (ref 98–107)
CREAT SERPL-MCNC: 0.98 MG/DL (ref 0.51–0.95)
EGFRCR SERPLBLD CKD-EPI 2021: 55 ML/MIN/1.73M2
ERYTHROCYTE [DISTWIDTH] IN BLOOD BY AUTOMATED COUNT: 13.8 % (ref 10–15)
GLUCOSE SERPL-MCNC: 135 MG/DL (ref 70–99)
HCO3 SERPL-SCNC: 27 MMOL/L (ref 22–29)
HCT VFR BLD AUTO: 41.9 % (ref 35–47)
HGB BLD-MCNC: 13.6 G/DL (ref 11.7–15.7)
MCH RBC QN AUTO: 30.4 PG (ref 26.5–33)
MCHC RBC AUTO-ENTMCNC: 32.5 G/DL (ref 31.5–36.5)
MCV RBC AUTO: 94 FL (ref 78–100)
PLATELET # BLD AUTO: 245 10E3/UL (ref 150–450)
POTASSIUM SERPL-SCNC: 3.8 MMOL/L (ref 3.4–5.3)
RBC # BLD AUTO: 4.48 10E6/UL (ref 3.8–5.2)
SODIUM SERPL-SCNC: 140 MMOL/L (ref 135–145)
VIT D+METAB SERPL-MCNC: 53 NG/ML (ref 20–50)
WBC # BLD AUTO: 6.4 10E3/UL (ref 4–11)

## 2024-09-20 PROCEDURE — 80048 BASIC METABOLIC PNL TOTAL CA: CPT

## 2024-09-20 PROCEDURE — 82306 VITAMIN D 25 HYDROXY: CPT

## 2024-09-20 PROCEDURE — 83036 HEMOGLOBIN GLYCOSYLATED A1C: CPT

## 2024-09-20 PROCEDURE — 85027 COMPLETE CBC AUTOMATED: CPT

## 2024-09-23 ENCOUNTER — TELEPHONE (OUTPATIENT)
Dept: FAMILY MEDICINE | Facility: CLINIC | Age: 89
End: 2024-09-23
Payer: COMMERCIAL

## 2024-09-23 DIAGNOSIS — R73.9 ELEVATED BLOOD SUGAR: Primary | ICD-10-CM

## 2024-09-23 LAB
EST. AVERAGE GLUCOSE BLD GHB EST-MCNC: 108 MG/DL
HBA1C MFR BLD: 5.4 % (ref 0–5.6)

## 2024-09-23 NOTE — TELEPHONE ENCOUNTER
Please inform patient that I have reviewed her labs.  Postprandial blood sugars can be raised.    For further workup, I have ordered A1c that is an average 3 months blood sugar to the labs already drawn and I will get back to her in case that shows elevated levels concerning for prediabetes.    Flex Bain MD

## 2024-09-23 NOTE — TELEPHONE ENCOUNTER
Called patient and lmtcb,    Can xfer to nurse if needed to discuss HgbA1c testing though no action is needed on her part as the lab is in process at the time of this note.    Elie Martini RN     [facility]

## 2024-09-23 NOTE — TELEPHONE ENCOUNTER
Patient calling about recent lab draws from 9/20. She noticed her BG levels were high (135) and patient wanted to let PCP know that she had chocolate chip cookies right before her appointment because she did not know that her BG was going to be drawn. She wants to know if it is necessary to come back in for another lab draw. Writer told patient that it is not necessary, given she had foods just before her appointment, but would let PCP know that she had called, as requested.     Kwadwo Brown RN

## 2024-09-24 ENCOUNTER — ALLIED HEALTH/NURSE VISIT (OUTPATIENT)
Dept: ENDOCRINOLOGY | Facility: CLINIC | Age: 89
End: 2024-09-24
Payer: COMMERCIAL

## 2024-09-24 DIAGNOSIS — M81.0 SENILE OSTEOPOROSIS: Primary | ICD-10-CM

## 2024-09-24 DIAGNOSIS — Z92.29 PERSONAL HISTORY OF OTHER DRUG THERAPY: ICD-10-CM

## 2024-09-24 PROCEDURE — 99207 PR NO CHARGE NURSE ONLY: CPT | Performed by: NURSE PRACTITIONER

## 2024-09-24 PROCEDURE — 99207 PR NO CHARGE NURSE ONLY: CPT

## 2024-09-24 PROCEDURE — 96372 THER/PROPH/DIAG INJ SC/IM: CPT | Performed by: NURSE PRACTITIONER

## 2024-09-24 NOTE — PROGRESS NOTES
Clinic Administered Medication Documentation      Prolia Documentation    Indication: Prolia  (denosumab) is a prescription medicine used to treat osteoporosis in patients who:   Are at high risk for fracture, meaning patients who have had a fracture related to osteoporosis, or who have multiple risk factors for fracture.  Cannot use another osteoporosis medicine or other osteoporosis medicines did not work well.  The timeline for early/late injections would be 4 weeks early and any time after the 6 month nomi. If a patient receives their injection late, then the subsequent injection would be 6 months from the date that they actually received the injection.    When was the last injection?  3/5/24  Was the last injection at least 6 months ago? Yes  Has the prior authorization been completed?  Yes  Is there an active order (written within the past 365 days, with administrations remaining, not ) in the chart?  Yes   GFR Estimate   Date Value Ref Range Status   2024 55 (L) >60 mL/min/1.73m2 Final     Comment:     eGFR calculated using  CKD-EPI equation.   2021 >60 >60 mL/min/1.73m2 Final     Has patient had a GFR within the last 12 months? Yes   Is GFR under 30, or patient has a diagnosis of CKD4 or CKD5? No   Patient denies gastric bypass or parathyroid surgery in past 6 months? Yes - patient denies.   Patient denies dental work in the past two months involving drilling into the bone, such as implants/extractions, oral surgery or a tooth extraction that has not healed yet?  Yes  Patient denies plans for an emergency tooth extraction within the next week? Yes    The following steps were completed to comply with the REMS program for Prolia:  Reviewed information in the Medication Guide, including the serious risks of Prolia  and the symptoms of each risk.  Advised patient to seek prompt medical attention if they have signs or symptoms of any of the serious risks.  Provided each patient a copy of  the Medication Guide and Patient Guide.    Prior to injection, verified patient identity using patient's name and date of birth. Medication was administered. Please see MAR and medication order for additional information. Patient instructed to remain in clinic for 15 minutes and report any adverse reaction to staff immediately.    Vial/Syringe: Syringe  Was this medication supplied by the patient? No  Verified that the patient has refills remaining in their prescription.

## 2024-10-24 NOTE — TELEPHONE ENCOUNTER
I called LM for the pt to c/b to discuss the below.   The report was no addended, however, an Endocrinologist and the tech that performed the scan further reviewed the images and said;  The first two lumbar vertebrae are worse than the 3rd and 4th, so the 3rd and 4th vertebrae are the ones that should have been assessed/read on the scan, but were not, and the 7% decline in the spine is accurate, despite the way the dexa was read.    Statement Selected

## 2024-10-31 ENCOUNTER — HOSPITAL ENCOUNTER (OUTPATIENT)
Dept: BONE DENSITY | Facility: HOSPITAL | Age: 89
Discharge: HOME | End: 2024-10-31
Attending: NURSE PRACTITIONER | Admitting: NURSE PRACTITIONER
Payer: COMMERCIAL

## 2024-10-31 DIAGNOSIS — M81.0 SENILE OSTEOPOROSIS: ICD-10-CM

## 2024-10-31 PROCEDURE — 77080 DXA BONE DENSITY AXIAL: CPT

## 2024-11-01 ENCOUNTER — TELEPHONE (OUTPATIENT)
Dept: ENDOCRINOLOGY | Facility: CLINIC | Age: 89
End: 2024-11-01
Payer: COMMERCIAL

## 2024-12-13 ENCOUNTER — ANCILLARY PROCEDURE (OUTPATIENT)
Dept: MAMMOGRAPHY | Facility: CLINIC | Age: 89
End: 2024-12-13
Attending: FAMILY MEDICINE
Payer: COMMERCIAL

## 2024-12-13 DIAGNOSIS — Z12.31 VISIT FOR SCREENING MAMMOGRAM: ICD-10-CM

## 2024-12-13 PROCEDURE — 77063 BREAST TOMOSYNTHESIS BI: CPT

## 2024-12-13 PROCEDURE — 77067 SCR MAMMO BI INCL CAD: CPT

## 2024-12-16 ENCOUNTER — ANCILLARY PROCEDURE (OUTPATIENT)
Dept: MAMMOGRAPHY | Facility: CLINIC | Age: 89
End: 2024-12-16
Attending: FAMILY MEDICINE
Payer: COMMERCIAL

## 2024-12-16 DIAGNOSIS — R92.8 ABNORMAL MAMMOGRAM: ICD-10-CM

## 2024-12-16 PROCEDURE — 77065 DX MAMMO INCL CAD UNI: CPT | Mod: LT

## 2025-02-04 ENCOUNTER — OFFICE VISIT (OUTPATIENT)
Dept: CARDIOLOGY | Facility: CLINIC | Age: OVER 89
End: 2025-02-04
Attending: INTERNAL MEDICINE
Payer: COMMERCIAL

## 2025-02-04 VITALS
RESPIRATION RATE: 14 BRPM | SYSTOLIC BLOOD PRESSURE: 96 MMHG | BODY MASS INDEX: 23.86 KG/M2 | DIASTOLIC BLOOD PRESSURE: 65 MMHG | WEIGHT: 119 LBS | HEART RATE: 68 BPM

## 2025-02-04 DIAGNOSIS — R60.0 BILATERAL LOWER EXTREMITY EDEMA: ICD-10-CM

## 2025-02-04 DIAGNOSIS — I47.10 SVT (SUPRAVENTRICULAR TACHYCARDIA): Primary | ICD-10-CM

## 2025-02-04 DIAGNOSIS — I34.0 MITRAL VALVE INSUFFICIENCY, UNSPECIFIED ETIOLOGY: ICD-10-CM

## 2025-02-04 PROCEDURE — 99214 OFFICE O/P EST MOD 30 MIN: CPT | Performed by: INTERNAL MEDICINE

## 2025-02-04 NOTE — LETTER
2/4/2025    Flex Bain MD  [address]  [facility] [address]    RE: Maria D Moore       Dear Colleague,     I had the pleasure of seeing Maria D Moore in the [facility].         [facility]   [address], [place] [address]   [email or web address]   OFFICE: [phone]            Impression and Plan        1. Supraventricular tachycardia. Maria D underwent successful ablation of slow AV node pathway with cryoablation 26 June 2018.  This denies any subjective recurrence of SVT since my last visit with her.       2.  Mitral insufficiency.  This was felt trace-mild on last echocardiogram 27 July 2023     3.  Bilateral lower extremity edema.  Suspect dependent in nature since nearly resolved in a.m.'s.  This has been stable since last visit.     Tentatively plan on follow-up in 1 year.    35 minutes spent reviewing prior records (including documentation, laboratory studies, cardiac testing/imaging), interview with patient along with physical exam, planning, and subsequent documentation/crafting of note).           History of Present Illness    Once again I would like to thank you again for asking me to participate in the care of your patient, Maria D Moore.  As you know, but to reiterate for my own records, Maria D Moore is a 89 year old female with history of SVT.  Maria D underwent successful ablation of slow AV node pathway with cryoablation 26 June 2018.     In follow-up today, Maria D denies any recurrent subjective SVT since my last visit with her.  She denies any chest pain.  Her breathing has been comfortable.       She still has some lower extremity edema although this has increased somewhat as of late.  Has been somewhat uncomfortable for her.    Further review of systems is otherwise negative/noncontributory (medical record and 13 point review of systems reviewed as well and pertinent positives noted).         Cardiac  Diagnostics      Echocardiogram 27 July 2023:  Normal left ventricular size and systolic performance with ejection fraction of 60-65%.  Mild-moderate tricuspid insufficiency.  Normal right ventricular size and systolic performance.  Normal atrial dimensions.    Echocardiogram 28 April 2022:  Normal left ventricular size and systolic performance with ejection fraction of 60 to 65%.  Trace aortic insufficiency.  Mild-moderate mitral insufficiency.  Moderate tricuspid insufficiency.  Normal right ventricular size and systolic performance.  Normal atrial dimensions.    Echocardiogram 1 June 2018 (personally reviewed):  Normal left ventricular size and systolic performance with a visually estimated ejection fraction of 65%.   There is moderate mitral insufficiency.   There is moderate tricuspid insufficiency.   Normal right ventricular size and systolic performance.   There is moderate bi-atrial enlargement.  When compared to the prior real-time echocardiogram dated 14 August 2010, the degree of mitral insufficiency has increased from mild to now moderate.    Echocardiogram 14 August 2010:  Normal left ventricular size and systolic performance with ejection fraction of 55%.  Moderate tricuspid insufficiency.  Mild left atrial enlargement.    Nuclear perfusion imaging study 1 August 2018:  Small reversible defect in the anterior/apical segments.  Significant splanchnic artifact reducing sensitivity of finding.  Normal left ventricular systolic performance with ejection fraction of 81%.    Event recorder 6 April 2016 through 2 May 2016:  Largely normal 30-day event monitor.  Symptoms correlating with sinus rhythm and occasional atrial ectopy.    Holter monitor 2 March 2015:  Moderate amount of atrial ectopy is present but most of the PACs are singular or short runs at modest rates.   No atrial fibrillation identified.             Physical Examination       BP 96/65 (BP Location: Left arm, Patient Position: Sitting, Cuff  Size: Adult Regular)   Pulse 68   Resp 14   Wt 54 kg (119 lb)   BMI 23.86 kg/m          Wt Readings from Last 3 Encounters:   02/04/25 54 kg (119 lb)   09/17/24 54.4 kg (120 lb)   09/17/24 53.1 kg (117 lb)       The patient is alert and oriented times three. Sclerae are anicteric. Mucosal membranes are moist. Jugular venous pressure is normal. No significant adenopathy/thyromegally appreciated. Lungs are clear with good expansion. On cardiovascular exam, the patient has a regular S1 and S2. Abdomen is soft and non-tender. Extremities reveal no clubbing, cyanosis, or edema.         Family History/Social History/Risk Factors   Patient does not smoke.  Family history reviewed, and family history includes Cerebrovascular Disease in her father.          Medical History  Surgical History Family History Social History   Past Medical History:   Diagnosis Date     Back pain      Furuncle      Hyperlipidemia      Macular degeneration      Macular degeneration      Osteoarthritis      Osteoporosis      SVT (supraventricular tachycardia)      SVT (supraventricular tachycardia)      Past Surgical History:   Procedure Laterality Date     EP ABLATION SVT Right 6/26/2018    Procedure: EP Ablation Supra Ventricular;  Surgeon: Dany Renae MD;  Location: [facility];  Service:      ESOPHAGOSCOPY, GASTROSCOPY, DUODENOSCOPY (EGD), COMBINED N/A 4/4/2022    Procedure: ESOPHAGOGASTRODUODENOSCOPY with esophageal biopsy;  Surgeon: Ashlyn Acuna MD;  Location: [facility] GI     GASTROSTOMY, INSERT TUBE, COMBINED N/A 2/9/2021    Procedure: PLACEMENT OF GASTROSTOMY TUBE,;  Surgeon: Clinton Pavon MD;  Location: [facility] OR;  Service: General     HC REMOVAL ADENOIDS,PRIMARY,<11 Y/O      Description: Adenoidectomy;  Recorded: 12/31/2007;     HC REMOVAL OF TONSILS,<11 Y/O      Description: Tonsillectomy;  Recorded: 12/31/2007;     HYSTERECTOMY       LAPAROTOMY EXPLORATORY N/A 2/9/2021    Procedure: EXPLORATORY  LAPAROTOMY,  REPAIR GRAHM PATCH OF DUODENAL ULCER;  Surgeon: Clinton Pavon MD;  Location: [facility] OR;  Service: General     OOPHORECTOMY       ZC APPENDECTOMY      Description: Appendectomy;  Recorded: 12/31/2007;     [facility] TOTAL KNEE ARTHROPLASTY      Description: Total Knee Arthroplasty;  Recorded: 12/31/2007;     [facility] VAG HYST,RMV TUBE/OVARY,FIX ENTEROCE      Description: Vag Hysterect Uterus <250g Remov Both Ovaries Rep Enterocele;  Recorded: 12/31/2007;     Family History   Problem Relation Age of Onset     Cerebrovascular Disease Father         Social History     Socioeconomic History     Marital status:      Spouse name: Not on file     Number of children: Not on file     Years of education: Not on file     Highest education level: Not on file   Occupational History     Not on file   Tobacco Use     Smoking status: Never     Passive exposure: Never     Smokeless tobacco: Never   Vaping Use     Vaping status: Never Used   Substance and Sexual Activity     Alcohol use: No     Drug use: No     Sexual activity: Not on file   Other Topics Concern     Parent/sibling w/ CABG, MI or angioplasty before 65F 55M? 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  for troubleshooting at current residence     Social Drivers of Health     Financial Resource Strain: Low Risk  (3/28/2024)    Financial Resource Strain      Within the past 12 months, have you or your family members you live with been unable to get utilities (heat, electricity) when it was really needed?: No   Food Insecurity: Low Risk  (3/28/2024)    Food Insecurity      Within the past 12 months, did you worry that your food would run out before you got money to buy more?: No      Within the past 12 months, did the food you bought just not last and you didn t have money to get more?:  No   Transportation Needs: Low Risk  (3/28/2024)    Transportation Needs      Within the past 12 months, has lack of transportation kept you from medical appointments, getting your medicines, non-medical meetings or appointments, work, or from getting things that you need?: No   Physical Activity: Unknown (3/28/2024)    Exercise Vital Sign      Days of Exercise per Week: 4 days      Minutes of Exercise per Session: Not on file   Stress: No Stress Concern Present (3/28/2024)    [employer] - Occupational Stress Questionnaire      Feeling of Stress : Only a little   Social Connections: Unknown (3/28/2024)    Social Connection and Isolation Panel [NHANES]      Frequency of Communication with Friends and Family: Not on file      Frequency of Social Gatherings with Friends and Family: Three times a week      Attends Mandaeism Services: Not on file      Active Member of Clubs or Organizations: Not on file      Attends Club or Organization Meetings: Not on file      Marital Status: Not on file   Interpersonal Safety: Low Risk  (3/28/2024)    Interpersonal Safety      Do you feel physically and emotionally safe where you currently live?: Yes      Within the past 12 months, have you been hit, slapped, kicked or otherwise physically hurt by someone?: No      Within the past 12 months, have you been humiliated or emotionally abused in other ways by your partner or ex-partner?: No   Housing Stability: Low Risk  (3/28/2024)    Housing Stability      Do you have housing? : Yes      Are you worried about losing your housing?: No           Medications  Allergies   Current Outpatient Medications   Medication Sig Dispense Refill     calcium carbonate (OS-DOMONIQUE) 600 mg (1,500 mg) tablet [CALCIUM CARBONATE (OS-DOMONIQUE) 600 MG (1,500 MG) TABLET] Take 600 mg by mouth every other day.              furosemide (LASIX) 40 MG tablet Take 1 tablet (40 mg) by mouth daily. 90 tablet 3     levothyroxine  (SYNTHROID/LEVOTHROID) 25 MCG tablet [LEVOTHYROXINE (SYNTHROID, LEVOTHROID) 25 MCG TABLET] TAKE 1 TABLET DAILY AT 6:00 A.M. 90 tablet 3     metoprolol succinate ER (TOPROL XL) 50 MG 24 hr tablet Take 1 tablet (50 mg) by mouth at bedtime 90 tablet 3     OMEGA-3/DHA/EPA/FISH OIL (FISH OIL-OMEGA-3 FATTY ACIDS) 300-1,000 mg capsule [OMEGA-3/DHA/EPA/FISH OIL (FISH OIL-OMEGA-3 FATTY ACIDS) 300-1,000 MG CAPSULE] Take 2,000 mg by mouth daily.        pantoprazole (PROTONIX) 40 MG EC tablet Take 1 tablet (40 mg) by mouth 2 times daily 180 tablet 3     simvastatin (ZOCOR) 20 MG tablet [SIMVASTATIN (ZOCOR) 20 MG TABLET] TAKE 1 TABLET DAILY AT BEDTIME 90 tablet 3     VIT C/E/ZN/COPPR/LUTEIN/ZEAXAN (PRESERVISION AREDS 2 ORAL) [VIT C/E/ZN/COPPR/LUTEIN/ZEAXAN (PRESERVISION AREDS 2 ORAL)] Take 1 tablet by mouth 2 (two) times a day.         Allergies   Allergen Reactions     Cephalexin Shortness Of Breath     Erythromycin Base [Erythromycin] Rash     Penicillins Unknown     Sulfa (Sulfonamide Antibiotics) [Sulfa Antibiotics] Rash     Vancomycin Itching          Lab Results    Chemistry/lipid CBC Cardiac Enzymes/BNP/TSH/INR   Recent Labs   Lab Test 02/29/24  0946   CHOL 173   HDL 87   LDL 74   TRIG 62     Recent Labs   Lab Test 02/29/24  0946 03/13/23  1023 03/31/22  0802   LDL 74 90 89     Recent Labs   Lab Test 09/20/24  0951      POTASSIUM 3.8   CHLORIDE 101   CO2 27   *   BUN 16.3   CR 0.98*   GFRESTIMATED 55*   DOMONIQUE 9.2  9.2     Recent Labs   Lab Test 09/20/24  0951 03/28/24  0738 02/29/24  0946   CR 0.98* 0.97* 1.05*     Recent Labs   Lab Test 09/20/24  0951   A1C 5.4          Recent Labs   Lab Test 09/20/24  0951   WBC 6.4   HGB 13.6   HCT 41.9   MCV 94        Recent Labs   Lab Test 09/20/24  0951 03/28/24  0737 03/13/23  1023   HGB 13.6 12.8 13.3    Recent Labs   Lab Test 06/01/18  0706 05/31/18  2305 05/31/18  1727   TROPONINI 0.31* 0.41* 0.28     Recent Labs   Lab Test 04/08/21  1036 02/10/21  0438   BNP  204* 518*     Recent Labs   Lab Test 02/29/24  0946   TSH 2.17     Recent Labs   Lab Test 06/01/18  0011 05/31/18  1225   INR 1.16* 1.12*          Medications  Allergies   Current Outpatient Medications   Medication Sig Dispense Refill     calcium carbonate (OS-DOMONIQUE) 600 mg (1,500 mg) tablet [CALCIUM CARBONATE (OS-DOMONIQUE) 600 MG (1,500 MG) TABLET] Take 600 mg by mouth every other day.              furosemide (LASIX) 40 MG tablet Take 1 tablet (40 mg) by mouth daily. 90 tablet 3     levothyroxine (SYNTHROID/LEVOTHROID) 25 MCG tablet [LEVOTHYROXINE (SYNTHROID, LEVOTHROID) 25 MCG TABLET] TAKE 1 TABLET DAILY AT 6:00 A.M. 90 tablet 3     metoprolol succinate ER (TOPROL XL) 50 MG 24 hr tablet Take 1 tablet (50 mg) by mouth at bedtime 90 tablet 3     OMEGA-3/DHA/EPA/FISH OIL (FISH OIL-OMEGA-3 FATTY ACIDS) 300-1,000 mg capsule [OMEGA-3/DHA/EPA/FISH OIL (FISH OIL-OMEGA-3 FATTY ACIDS) 300-1,000 MG CAPSULE] Take 2,000 mg by mouth daily.        pantoprazole (PROTONIX) 40 MG EC tablet Take 1 tablet (40 mg) by mouth 2 times daily 180 tablet 3     simvastatin (ZOCOR) 20 MG tablet [SIMVASTATIN (ZOCOR) 20 MG TABLET] TAKE 1 TABLET DAILY AT BEDTIME 90 tablet 3     VIT C/E/ZN/COPPR/LUTEIN/ZEAXAN (PRESERVISION AREDS 2 ORAL) [VIT C/E/ZN/COPPR/LUTEIN/ZEAXAN (PRESERVISION AREDS 2 ORAL)] Take 1 tablet by mouth 2 (two) times a day.        Allergies   Allergen Reactions     Cephalexin Shortness Of Breath     Erythromycin Base [Erythromycin] Rash     Penicillins Unknown     Sulfa (Sulfonamide Antibiotics) [Sulfa Antibiotics] Rash     Vancomycin Itching          Lab Results   Lab Results   Component Value Date     09/20/2024    CO2 27 09/20/2024    CO2 24 03/31/2022    BUN 16.3 09/20/2024    BUN 14 03/31/2022     Lab Results   Component Value Date    WBC 6.4 09/20/2024    HGB 13.6 09/20/2024    HCT 41.9 09/20/2024    MCV 94 09/20/2024     09/20/2024     Lab Results   Component Value Date    CHOL 173 02/29/2024    TRIG 62 02/29/2024     HDL 87 02/29/2024     Lab Results   Component Value Date    INR 1.16 06/01/2018     Lab Results   Component Value Date     04/08/2021     Lab Results   Component Value Date    TROPONINI 0.31 06/01/2018    TROPONINI 0.41 05/31/2018    TROPONINI 0.28 05/31/2018     Lab Results   Component Value Date    TSH 2.17 02/29/2024    TSH 2.62 03/31/2022                      Thank you for allowing me to participate in the care of your patient.      Sincerely,     Radha Contreras MD     [facility]  cc:   Radha Contreras MD  [address]  [place] [address]

## 2025-02-04 NOTE — PROGRESS NOTES
[facility] HEART CARE [address], [place] [address]   [email or web address]   OFFICE: [phone]            Impression and Plan        1. Supraventricular tachycardia. Maria D underwent successful ablation of slow AV node pathway with cryoablation 26 June 2018.  This denies any subjective recurrence of SVT since my last visit with her.       2.  Mitral insufficiency.  This was felt trace-mild on last echocardiogram 27 July 2023     3.  Bilateral lower extremity edema.  Suspect dependent in nature since nearly resolved in a.m.'s.  This has been stable since last visit.     Tentatively plan on follow-up in 1 year.    35 minutes spent reviewing prior records (including documentation, laboratory studies, cardiac testing/imaging), interview with patient along with physical exam, planning, and subsequent documentation/crafting of note).           History of Present Illness    Once again I would like to thank you again for asking me to participate in the care of your patient, Maria D Moore.  As you know, but to reiterate for my own records, Maria D Moore is a 89 year old female with history of SVT.  Maria D underwent successful ablation of slow AV node pathway with cryoablation 26 June 2018.     In follow-up today, Maria D denies any recurrent subjective SVT since my last visit with her.  She denies any chest pain.  Her breathing has been comfortable.       She still has some lower extremity edema although this has increased somewhat as of late.  Has been somewhat uncomfortable for her.    Further review of systems is otherwise negative/noncontributory (medical record and 13 point review of systems reviewed as well and pertinent positives noted).         Cardiac Diagnostics      Echocardiogram 27 July 2023:  Normal left ventricular size and systolic performance with ejection fraction of 60-65%.  Mild-moderate tricuspid insufficiency.  Normal right ventricular size and  systolic performance.  Normal atrial dimensions.    Echocardiogram 28 April 2022:  Normal left ventricular size and systolic performance with ejection fraction of 60 to 65%.  Trace aortic insufficiency.  Mild-moderate mitral insufficiency.  Moderate tricuspid insufficiency.  Normal right ventricular size and systolic performance.  Normal atrial dimensions.    Echocardiogram 1 June 2018 (personally reviewed):  Normal left ventricular size and systolic performance with a visually estimated ejection fraction of 65%.   There is moderate mitral insufficiency.   There is moderate tricuspid insufficiency.   Normal right ventricular size and systolic performance.   There is moderate bi-atrial enlargement.  When compared to the prior real-time echocardiogram dated 14 August 2010, the degree of mitral insufficiency has increased from mild to now moderate.    Echocardiogram 14 August 2010:  Normal left ventricular size and systolic performance with ejection fraction of 55%.  Moderate tricuspid insufficiency.  Mild left atrial enlargement.    Nuclear perfusion imaging study 1 August 2018:  Small reversible defect in the anterior/apical segments.  Significant splanchnic artifact reducing sensitivity of finding.  Normal left ventricular systolic performance with ejection fraction of 81%.    Event recorder 6 April 2016 through 2 May 2016:  Largely normal 30-day event monitor.  Symptoms correlating with sinus rhythm and occasional atrial ectopy.    Holter monitor 2 March 2015:  Moderate amount of atrial ectopy is present but most of the PACs are singular or short runs at modest rates.   No atrial fibrillation identified.             Physical Examination       BP 96/65 (BP Location: Left arm, Patient Position: Sitting, Cuff Size: Adult Regular)   Pulse 68   Resp 14   Wt 54 kg (119 lb)   BMI 23.86 kg/m          Wt Readings from Last 3 Encounters:   02/04/25 54 kg (119 lb)   09/17/24 54.4 kg (120 lb)   09/17/24 53.1 kg (117 lb)        The patient is alert and oriented times three. Sclerae are anicteric. Mucosal membranes are moist. Jugular venous pressure is normal. No significant adenopathy/thyromegally appreciated. Lungs are clear with good expansion. On cardiovascular exam, the patient has a regular S1 and S2. Abdomen is soft and non-tender. Extremities reveal no clubbing, cyanosis, or edema.         Family History/Social History/Risk Factors   Patient does not smoke.  Family history reviewed, and family history includes Cerebrovascular Disease in her father.          Medical History  Surgical History Family History Social History   Past Medical History:   Diagnosis Date    Back pain     Furuncle     Hyperlipidemia     Macular degeneration     Macular degeneration     Osteoarthritis     Osteoporosis     SVT (supraventricular tachycardia)     SVT (supraventricular tachycardia)      Past Surgical History:   Procedure Laterality Date    EP ABLATION SVT Right 6/26/2018    Procedure: EP Ablation Supra Ventricular;  Surgeon: Dany Renae MD;  Location: [facility];  Service:     ESOPHAGOSCOPY, GASTROSCOPY, DUODENOSCOPY (EGD), COMBINED N/A 4/4/2022    Procedure: ESOPHAGOGASTRODUODENOSCOPY with esophageal biopsy;  Surgeon: Ashlyn Acuna MD;  Location: [facility] GI    GASTROSTOMY, INSERT TUBE, COMBINED N/A 2/9/2021    Procedure: PLACEMENT OF GASTROSTOMY TUBE,;  Surgeon: Clinton Pavon MD;  Location: [facility];  Service: General    HC REMOVAL ADENOIDS,PRIMARY,<11 Y/O      Description: Adenoidectomy;  Recorded: 12/31/2007;    HC REMOVAL OF TONSILS,<11 Y/O      Description: Tonsillectomy;  Recorded: 12/31/2007;    HYSTERECTOMY      LAPAROTOMY EXPLORATORY N/A 2/9/2021    Procedure: EXPLORATORY LAPAROTOMY,  REPAIR GRAHM PATCH OF DUODENAL ULCER;  Surgeon: Clinton Pavon MD;  Location: [facility];  Service: General    OOPHORECTOMY      [facility] APPENDECTOMY      Description: Appendectomy;  Recorded: 12/31/2007;    [facility]  TOTAL KNEE ARTHROPLASTY      Description: Total Knee Arthroplasty;  Recorded: 12/31/2007;    ZZC VAG HYST,RMV TUBE/OVARY,FIX ENTEROCE      Description: Vag Hysterect Uterus <250g Remov Both Ovaries Rep Enterocele;  Recorded: 12/31/2007;     Family History   Problem Relation Age of Onset    Cerebrovascular Disease Father         Social History     Socioeconomic History    Marital status:      Spouse name: Not on file    Number of children: Not on file    Years of education: Not on file    Highest education level: Not on file   Occupational History    Not on file   Tobacco Use    Smoking status: Never     Passive exposure: Never    Smokeless tobacco: Never   Vaping Use    Vaping status: Never Used   Substance and Sexual Activity    Alcohol use: No    Drug use: No    Sexual activity: Not on file   Other Topics Concern    Parent/sibling w/ CABG, MI or angioplasty before 65F 55M? 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[employer], teaching medical dispensing and kind of a  for troubleshooting at current residence     Social Drivers of Health     Financial Resource Strain: Low Risk  (3/28/2024)    Financial Resource Strain     Within the past 12 months, have you or your family members you live with been unable to get utilities (heat, electricity) when it was really needed?: No   Food Insecurity: Low Risk  (3/28/2024)    Food Insecurity     Within the past 12 months, did you worry that your food would run out before you got money to buy more?: No     Within the past 12 months, did the food you bought just not last and you didn t have money to get more?: No   Transportation Needs: Low Risk  (3/28/2024)    Transportation Needs     Within the past 12 months, has lack of transportation kept you from medical appointments, getting your medicines, non-medical meetings or appointments, work, or from getting  things that you need?: No   Physical Activity: Unknown (3/28/2024)    Exercise Vital Sign     Days of Exercise per Week: 4 days     Minutes of Exercise per Session: Not on file   Stress: No Stress Concern Present (3/28/2024)    [employer] - Occupational Stress Questionnaire     Feeling of Stress : Only a little   Social Connections: Unknown (3/28/2024)    Social Connection and Isolation Panel [NHANES]     Frequency of Communication with Friends and Family: Not on file     Frequency of Social Gatherings with Friends and Family: Three times a week     Attends Religion Services: Not on file     Active Member of Clubs or Organizations: Not on file     Attends Club or Organization Meetings: Not on file     Marital Status: Not on file   Interpersonal Safety: Low Risk  (3/28/2024)    Interpersonal Safety     Do you feel physically and emotionally safe where you currently live?: Yes     Within the past 12 months, have you been hit, slapped, kicked or otherwise physically hurt by someone?: No     Within the past 12 months, have you been humiliated or emotionally abused in other ways by your partner or ex-partner?: No   Housing Stability: Low Risk  (3/28/2024)    Housing Stability     Do you have housing? : Yes     Are you worried about losing your housing?: No           Medications  Allergies   Current Outpatient Medications   Medication Sig Dispense Refill    calcium carbonate (OS-DOMONIQUE) 600 mg (1,500 mg) tablet [CALCIUM CARBONATE (OS-DOMONIQUE) 600 MG (1,500 MG) TABLET] Take 600 mg by mouth every other day.             furosemide (LASIX) 40 MG tablet Take 1 tablet (40 mg) by mouth daily. 90 tablet 3    levothyroxine (SYNTHROID/LEVOTHROID) 25 MCG tablet [LEVOTHYROXINE (SYNTHROID, LEVOTHROID) 25 MCG TABLET] TAKE 1 TABLET DAILY AT 6:00 A.M. 90 tablet 3    metoprolol succinate ER (TOPROL XL) 50 MG 24 hr tablet Take 1 tablet (50 mg) by mouth at bedtime 90 tablet 3    OMEGA-3/DHA/EPA/FISH OIL (FISH  OIL-OMEGA-3 FATTY ACIDS) 300-1,000 mg capsule [OMEGA-3/DHA/EPA/FISH OIL (FISH OIL-OMEGA-3 FATTY ACIDS) 300-1,000 MG CAPSULE] Take 2,000 mg by mouth daily.       pantoprazole (PROTONIX) 40 MG EC tablet Take 1 tablet (40 mg) by mouth 2 times daily 180 tablet 3    simvastatin (ZOCOR) 20 MG tablet [SIMVASTATIN (ZOCOR) 20 MG TABLET] TAKE 1 TABLET DAILY AT BEDTIME 90 tablet 3    VIT C/E/ZN/COPPR/LUTEIN/ZEAXAN (PRESERVISION AREDS 2 ORAL) [VIT C/E/ZN/COPPR/LUTEIN/ZEAXAN (PRESERVISION AREDS 2 ORAL)] Take 1 tablet by mouth 2 (two) times a day.         Allergies   Allergen Reactions    Cephalexin Shortness Of Breath    Erythromycin Base [Erythromycin] Rash    Penicillins Unknown    Sulfa (Sulfonamide Antibiotics) [Sulfa Antibiotics] Rash    Vancomycin Itching          Lab Results    Chemistry/lipid CBC Cardiac Enzymes/BNP/TSH/INR   Recent Labs   Lab Test 02/29/24  0946   CHOL 173   HDL 87   LDL 74   TRIG 62     Recent Labs   Lab Test 02/29/24  0946 03/13/23  1023 03/31/22  0802   LDL 74 90 89     Recent Labs   Lab Test 09/20/24  0951      POTASSIUM 3.8   CHLORIDE 101   CO2 27   *   BUN 16.3   CR 0.98*   GFRESTIMATED 55*   DOMONIQUE 9.2  9.2     Recent Labs   Lab Test 09/20/24  0951 03/28/24  0738 02/29/24  0946   CR 0.98* 0.97* 1.05*     Recent Labs   Lab Test 09/20/24  0951   A1C 5.4          Recent Labs   Lab Test 09/20/24  0951   WBC 6.4   HGB 13.6   HCT 41.9   MCV 94        Recent Labs   Lab Test 09/20/24  0951 03/28/24  0737 03/13/23  1023   HGB 13.6 12.8 13.3    Recent Labs   Lab Test 06/01/18  0706 05/31/18  2305 05/31/18  1727   TROPONINI 0.31* 0.41* 0.28     Recent Labs   Lab Test 04/08/21  1036 02/10/21  0438   * 518*     Recent Labs   Lab Test 02/29/24  0946   TSH 2.17     Recent Labs   Lab Test 06/01/18  0011 05/31/18  1225   INR 1.16* 1.12*          Medications  Allergies   Current Outpatient Medications   Medication Sig Dispense Refill    calcium carbonate (OS-DOMONIQUE) 600 mg (1,500 mg) tablet  [CALCIUM CARBONATE (OS-DOMONIQUE) 600 MG (1,500 MG) TABLET] Take 600 mg by mouth every other day.             furosemide (LASIX) 40 MG tablet Take 1 tablet (40 mg) by mouth daily. 90 tablet 3    levothyroxine (SYNTHROID/LEVOTHROID) 25 MCG tablet [LEVOTHYROXINE (SYNTHROID, LEVOTHROID) 25 MCG TABLET] TAKE 1 TABLET DAILY AT 6:00 A.M. 90 tablet 3    metoprolol succinate ER (TOPROL XL) 50 MG 24 hr tablet Take 1 tablet (50 mg) by mouth at bedtime 90 tablet 3    OMEGA-3/DHA/EPA/FISH OIL (FISH OIL-OMEGA-3 FATTY ACIDS) 300-1,000 mg capsule [OMEGA-3/DHA/EPA/FISH OIL (FISH OIL-OMEGA-3 FATTY ACIDS) 300-1,000 MG CAPSULE] Take 2,000 mg by mouth daily.       pantoprazole (PROTONIX) 40 MG EC tablet Take 1 tablet (40 mg) by mouth 2 times daily 180 tablet 3    simvastatin (ZOCOR) 20 MG tablet [SIMVASTATIN (ZOCOR) 20 MG TABLET] TAKE 1 TABLET DAILY AT BEDTIME 90 tablet 3    VIT C/E/ZN/COPPR/LUTEIN/ZEAXAN (PRESERVISION AREDS 2 ORAL) [VIT C/E/ZN/COPPR/LUTEIN/ZEAXAN (PRESERVISION AREDS 2 ORAL)] Take 1 tablet by mouth 2 (two) times a day.        Allergies   Allergen Reactions    Cephalexin Shortness Of Breath    Erythromycin Base [Erythromycin] Rash    Penicillins Unknown    Sulfa (Sulfonamide Antibiotics) [Sulfa Antibiotics] Rash    Vancomycin Itching          Lab Results   Lab Results   Component Value Date     09/20/2024    CO2 27 09/20/2024    CO2 24 03/31/2022    BUN 16.3 09/20/2024    BUN 14 03/31/2022     Lab Results   Component Value Date    WBC 6.4 09/20/2024    HGB 13.6 09/20/2024    HCT 41.9 09/20/2024    MCV 94 09/20/2024     09/20/2024     Lab Results   Component Value Date    CHOL 173 02/29/2024    TRIG 62 02/29/2024    HDL 87 02/29/2024     Lab Results   Component Value Date    INR 1.16 06/01/2018     Lab Results   Component Value Date     04/08/2021     Lab Results   Component Value Date    TROPONINI 0.31 06/01/2018    TROPONINI 0.41 05/31/2018    TROPONINI 0.28 05/31/2018     Lab Results   Component Value  Date    TSH 2.17 02/29/2024    TSH 2.62 03/31/2022

## 2025-02-11 ENCOUNTER — OFFICE VISIT (OUTPATIENT)
Dept: ENDOCRINOLOGY | Facility: CLINIC | Age: OVER 89
End: 2025-02-11
Payer: COMMERCIAL

## 2025-02-11 VITALS
OXYGEN SATURATION: 96 % | DIASTOLIC BLOOD PRESSURE: 55 MMHG | WEIGHT: 126.3 LBS | HEART RATE: 73 BPM | SYSTOLIC BLOOD PRESSURE: 93 MMHG | HEIGHT: 61 IN | BODY MASS INDEX: 23.85 KG/M2

## 2025-02-11 DIAGNOSIS — M81.0 SENILE OSTEOPOROSIS: Primary | ICD-10-CM

## 2025-02-11 PROCEDURE — 99214 OFFICE O/P EST MOD 30 MIN: CPT | Performed by: NURSE PRACTITIONER

## 2025-02-11 NOTE — Clinical Note
2/11/2025      Maria D Moore  [address]  [place] [address]      Dear Colleague,    Thank you for referring your patient, Maria D Moore, to the [facility]. Please see a copy of my visit note below.    [facility]  ENDOCRINOLOGY    Osteoporosis Follow Up 2/11/2025    Maria D Moore, 11/24/1934, [id number]          Reason for visit      No diagnosis found.    History     Maria D Moore is a very pleasant 89 year old old female who presents for follow up.   SUMMARY:  As you know, she took a fall when she became syncopal with atrial fibrillation on August 13th, 2010. Initially plain radiographs were negative but with persistent pain. A subsequent CT scan on September 26th, 2010, did show the sacral insufficiency fractures both sacral wings extending into the S2 sacral body and nondisplaced fracture of the right pubic bone. MRI in September 2010 confirmed these findings. A followup CT done on March 9 2011, indicates again a broad based bilateral sacral insufficiency fractures and ununited fracture of the right pubic bone. She is here for further recommendations. I should note that at the time she was hospitalized with her fractures, she was told that her vitamin D level was low and she was treated with 68620 International Units of vitamin D weekly for three months. Subsequently, her level chuck just above 50 ng/mL has had serious  pelvic fractures while on bisphosphonates for at least six years. A 78-year-old woman with severe osteoporosis who has completed 2 years of teriparatide.  We discussed options for following with antiresorptive agent and I do think that denosumab would be preferable for her and she was in agreement with this plan.     Maria D is here today to discuss the findings on her most recent Dexa Scan. She had a significant loss of BMD marked in her Spine, of >7%. With further inspection and assistance from Ryan Martínez, Lead Dexa Tech, Dr Nomi Miranda,  Endocrinology, and Dr Minerva Painter, Endocrinology, it was found that L 3 and L 4 were included in the scoring and should not have been. Findings in bilateral hips and Forearm were stable. The question was whether pt was failing the Prolia injections that she has been getting.        TODAY:    Maria D returns today in follow-up for Osteoporosis. She continues on Prolia injections without difficulties. She has had no falls in the last year. She has been walking her hallways to get in her walking without being out in the cold.  She is still working 2 days a week with the [employer].       Risk Factors     The following high- risk conditions have been ruled out: celiac disease, eating disorders, gastric bypass, hyperparathyroidism, inflammatory bowel disease, hyperthyroidism, rheumatoid arthritis, lupus, chronic kidney disease.     Maria D Moore has the following risk factors: Age, Female gender,  and Low BMI     She is not on high risk medications such as glucocorticoids, anti-coagulants, anti-convulsants, chemotherapy or levothyroxine.    Patient deniesHysterectomy, Oophrectomy, Breast cancer and Family history of breast cancer.      Past Medical History     Patient Active Problem List   Diagnosis    Fatigue    Temperature Intolerance To Cold   (Consistent)    Abnormal Blood Chemistry    Osteoarthritis Of The Ankle/Foot (Multiple Joints)    Mixed hyperlipidemia    Generalized Osteoarthritis Of Multiple Sites    Edema    Osteoporosis Senile    Vaginal Discharge (Symptom)    Back Pain    Automatic Atrial Tachycardia    Supraventricular tachycardia    Hypotension, unspecified hypotension type    Actinic keratosis    Hair thinning    Free intraperitoneal air    Duodenal ulcer with perforation (H)    Hiatal hernia    S/P exploratory laparotomy    Perforated duodenal ulcer (H)    Hypothyroidism    Furuncle    Lightheadedness    Personal history of other drug therapy       Family History       family history  "includes Cerebrovascular Disease in her father.    Social History      reports that she has never smoked. She has never been exposed to tobacco smoke. She has never used smokeless tobacco. She reports that she does not drink alcohol and does not use drugs.      Review of Systems     Patient denies current pain, limited mobility, fractures. ***  Remainder per HPI.      Vital Signs     BP 93/55 (BP Location: Right arm, Patient Position: Sitting, Cuff Size: Adult Regular)   Pulse 73   Ht 1.54 m (5' 0.63\")   Wt 57.3 kg (126 lb 4.8 oz)   SpO2 96%   BMI 24.16 kg/m      Physical Exam     [unfilled]    Assessment     No diagnosis found.    Plan           Total visit minutes:***  Time spent counseling and coordination of care:***    Deisy Mckeon NP   Endocrinology  2/11/2025  9:43 AM    Current Medications     Outpatient Medications Prior to Visit   Medication Sig Dispense Refill    calcium carbonate (OS-DOMONIQUE) 600 mg (1,500 mg) tablet [CALCIUM CARBONATE (OS-DOMONIQUE) 600 MG (1,500 MG) TABLET] Take 600 mg by mouth every other day.             furosemide (LASIX) 40 MG tablet Take 1 tablet (40 mg) by mouth daily. 90 tablet 3    levothyroxine (SYNTHROID/LEVOTHROID) 25 MCG tablet [LEVOTHYROXINE (SYNTHROID, LEVOTHROID) 25 MCG TABLET] TAKE 1 TABLET DAILY AT 6:00 A.M. 90 tablet 3    metoprolol succinate ER (TOPROL XL) 50 MG 24 hr tablet Take 1 tablet (50 mg) by mouth at bedtime 90 tablet 3    OMEGA-3/DHA/EPA/FISH OIL (FISH OIL-OMEGA-3 FATTY ACIDS) 300-1,000 mg capsule [OMEGA-3/DHA/EPA/FISH OIL (FISH OIL-OMEGA-3 FATTY ACIDS) 300-1,000 MG CAPSULE] Take 2,000 mg by mouth daily.       pantoprazole (PROTONIX) 40 MG EC tablet Take 1 tablet (40 mg) by mouth 2 times daily 180 tablet 3    simvastatin (ZOCOR) 20 MG tablet [SIMVASTATIN (ZOCOR) 20 MG TABLET] TAKE 1 TABLET DAILY AT BEDTIME 90 tablet 3    VIT C/E/ZN/COPPR/LUTEIN/ZEAXAN (PRESERVISION AREDS 2 ORAL) [VIT C/E/ZN/COPPR/LUTEIN/ZEAXAN (PRESERVISION AREDS 2 ORAL)] Take 1 tablet by mouth " 2 (two) times a day.       Facility-Administered Medications Prior to Visit   Medication Dose Route Frequency Provider Last Rate Last Admin    [START ON 2/24/2025] denosumab (PROLIA) injection 60 mg  60 mg Subcutaneous Q6 Months Deisy Mckeon NP        denosumab (PROLIA) injection 60 mg  60 mg Subcutaneous Q6 Months Deisy Mckeon NP   60 mg at 09/24/24 0854         Lab Results     TSH   Date Value Ref Range Status   02/29/2024 2.17 0.30 - 4.20 uIU/mL Final   03/31/2022 2.62 0.30 - 5.00 uIU/mL Final           Imaging Results   Last DEXA scan:  No valid procedures specified.        Again, thank you for allowing me to participate in the care of your patient.        Sincerely,        Deisy Mckeon NP    Electronically signed

## 2025-02-11 NOTE — PROGRESS NOTES
[facility]  ENDOCRINOLOGY    Osteoporosis Follow Up 2/11/2025    Maria D Moore, 11/24/1934, [id number]          Reason for visit      1. Osteoporosis Senile        History     Maria D Moore is a very pleasant 89 year old old female who presents for follow up.   SUMMARY:  As you know, she took a fall when she became syncopal with atrial fibrillation on August 13th, 2010. Initially plain radiographs were negative but with persistent pain. A subsequent CT scan on September 26th, 2010, did show the sacral insufficiency fractures both sacral wings extending into the S2 sacral body and nondisplaced fracture of the right pubic bone. MRI in September 2010 confirmed these findings. A followup CT done on March 9 2011, indicates again a broad based bilateral sacral insufficiency fractures and ununited fracture of the right pubic bone. She is here for further recommendations. I should note that at the time she was hospitalized with her fractures, she was told that her vitamin D level was low and she was treated with 52951 International Units of vitamin D weekly for three months. Subsequently, her level chuck just above 50 ng/mL has had serious  pelvic fractures while on bisphosphonates for at least six years. A 78-year-old woman with severe osteoporosis who has completed 2 years of teriparatide.  We discussed options for following with antiresorptive agent and I do think that denosumab would be preferable for her and she was in agreement with this plan.     Maria D is here today to discuss the findings on her most recent Dexa Scan. She had a significant loss of BMD marked in her Spine, of >7%. With further inspection and assistance from Ryan Martínez, Lead Dexa Tech, Dr Nomi Miranda, Endocrinology, and Dr Minerva Painter, Endocrinology, it was found that L 3 and L 4 were included in the scoring and should not have been. Findings in bilateral hips and Forearm were stable. The question was whether pt was failing the  Prolia injections that she has been getting.        TODAY:    Maria D returns today in follow-up for Osteoporosis. She continues on Prolia injections without difficulties. She has had no falls in the last year. She has been walking her hallways to get in her walking without being out in the cold.  She is still working 2 days a week with the [employer]. Most recent Dexa scan, included in this note, showed stability. Current Vit d level is 53 and Calcium level is 9.2.      Risk Factors     The following high- risk conditions have been ruled out: celiac disease, eating disorders, gastric bypass, hyperparathyroidism, inflammatory bowel disease, hyperthyroidism, rheumatoid arthritis, lupus, chronic kidney disease.     Maria D Moore has the following risk factors: Age, Female gender,  and Low BMI     She is not on high risk medications such as glucocorticoids, anti-coagulants, anti-convulsants, chemotherapy or levothyroxine.    Patient deniesHysterectomy, Oophrectomy, Breast cancer and Family history of breast cancer.      Past Medical History     Patient Active Problem List   Diagnosis    Fatigue    Temperature Intolerance To Cold   (Consistent)    Abnormal Blood Chemistry    Osteoarthritis Of The Ankle/Foot (Multiple Joints)    Mixed hyperlipidemia    Generalized Osteoarthritis Of Multiple Sites    Edema    Osteoporosis Senile    Vaginal Discharge (Symptom)    Back Pain    Automatic Atrial Tachycardia    Supraventricular tachycardia    Hypotension, unspecified hypotension type    Actinic keratosis    Hair thinning    Free intraperitoneal air    Duodenal ulcer with perforation (H)    Hiatal hernia    S/P exploratory laparotomy    Perforated duodenal ulcer (H)    Hypothyroidism    Furuncle    Lightheadedness    Personal history of other drug therapy       Family History       family history includes Cerebrovascular Disease in her father.    Social History      reports that she has never smoked. She has never  "been exposed to tobacco smoke. She has never used smokeless tobacco. She reports that she does not drink alcohol and does not use drugs.      Review of Systems     Patient denies current pain, limited mobility, fractures.   Remainder per HPI.      Vital Signs     BP 93/55 (BP Location: Right arm, Patient Position: Sitting, Cuff Size: Adult Regular)   Pulse 73   Ht 1.54 m (5' 0.63\")   Wt 57.3 kg (126 lb 4.8 oz)   SpO2 96%   BMI 24.16 kg/m      Physical Exam     Constitutional:  Well developed, Well nourished  HENT:  Normocephalic,   Neck: normal in appearance  Eyes:  PERRL, Conjunctiva pink  Respiratory:  No respiratory distress  Skin: No acanthosis nigricans, lipoatrophy or lipodystrophy  Neurologic:  Alert & oriented x 3, nonfocal  Psychiatric:  Affect, Mood, Insight appropriate      Assessment     1. Osteoporosis Senile        Plan     She will remain on the Prolia injections and will follow-up with me in 1 year.         Deisy Mckeon NP  [facility]  2/11/2025  9:43 AM    Current Medications     Outpatient Medications Prior to Visit   Medication Sig Dispense Refill    calcium carbonate (OS-DOMONIQUE) 600 mg (1,500 mg) tablet [CALCIUM CARBONATE (OS-DOMONIQUE) 600 MG (1,500 MG) TABLET] Take 600 mg by mouth every other day.             furosemide (LASIX) 40 MG tablet Take 1 tablet (40 mg) by mouth daily. 90 tablet 3    levothyroxine (SYNTHROID/LEVOTHROID) 25 MCG tablet [LEVOTHYROXINE (SYNTHROID, LEVOTHROID) 25 MCG TABLET] TAKE 1 TABLET DAILY AT 6:00 A.M. 90 tablet 3    metoprolol succinate ER (TOPROL XL) 50 MG 24 hr tablet Take 1 tablet (50 mg) by mouth at bedtime 90 tablet 3    OMEGA-3/DHA/EPA/FISH OIL (FISH OIL-OMEGA-3 FATTY ACIDS) 300-1,000 mg capsule [OMEGA-3/DHA/EPA/FISH OIL (FISH OIL-OMEGA-3 FATTY ACIDS) 300-1,000 MG CAPSULE] Take 2,000 mg by mouth daily.       pantoprazole (PROTONIX) 40 MG EC tablet Take 1 tablet (40 mg) by mouth 2 times daily 180 tablet 3    simvastatin (ZOCOR) 20 MG tablet [SIMVASTATIN " (ZOCOR) 20 MG TABLET] TAKE 1 TABLET DAILY AT BEDTIME 90 tablet 3    VIT C/E/ZN/COPPR/LUTEIN/ZEAXAN (PRESERVISION AREDS 2 ORAL) [VIT C/E/ZN/COPPR/LUTEIN/ZEAXAN (PRESERVISION AREDS 2 ORAL)] Take 1 tablet by mouth 2 (two) times a day.       Facility-Administered Medications Prior to Visit   Medication Dose Route Frequency Provider Last Rate Last Admin    [START ON 2/24/2025] denosumab (PROLIA) injection 60 mg  60 mg Subcutaneous Q6 Months Deisy Mckeon NP        denosumab (PROLIA) injection 60 mg  60 mg Subcutaneous Q6 Months Deisy Mckeon NP   60 mg at 09/24/24 0854         Lab Results     TSH   Date Value Ref Range Status   02/29/2024 2.17 0.30 - 4.20 uIU/mL Final   03/31/2022 2.62 0.30 - 5.00 uIU/mL Final           Imaging Results   Last DEXA scan:  No valid procedures specified.        Study Result    Narrative & Impression   EXAM: DX TBS AXIAL and PERIPHERAL  LOCATION: [facility]  DATE: 10/31/2024     INDICATION: BMD screening, follow-up. On Prolia.  DEMOGRAPHICS: Age- 89 years. Gender- Female. Menopausal status- Postmenopausal.  COMPARISON: 10/24/2022  TECHNIQUE: Dual-energy x-ray absorptiometry (DXA) performed with routine technique. Forearm DXA performed since the hip and/or spine could not be measured or interpreted.  Trabecular bone score (TBS) analysis performed.     FINDINGS:     DXA RESULTS  -Lumbar Spine: L1-L2: BMD: 1.143 g/cm2. T-score: -0.2. Z-score: 1.8. Degenerative change may artifactually increase BMD.  -RIGHT Hip Total: BMD: 0.865 g/cm2. T-score: -1.1. Z-score: 1.4.  -RIGHT Hip Femoral neck: BMD: 0.846 g/cm2. T-score: -1.4. Z-score: 1.2.  -LEFT Hip Total: BMD: 0.832 g/cm2. T-score: -1.4. Z-score: 1.1.  -LEFT Hip Femoral neck: BMD: 0.915 g/cm2. T-score: -0.9. Z-score: 1.7.  -LEFT Radius 33%: BMD: 0.472 g/cm2. T-score: -3.3. Z-score: 0.3.     WHO T-SCORE CRITERIA  -Normal: T score at or above -1 SD  -Osteopenia: T score between -1 and -2.5 SD  -Osteoporosis: T score at  or below -2.5 SD     The World Health Organization (WHO) criteria is applicable to perimenopausal females, postmenopausal females, and men aged 50 years or older.     TBS RESULTS  -Lumbar Spine L1-L2: TBS: 1.230. TBS T-score: -2.7.TBS Z-score: --.     The TBS is a DXA derived measurement for fracture risk assessment, and reflects the structural condition of the bone microarchitecture. It can be used to adjust WHO Fracture Risk Assessment Tool (FRAX) probability of fracture in postmenopausal women and   older men. The calculated probabilities of fracture have been shown to be more accurate when computed with the TBS.     INTERVAL CHANGE  -There has been a 0.9% increase in lumbar spine BMD.  -There has been a 2.2% increase in bilateral hip BMD.  -There has been a 3.3% decrease in the left radius 33% BMD.     FRACTURE RISK  -The FRAX risk calculator is not applicable due to osteoporosis.     RECOMMENDATIONS  The patient's BMD is consistent with osteoporosis, and he/she is at increased fracture risk. If not currently being treated for low BMD, this would merit treatment according to the Bone Health and Osteoporosis Foundation.                                                                      IMPRESSION: OSTEOPOROSIS. T score meets the WHO criteria for osteoporosis at one or more measured sites. The risk of osteoporotic fracture increases approximately two-fold for each standard deviation decrease in T-score.

## 2025-02-24 ENCOUNTER — TELEPHONE (OUTPATIENT)
Dept: FAMILY MEDICINE | Facility: CLINIC | Age: OVER 89
End: 2025-02-24
Payer: COMMERCIAL

## 2025-02-24 DIAGNOSIS — E78.5 HYPERLIPIDEMIA, UNSPECIFIED HYPERLIPIDEMIA TYPE: ICD-10-CM

## 2025-02-24 DIAGNOSIS — N18.31 STAGE 3A CHRONIC KIDNEY DISEASE (H): ICD-10-CM

## 2025-02-24 DIAGNOSIS — E03.9 HYPOTHYROIDISM, UNSPECIFIED TYPE: Primary | ICD-10-CM

## 2025-02-24 NOTE — TELEPHONE ENCOUNTER
Order/Referral Request    Who is requesting: patient    Orders being requested: labs    Reason service is needed/diagnosis: she would like to do her labs prior to her physical. She would like to do them on 3-20 when she gets her other labs done    When are orders needed by: 3-20-25    Has this been discussed with Provider: No    Does patient have a preference on a Group/Provider/Facility? [facility]    Does patient have an appointment scheduled?: Yes: 3-20    Where to send orders: Place orders within Epic    Could we send this information to you in [facility] or would you prefer to receive a phone call?:   Patient would prefer a phone call   Okay to leave a detailed message?: Yes at Home number on file [phone] (home)

## 2025-02-25 NOTE — TELEPHONE ENCOUNTER
Called and informed patient, she had no further questions or concerns at this time.    Tessie Zee RN

## 2025-03-18 ENCOUNTER — LAB (OUTPATIENT)
Dept: LAB | Facility: CLINIC | Age: OVER 89
End: 2025-03-18
Payer: COMMERCIAL

## 2025-03-18 DIAGNOSIS — M81.0 SENILE OSTEOPOROSIS: ICD-10-CM

## 2025-03-18 DIAGNOSIS — E03.9 HYPOTHYROIDISM, UNSPECIFIED TYPE: ICD-10-CM

## 2025-03-18 DIAGNOSIS — E78.5 HYPERLIPIDEMIA, UNSPECIFIED HYPERLIPIDEMIA TYPE: ICD-10-CM

## 2025-03-18 DIAGNOSIS — Z92.29 PERSONAL HISTORY OF OTHER DRUG THERAPY: ICD-10-CM

## 2025-03-18 DIAGNOSIS — N18.31 STAGE 3A CHRONIC KIDNEY DISEASE (H): ICD-10-CM

## 2025-03-18 LAB
BASOPHILS # BLD AUTO: 0.1 10E3/UL (ref 0–0.2)
BASOPHILS NFR BLD AUTO: 1 %
EOSINOPHIL # BLD AUTO: 0.3 10E3/UL (ref 0–0.7)
EOSINOPHIL NFR BLD AUTO: 4 %
ERYTHROCYTE [DISTWIDTH] IN BLOOD BY AUTOMATED COUNT: 13 % (ref 10–15)
HCT VFR BLD AUTO: 40.7 % (ref 35–47)
HGB BLD-MCNC: 13.5 G/DL (ref 11.7–15.7)
IMM GRANULOCYTES # BLD: 0 10E3/UL
IMM GRANULOCYTES NFR BLD: 0 %
LYMPHOCYTES # BLD AUTO: 1.7 10E3/UL (ref 0.8–5.3)
LYMPHOCYTES NFR BLD AUTO: 22 %
MCH RBC QN AUTO: 30.7 PG (ref 26.5–33)
MCHC RBC AUTO-ENTMCNC: 33.2 G/DL (ref 31.5–36.5)
MCV RBC AUTO: 93 FL (ref 78–100)
MONOCYTES # BLD AUTO: 0.6 10E3/UL (ref 0–1.3)
MONOCYTES NFR BLD AUTO: 7 %
NEUTROPHILS # BLD AUTO: 4.9 10E3/UL (ref 1.6–8.3)
NEUTROPHILS NFR BLD AUTO: 66 %
PLATELET # BLD AUTO: 234 10E3/UL (ref 150–450)
RBC # BLD AUTO: 4.4 10E6/UL (ref 3.8–5.2)
WBC # BLD AUTO: 7.5 10E3/UL (ref 4–11)

## 2025-03-18 PROCEDURE — 85025 COMPLETE CBC W/AUTO DIFF WBC: CPT

## 2025-03-19 LAB
CHOLEST SERPL-MCNC: 202 MG/DL
FASTING STATUS PATIENT QL REPORTED: YES
HDLC SERPL-MCNC: 108 MG/DL
LDLC SERPL CALC-MCNC: 84 MG/DL
NONHDLC SERPL-MCNC: 94 MG/DL
TRIGL SERPL-MCNC: 51 MG/DL
TSH SERPL DL<=0.005 MIU/L-ACNC: 1.92 UIU/ML (ref 0.3–4.2)
VIT D+METAB SERPL-MCNC: 52 NG/ML (ref 20–50)

## 2025-03-20 LAB
ALBUMIN SERPL BCG-MCNC: 4.3 G/DL (ref 3.5–5.2)
ALP SERPL-CCNC: 65 U/L (ref 40–150)
ALT SERPL W P-5'-P-CCNC: 13 U/L (ref 0–50)
ANION GAP SERPL CALCULATED.3IONS-SCNC: 15 MMOL/L (ref 7–15)
AST SERPL W P-5'-P-CCNC: 23 U/L (ref 0–45)
BILIRUB SERPL-MCNC: 0.6 MG/DL
BUN SERPL-MCNC: 17 MG/DL (ref 8–23)
CALCIUM SERPL-MCNC: ABNORMAL MG/DL
CHLORIDE SERPL-SCNC: 101 MMOL/L (ref 98–107)
CREAT SERPL-MCNC: 0.98 MG/DL (ref 0.51–0.95)
EGFRCR SERPLBLD CKD-EPI 2021: 55 ML/MIN/1.73M2
FASTING STATUS PATIENT QL REPORTED: YES
GLUCOSE SERPL-MCNC: 92 MG/DL (ref 70–99)
HCO3 SERPL-SCNC: 26 MMOL/L (ref 22–29)
POTASSIUM SERPL-SCNC: 3.8 MMOL/L (ref 3.4–5.3)
PROT SERPL-MCNC: 6.9 G/DL (ref 6.4–8.3)
SODIUM SERPL-SCNC: 142 MMOL/L (ref 135–145)

## 2025-03-26 ENCOUNTER — ALLIED HEALTH/NURSE VISIT (OUTPATIENT)
Dept: ENDOCRINOLOGY | Facility: CLINIC | Age: OVER 89
End: 2025-03-26
Payer: COMMERCIAL

## 2025-03-26 DIAGNOSIS — M81.0 SENILE OSTEOPOROSIS: Primary | ICD-10-CM

## 2025-03-26 DIAGNOSIS — Z92.29 PERSONAL HISTORY OF OTHER DRUG THERAPY: ICD-10-CM

## 2025-03-26 PROCEDURE — 99207 PR NO CHARGE NURSE ONLY: CPT | Performed by: NURSE PRACTITIONER

## 2025-03-26 PROCEDURE — 99207 PR NO CHARGE NURSE ONLY: CPT

## 2025-03-26 PROCEDURE — 96372 THER/PROPH/DIAG INJ SC/IM: CPT | Performed by: NURSE PRACTITIONER

## 2025-04-04 PROBLEM — N18.31 STAGE 3A CHRONIC KIDNEY DISEASE (H): Status: ACTIVE | Noted: 2025-04-04

## 2025-04-04 PROBLEM — R60.0 BILATERAL LOWER EXTREMITY EDEMA: Status: ACTIVE | Noted: 2025-04-04

## 2025-06-20 NOTE — TELEPHONE ENCOUNTER
Called and spoke with patient. Discussed recommendations for tetanus and Covid boosters. Recommended patient to speak with insurance prior to receiving immunizations at clinic. Patient requested nurse appointment to be cancelled and prefers to get immunizations through pharmacy for ease. Appointment cancelled.    Tessie Zee RN     no

## 2025-07-22 ENCOUNTER — OFFICE VISIT (OUTPATIENT)
Dept: ENDOCRINOLOGY | Facility: CLINIC | Age: OVER 89
End: 2025-07-22
Payer: COMMERCIAL

## 2025-07-22 VITALS
SYSTOLIC BLOOD PRESSURE: 110 MMHG | HEART RATE: 61 BPM | OXYGEN SATURATION: 95 % | WEIGHT: 121.9 LBS | BODY MASS INDEX: 24.21 KG/M2 | DIASTOLIC BLOOD PRESSURE: 73 MMHG

## 2025-07-22 DIAGNOSIS — M81.0 SENILE OSTEOPOROSIS: Primary | ICD-10-CM

## 2025-07-22 PROCEDURE — G2211 COMPLEX E/M VISIT ADD ON: HCPCS | Performed by: NURSE PRACTITIONER

## 2025-07-22 PROCEDURE — 3078F DIAST BP <80 MM HG: CPT | Performed by: NURSE PRACTITIONER

## 2025-07-22 PROCEDURE — 99213 OFFICE O/P EST LOW 20 MIN: CPT | Performed by: NURSE PRACTITIONER

## 2025-07-22 PROCEDURE — 3074F SYST BP LT 130 MM HG: CPT | Performed by: NURSE PRACTITIONER

## 2025-07-22 RX ORDER — DOXYCYCLINE 50 MG/1
TABLET ORAL
COMMUNITY
Start: 2025-07-21

## 2025-07-22 NOTE — LETTER
7/22/2025      Maria D Moore  [address]  [place] [address]      Dear Colleague,    Thank you for referring your patient, Maria D Moore, to the [facility]. Please see a copy of my visit note below.    [facility]  ENDOCRINOLOGY    Osteoporosis Follow Up 7/22/2025    Maria D Moore, 11/24/1934, [id number]          Reason for visit      1. Osteoporosis Senile        History     Maria D Moore is a very pleasant 89 year old old female who presents for follow up.   SUMMARY:  As you know, she took a fall when she became syncopal with atrial fibrillation on August 13th, 2010. Initially plain radiographs were negative but with persistent pain. A subsequent CT scan on September 26th, 2010, did show the sacral insufficiency fractures both sacral wings extending into the S2 sacral body and nondisplaced fracture of the right pubic bone. MRI in September 2010 confirmed these findings. A followup CT done on March 9 2011, indicates again a broad based bilateral sacral insufficiency fractures and ununited fracture of the right pubic bone. She is here for further recommendations. I should note that at the time she was hospitalized with her fractures, she was told that her vitamin D level was low and she was treated with 21708 International Units of vitamin D weekly for three months. Subsequently, her level chuck just above 50 ng/mL has had serious  pelvic fractures while on bisphosphonates for at least six years. A 78-year-old woman with severe osteoporosis who has completed 2 years of teriparatide.  We discussed options for following with antiresorptive agent and I do think that denosumab would be preferable for her and she was in agreement with this plan.     Maria D is here today to discuss the findings on her most recent Dexa Scan. She had a significant loss of BMD marked in her Spine, of >7%. With further inspection and assistance from Ryan Martínez, Lead Dexa Tech, Dr Mosher  Ruth, Endocrinology, and Dr Minerva Painter, Endocrinology, it was found that L 3 and L 4 were included in the scoring and should not have been. Findings in bilateral hips and Forearm were stable. The question was whether pt was failing the Prolia injections that she has been getting.           TODAY:    Maria D returns today in follow-up for Osteoporosis. She has had some recent dental problems that involved some pain and she was concerned because of the Prolia injections that she has been getting. She was sent to the Periodontist, who did a deep cleaning and put her on Doxycyline. She reports that it was feeling a lot better, but kept this appointment to see what we thought about remaining on the injections.       Risk Factors        The following high- risk conditions have been ruled out: celiac disease, eating disorders, gastric bypass, hyperparathyroidism, inflammatory bowel disease, hyperthyroidism, rheumatoid arthritis, lupus, chronic kidney disease.     Maria D Moore has the following risk factors: Age, Female gender,  and Low BMI     She is not on high risk medications such as glucocorticoids, anti-coagulants, anti-convulsants, chemotherapy or levothyroxine.    Patient deniesHysterectomy, Oophrectomy, Breast cancer and Family history of breast cancer.         Past Medical History     Patient Active Problem List   Diagnosis     Fatigue     Temperature Intolerance To Cold   (Consistent)     Abnormal Blood Chemistry     Primary osteoarthritis, unspecified ankle and foot     Hyperlipidemia, unspecified hyperlipidemia type     Generalized Osteoarthritis Of Multiple Sites     Edema     Osteoporosis Senile     Vaginal Discharge (Symptom)     Back Pain     Automatic Atrial Tachycardia     Supraventricular tachycardia     Hypotension, unspecified hypotension type     Actinic keratosis     Hair thinning     Free intraperitoneal air     Duodenal ulcer with perforation (H)     Hiatal hernia     S/P  exploratory laparotomy     Perforated duodenal ulcer (H)     Hypothyroidism     Furuncle     Lightheadedness     Personal history of other drug therapy     Stage 3a chronic kidney disease (H)     Bilateral lower extremity edema       Family History       family history includes Cerebrovascular Disease in her father.    Social History      reports that she has never smoked. She has never been exposed to tobacco smoke. She has never used smokeless tobacco. She reports that she does not drink alcohol and does not use drugs.      Review of Systems     Patient denies current pain, limited mobility, fractures.   Remainder per HPI.      Vital Signs     /73 (BP Location: Right arm, Patient Position: Sitting, Cuff Size: Adult Regular)   Pulse 61   Wt 55.3 kg (121 lb 14.4 oz)   SpO2 95%   BMI 24.21 kg/m      Physical Exam     Constitutional:  Well developed, Well nourished  HENT:  Normocephalic,   Neck: normal in appearance  Eyes:  PERRL, Conjunctiva pink  Respiratory:  No respiratory distress  Skin: No acanthosis nigricans, lipoatrophy or lipodystrophy  Neurologic:  Alert & oriented x 3, nonfocal  Psychiatric:  Affect, Mood, Insight appropriate      Assessment     1. Osteoporosis Senile        Plan     Pt will be seeing her Dental specialists mid August. We will await the findings at that appointment, and will decide upon remaining on the Prolia or not at that time.         Deisy Mckeon NP  [facility]  7/22/2025  7:37 AM    Current Medications     Outpatient Medications Prior to Visit   Medication Sig Dispense Refill     calcium carbonate (OS-DOMONIQUE) 600 mg (1,500 mg) tablet [CALCIUM CARBONATE (OS-DOMONIQUE) 600 MG (1,500 MG) TABLET] Take 600 mg by mouth every other day.              doxycycline monohydrate (ADOXA) 50 MG tablet        furosemide (LASIX) 40 MG tablet Take 1 tablet (40 mg) by mouth daily. 90 tablet 3     levothyroxine (SYNTHROID/LEVOTHROID) 25 MCG tablet [LEVOTHYROXINE (SYNTHROID, LEVOTHROID) 25 MCG  TABLET] TAKE 1 TABLET DAILY AT 6:00 A.M. 90 tablet 3     metoprolol succinate ER (TOPROL XL) 50 MG 24 hr tablet Take 1 tablet (50 mg) by mouth at bedtime. 90 tablet 3     OMEGA-3/DHA/EPA/FISH OIL (FISH OIL-OMEGA-3 FATTY ACIDS) 300-1,000 mg capsule [OMEGA-3/DHA/EPA/FISH OIL (FISH OIL-OMEGA-3 FATTY ACIDS) 300-1,000 MG CAPSULE] Take 2,000 mg by mouth daily.        pantoprazole (PROTONIX) 40 MG EC tablet Take 1 tablet (40 mg) by mouth 2 times daily. 180 tablet 3     simvastatin (ZOCOR) 20 MG tablet [SIMVASTATIN (ZOCOR) 20 MG TABLET] TAKE 1 TABLET DAILY AT BEDTIME 90 tablet 3     VIT C/E/ZN/COPPR/LUTEIN/ZEAXAN (PRESERVISION AREDS 2 ORAL) [VIT C/E/ZN/COPPR/LUTEIN/ZEAXAN (PRESERVISION AREDS 2 ORAL)] Take 1 tablet by mouth 2 (two) times a day.       Facility-Administered Medications Prior to Visit   Medication Dose Route Frequency Provider Last Rate Last Admin     denosumab (PROLIA) injection 60 mg  60 mg Subcutaneous Q6 Months Deisy Mckeon NP   60 mg at 03/26/25 1337         Lab Results     TSH   Date Value Ref Range Status   03/18/2025 1.92 0.30 - 4.20 uIU/mL Final   03/31/2022 2.62 0.30 - 5.00 uIU/mL Final           Imaging Results   Last DEXA scan:  No valid procedures specified.      Again, thank you for allowing me to participate in the care of your patient.        Sincerely,        Deisy Mckeon NP    Electronically signed

## 2025-07-22 NOTE — PROGRESS NOTES
[facility]  ENDOCRINOLOGY    Osteoporosis Follow Up 7/22/2025    Maria D Moore, 11/24/1934, [id number]          Reason for visit      1. Osteoporosis Senile        History     Maria D Moore is a very pleasant 89 year old old female who presents for follow up.   SUMMARY:  As you know, she took a fall when she became syncopal with atrial fibrillation on August 13th, 2010. Initially plain radiographs were negative but with persistent pain. A subsequent CT scan on September 26th, 2010, did show the sacral insufficiency fractures both sacral wings extending into the S2 sacral body and nondisplaced fracture of the right pubic bone. MRI in September 2010 confirmed these findings. A followup CT done on March 9 2011, indicates again a broad based bilateral sacral insufficiency fractures and ununited fracture of the right pubic bone. She is here for further recommendations. I should note that at the time she was hospitalized with her fractures, she was told that her vitamin D level was low and she was treated with 52992 International Units of vitamin D weekly for three months. Subsequently, her level chuck just above 50 ng/mL has had serious  pelvic fractures while on bisphosphonates for at least six years. A 78-year-old woman with severe osteoporosis who has completed 2 years of teriparatide.  We discussed options for following with antiresorptive agent and I do think that denosumab would be preferable for her and she was in agreement with this plan.     Maria D is here today to discuss the findings on her most recent Dexa Scan. She had a significant loss of BMD marked in her Spine, of >7%. With further inspection and assistance from Ryan Martínez, Lead Dexa Tech, Dr Nomi Miranda, Endocrinology, and Dr Minerva Painter, Endocrinology, it was found that L 3 and L 4 were included in the scoring and should not have been. Findings in bilateral hips and Forearm were stable. The question was whether pt was failing the  Prolia injections that she has been getting.           TODAY:    Maria D returns today in follow-up for Osteoporosis. She has had some recent dental problems that involved some pain and she was concerned because of the Prolia injections that she has been getting. She was sent to the Periodontist, who did a deep cleaning and put her on Doxycyline. She reports that it was feeling a lot better, but kept this appointment to see what we thought about remaining on the injections.       Risk Factors        The following high- risk conditions have been ruled out: celiac disease, eating disorders, gastric bypass, hyperparathyroidism, inflammatory bowel disease, hyperthyroidism, rheumatoid arthritis, lupus, chronic kidney disease.     Maria D Moore has the following risk factors: Age, Female gender,  and Low BMI     She is not on high risk medications such as glucocorticoids, anti-coagulants, anti-convulsants, chemotherapy or levothyroxine.    Patient deniesHysterectomy, Oophrectomy, Breast cancer and Family history of breast cancer.         Past Medical History     Patient Active Problem List   Diagnosis    Fatigue    Temperature Intolerance To Cold   (Consistent)    Abnormal Blood Chemistry    Primary osteoarthritis, unspecified ankle and foot    Hyperlipidemia, unspecified hyperlipidemia type    Generalized Osteoarthritis Of Multiple Sites    Edema    Osteoporosis Senile    Vaginal Discharge (Symptom)    Back Pain    Automatic Atrial Tachycardia    Supraventricular tachycardia    Hypotension, unspecified hypotension type    Actinic keratosis    Hair thinning    Free intraperitoneal air    Duodenal ulcer with perforation (H)    Hiatal hernia    S/P exploratory laparotomy    Perforated duodenal ulcer (H)    Hypothyroidism    Furuncle    Lightheadedness    Personal history of other drug therapy    Stage 3a chronic kidney disease (H)    Bilateral lower extremity edema       Family History       family history  includes Cerebrovascular Disease in her father.    Social History      reports that she has never smoked. She has never been exposed to tobacco smoke. She has never used smokeless tobacco. She reports that she does not drink alcohol and does not use drugs.      Review of Systems     Patient denies current pain, limited mobility, fractures.   Remainder per HPI.      Vital Signs     /73 (BP Location: Right arm, Patient Position: Sitting, Cuff Size: Adult Regular)   Pulse 61   Wt 55.3 kg (121 lb 14.4 oz)   SpO2 95%   BMI 24.21 kg/m      Physical Exam     Constitutional:  Well developed, Well nourished  HENT:  Normocephalic,   Neck: normal in appearance  Eyes:  PERRL, Conjunctiva pink  Respiratory:  No respiratory distress  Skin: No acanthosis nigricans, lipoatrophy or lipodystrophy  Neurologic:  Alert & oriented x 3, nonfocal  Psychiatric:  Affect, Mood, Insight appropriate      Assessment     1. Osteoporosis Senile        Plan     Pt will be seeing her Dental specialists mid August. We will await the findings at that appointment, and will decide upon remaining on the Prolia or not at that time.         Deisy Mckeon NP  [facility]  7/22/2025  7:37 AM    Current Medications     Outpatient Medications Prior to Visit   Medication Sig Dispense Refill    calcium carbonate (OS-DOMONIQUE) 600 mg (1,500 mg) tablet [CALCIUM CARBONATE (OS-DOMONIQUE) 600 MG (1,500 MG) TABLET] Take 600 mg by mouth every other day.             doxycycline monohydrate (ADOXA) 50 MG tablet       furosemide (LASIX) 40 MG tablet Take 1 tablet (40 mg) by mouth daily. 90 tablet 3    levothyroxine (SYNTHROID/LEVOTHROID) 25 MCG tablet [LEVOTHYROXINE (SYNTHROID, LEVOTHROID) 25 MCG TABLET] TAKE 1 TABLET DAILY AT 6:00 A.M. 90 tablet 3    metoprolol succinate ER (TOPROL XL) 50 MG 24 hr tablet Take 1 tablet (50 mg) by mouth at bedtime. 90 tablet 3    OMEGA-3/DHA/EPA/FISH OIL (FISH OIL-OMEGA-3 FATTY ACIDS) 300-1,000 mg capsule [OMEGA-3/DHA/EPA/FISH OIL  (FISH OIL-OMEGA-3 FATTY ACIDS) 300-1,000 MG CAPSULE] Take 2,000 mg by mouth daily.       pantoprazole (PROTONIX) 40 MG EC tablet Take 1 tablet (40 mg) by mouth 2 times daily. 180 tablet 3    simvastatin (ZOCOR) 20 MG tablet [SIMVASTATIN (ZOCOR) 20 MG TABLET] TAKE 1 TABLET DAILY AT BEDTIME 90 tablet 3    VIT C/E/ZN/COPPR/LUTEIN/ZEAXAN (PRESERVISION AREDS 2 ORAL) [VIT C/E/ZN/COPPR/LUTEIN/ZEAXAN (PRESERVISION AREDS 2 ORAL)] Take 1 tablet by mouth 2 (two) times a day.       Facility-Administered Medications Prior to Visit   Medication Dose Route Frequency Provider Last Rate Last Admin    denosumab (PROLIA) injection 60 mg  60 mg Subcutaneous Q6 Months Deisy Mckeon NP   60 mg at 03/26/25 1337         Lab Results     TSH   Date Value Ref Range Status   03/18/2025 1.92 0.30 - 4.20 uIU/mL Final   03/31/2022 2.62 0.30 - 5.00 uIU/mL Final           Imaging Results   Last DEXA scan:  No valid procedures specified.

## 2025-08-25 ENCOUNTER — TELEPHONE (OUTPATIENT)
Dept: ENDOCRINOLOGY | Facility: CLINIC | Age: OVER 89
End: 2025-08-25
Payer: COMMERCIAL

## 2025-08-25 DIAGNOSIS — M81.0 SENILE OSTEOPOROSIS: Primary | ICD-10-CM

## 2025-08-25 RX ORDER — RISEDRONATE SODIUM 150 MG/1
TABLET, FILM COATED ORAL
Qty: 3 TABLET | Refills: 3 | Status: SHIPPED | OUTPATIENT
Start: 2025-08-25

## 2025-09-04 ENCOUNTER — TELEPHONE (OUTPATIENT)
Dept: ENDOCRINOLOGY | Facility: CLINIC | Age: OVER 89
End: 2025-09-04
Payer: COMMERCIAL

## 2025-09-04 ENCOUNTER — TELEPHONE (OUTPATIENT)
Dept: FAMILY MEDICINE | Facility: CLINIC | Age: OVER 89
End: 2025-09-04
Payer: COMMERCIAL

## (undated) DEVICE — KIT CONNECTOR FOR OLYMPUS ENDOSCOPES DEFENDO 100310

## (undated) DEVICE — KIT SCOPE CLEANING ENDOSCOPY BX00719705

## (undated) DEVICE — FORCEP BIOPSY DISP 000386

## (undated) DEVICE — PLATE GROUNDING ADULT W/CORD 9165L

## (undated) DEVICE — TUBING SUCTION MEDI-VAC 1/4"X20' N620A - HE

## (undated) DEVICE — TUBING ENDOGATOR + H2O PORT CO

## (undated) DEVICE — SWABCAP DISINFECTANT GREEN CFF10-250

## (undated) DEVICE — BITE BLOCK SCOPE DISP 20 X 27 000429

## (undated) DEVICE — CATH SUCTION 14FR W/O CTRL DYND41962

## (undated) DEVICE — SUCTION CANISTER 1000ML 65651-510

## (undated) DEVICE — SYR 03ML LL W/O NDL 309657

## (undated) DEVICE — CANNULA NASAL COMFORT SOFT 25FT 0537

## (undated) DEVICE — KIT IV START DYNDV1431

## (undated) DEVICE — CONNECTOR ONE-LINK INJECTION SITE LF 7N8399

## (undated) DEVICE — SOL WATER IRRIG 500ML BOTTLE 2F7113